# Patient Record
Sex: FEMALE | Race: BLACK OR AFRICAN AMERICAN | NOT HISPANIC OR LATINO | URBAN - METROPOLITAN AREA
[De-identification: names, ages, dates, MRNs, and addresses within clinical notes are randomized per-mention and may not be internally consistent; named-entity substitution may affect disease eponyms.]

---

## 2020-08-24 ENCOUNTER — OUT OF OFFICE VISIT (OUTPATIENT)
Dept: URBAN - METROPOLITAN AREA MEDICAL CENTER 12 | Facility: MEDICAL CENTER | Age: 25
End: 2020-08-24
Payer: COMMERCIAL

## 2020-08-24 DIAGNOSIS — R10.84 ABDOMINAL CRAMPING, GENERALIZED: ICD-10-CM

## 2020-08-24 DIAGNOSIS — K51.011 CHRONIC ULCERATIVE ENTEROCOLITIS WITH RECTAL BLEEDING: ICD-10-CM

## 2020-08-24 DIAGNOSIS — R11.0 CHRONIC NAUSEA: ICD-10-CM

## 2020-08-24 DIAGNOSIS — R19.7 ACUTE DIARRHEA: ICD-10-CM

## 2020-08-24 PROCEDURE — 99254 IP/OBS CNSLTJ NEW/EST MOD 60: CPT | Performed by: INTERNAL MEDICINE

## 2020-08-24 PROCEDURE — 99232 SBSQ HOSP IP/OBS MODERATE 35: CPT | Performed by: INTERNAL MEDICINE

## 2020-08-25 ENCOUNTER — OUT OF OFFICE VISIT (OUTPATIENT)
Dept: URBAN - METROPOLITAN AREA MEDICAL CENTER 12 | Facility: MEDICAL CENTER | Age: 25
End: 2020-08-25
Payer: COMMERCIAL

## 2020-08-25 DIAGNOSIS — R11.0 CHRONIC NAUSEA: ICD-10-CM

## 2020-08-25 DIAGNOSIS — K91.850 POUCHITIS: ICD-10-CM

## 2020-08-25 DIAGNOSIS — R19.7 ACUTE DIARRHEA: ICD-10-CM

## 2020-08-25 DIAGNOSIS — R10.13 ABDOMINAL DISCOMFORT, EPIGASTRIC: ICD-10-CM

## 2020-08-25 DIAGNOSIS — K62.5 ANAL BLEEDING: ICD-10-CM

## 2020-08-25 PROCEDURE — 43239 EGD BIOPSY SINGLE/MULTIPLE: CPT | Performed by: INTERNAL MEDICINE

## 2020-08-25 PROCEDURE — 44386 ENDOSCOPY BOWEL POUCH/BIOP: CPT | Performed by: INTERNAL MEDICINE

## 2020-09-18 ENCOUNTER — OFFICE VISIT (OUTPATIENT)
Dept: URBAN - METROPOLITAN AREA CLINIC 92 | Facility: CLINIC | Age: 25
End: 2020-09-18

## 2021-01-27 ENCOUNTER — TELEPHONE ENCOUNTER (OUTPATIENT)
Dept: URBAN - METROPOLITAN AREA CLINIC 92 | Facility: CLINIC | Age: 26
End: 2021-01-27

## 2021-01-27 ENCOUNTER — OFFICE VISIT (OUTPATIENT)
Dept: URBAN - METROPOLITAN AREA CLINIC 92 | Facility: CLINIC | Age: 26
End: 2021-01-27
Payer: COMMERCIAL

## 2021-01-27 ENCOUNTER — ERX REFILL RESPONSE (OUTPATIENT)
Dept: URBAN - METROPOLITAN AREA CLINIC 92 | Facility: CLINIC | Age: 26
End: 2021-01-27

## 2021-01-27 DIAGNOSIS — R19.7 DIARRHEA, UNSPECIFIED TYPE: ICD-10-CM

## 2021-01-27 DIAGNOSIS — R11.0 NAUSEA: ICD-10-CM

## 2021-01-27 DIAGNOSIS — R10.84 GENERALIZED ABDOMINAL PAIN: ICD-10-CM

## 2021-01-27 DIAGNOSIS — K51.00 ULCERATIVE PANCOLITIS WITHOUT COMPLICATION: ICD-10-CM

## 2021-01-27 PROCEDURE — 99214 OFFICE O/P EST MOD 30 MIN: CPT | Performed by: PHYSICIAN ASSISTANT

## 2021-01-27 PROCEDURE — G8427 DOCREV CUR MEDS BY ELIG CLIN: HCPCS | Performed by: PHYSICIAN ASSISTANT

## 2021-01-27 PROCEDURE — G8482 FLU IMMUNIZE ORDER/ADMIN: HCPCS | Performed by: PHYSICIAN ASSISTANT

## 2021-01-27 PROCEDURE — G8420 CALC BMI NORM PARAMETERS: HCPCS | Performed by: PHYSICIAN ASSISTANT

## 2021-01-27 PROCEDURE — G9903 PT SCRN TBCO ID AS NON USER: HCPCS | Performed by: PHYSICIAN ASSISTANT

## 2021-01-27 RX ORDER — LACTOBACIL 2/BIFIDO 1/S.THERMO 450B CELL
AS DIRECTED PACKET (EA) ORAL TWICE A DAY
Qty: 60 PACKET | Refills: 3 | OUTPATIENT
Start: 2021-01-27 | End: 2021-05-27

## 2021-01-27 RX ORDER — LACTOBACIL 2/BIFIDO 1/S.THERMO 900B CELL
AS DIRECTED PACKET (EA) ORAL
OUTPATIENT

## 2021-01-27 RX ORDER — LACTOBACIL 2/BIFIDO 1/S.THERMO 900B CELL
AS DIRECTED PACKET (EA) ORAL TWICE A DAY
Qty: 60 PACKET | Refills: 3 | OUTPATIENT
Start: 2021-01-27 | End: 2021-05-27

## 2021-01-27 RX ORDER — VEDOLIZUMAB 300 MG/5ML
INFUSE 300 MG OVER 30 MINUTE(S) BY INTRAVENOUS ROUTE EVERY 8 WEEKS INJECTION, POWDER, LYOPHILIZED, FOR SOLUTION INTRAVENOUS
Qty: 1 | Refills: 0 | Status: ON HOLD | COMMUNITY
Start: 1900-01-01

## 2021-01-27 RX ORDER — LACTOBACIL 2/BIFIDO 1/S.THERMO 900B CELL
AS DIRECTED PACKET (EA) ORAL TWICE A DAY
Qty: 60 | Refills: 3 | OUTPATIENT

## 2021-01-27 NOTE — PHYSICAL EXAM GASTROINTESTINAL
Abdomen , soft, nontender, nondistended , no guarding or rigidity , no masses palpable , hypoactive BS, well healed surgical scars with tenderness by ostomy site

## 2021-01-27 NOTE — HPI-OTHER HISTORIES
25yoF w/ hx of UC (pancolitis diagnosed 12/2018), failed to respond to MTX, 5ASA, Humira, Entyvio and  s/p total colectomy and ileoanal J pouch (12/2019) and ileostomy reversal (1/2020) with Dr. Payne who presented ED w increased abdominal pain and nausea x 1 week in Aug. C diff negative. LFTs WNL. Hg(H) 15.2  Hct (H) 46.4  MCV 92.1 Flex sig 8/2020: + pouch with congestion, edema, friability, edema + low rectal tone, neg TI; bx with chronic active inflammation of pouch and chronic active enteritis and ulceration of TI CT: Stable postsurgical appearance of total colectomy and ileoanal J-pouch anastomosis.  Multiple other ER visits and neg labs/imaging, received fluids and D.C home. Patient reports multiple courses of ABX with Dr. Payne with minimal short term improvement but still has chronic diarrhea, nausea and abd pain. Sx have improved over the last month but BM 6-15/day, + nocturnal stools. + pain at ostomy, tearing in nature, prior to BMs and cramps during BMs and during eating. Nausea is chronic but no vomiting and no GERD sx. No alleviating factors. No fevers or chills but has night sweats.   Prior colonoscopy in Sept 2020 revealed active enteritis on ileum biopsy EGD 8/25/2020 WNL  Labs with CPP this month-low glucose and low vit D

## 2021-01-28 LAB
C-REACTIVE PROTEIN, QUANT: 8
SEDIMENTATION RATE-WESTERGREN: 52

## 2021-01-29 ENCOUNTER — ERX REFILL RESPONSE (OUTPATIENT)
Dept: URBAN - METROPOLITAN AREA CLINIC 92 | Facility: CLINIC | Age: 26
End: 2021-01-29

## 2021-01-29 ENCOUNTER — TELEPHONE ENCOUNTER (OUTPATIENT)
Dept: URBAN - METROPOLITAN AREA CLINIC 23 | Facility: CLINIC | Age: 26
End: 2021-01-29

## 2021-01-29 RX ORDER — LACTOBACIL 2/BIFIDO 1/S.THERMO 900B CELL
AS DIRECTED PACKET (EA) ORAL TWICE A DAY
Qty: 60 | Refills: 3 | OUTPATIENT

## 2021-01-29 RX ORDER — LACTOBACIL 2/BIFIDO 1/S.THERMO 900B CELL
TWICE A DAY AS DIRECTED PACKET (EA) ORAL
Qty: 60 PACK | Refills: 3 | OUTPATIENT

## 2021-02-12 ENCOUNTER — TELEPHONE ENCOUNTER (OUTPATIENT)
Dept: URBAN - METROPOLITAN AREA CLINIC 92 | Facility: CLINIC | Age: 26
End: 2021-02-12

## 2021-02-17 ENCOUNTER — OFFICE VISIT (OUTPATIENT)
Dept: URBAN - METROPOLITAN AREA CLINIC 92 | Facility: CLINIC | Age: 26
End: 2021-02-17
Payer: COMMERCIAL

## 2021-02-17 VITALS
DIASTOLIC BLOOD PRESSURE: 78 MMHG | TEMPERATURE: 97.1 F | BODY MASS INDEX: 18.88 KG/M2 | HEIGHT: 61 IN | WEIGHT: 100 LBS | HEART RATE: 97 BPM | SYSTOLIC BLOOD PRESSURE: 110 MMHG

## 2021-02-17 DIAGNOSIS — R19.7 DIARRHEA, UNSPECIFIED TYPE: ICD-10-CM

## 2021-02-17 DIAGNOSIS — R11.0 NAUSEA: ICD-10-CM

## 2021-02-17 DIAGNOSIS — K51.00 ULCERATIVE PANCOLITIS WITHOUT COMPLICATION: ICD-10-CM

## 2021-02-17 DIAGNOSIS — R10.84 GENERALIZED ABDOMINAL PAIN: ICD-10-CM

## 2021-02-17 PROCEDURE — G9903 PT SCRN TBCO ID AS NON USER: HCPCS | Performed by: PHYSICIAN ASSISTANT

## 2021-02-17 PROCEDURE — G8420 CALC BMI NORM PARAMETERS: HCPCS | Performed by: PHYSICIAN ASSISTANT

## 2021-02-17 PROCEDURE — 99214 OFFICE O/P EST MOD 30 MIN: CPT | Performed by: PHYSICIAN ASSISTANT

## 2021-02-17 PROCEDURE — G8427 DOCREV CUR MEDS BY ELIG CLIN: HCPCS | Performed by: PHYSICIAN ASSISTANT

## 2021-02-17 PROCEDURE — G8482 FLU IMMUNIZE ORDER/ADMIN: HCPCS | Performed by: PHYSICIAN ASSISTANT

## 2021-02-17 RX ORDER — LACTOBACIL 2/BIFIDO 1/S.THERMO 900B CELL
AS DIRECTED PACKET (EA) ORAL
Status: ACTIVE | COMMUNITY

## 2021-02-17 RX ORDER — LACTOBACIL 2/BIFIDO 1/S.THERMO 900B CELL
AS DIRECTED PACKET (EA) ORAL TWICE A DAY
Qty: 60 PACKET | Refills: 3 | OUTPATIENT

## 2021-02-17 RX ORDER — LACTOBACIL 2/BIFIDO 1/S.THERMO 450B CELL
AS DIRECTED PACKET (EA) ORAL TWICE A DAY
Qty: 60 PACKET | Refills: 3 | Status: ACTIVE | COMMUNITY
Start: 2021-01-27 | End: 2021-05-27

## 2021-02-17 RX ORDER — VEDOLIZUMAB 300 MG/5ML
INFUSE 300 MG OVER 30 MINUTE(S) BY INTRAVENOUS ROUTE EVERY 8 WEEKS INJECTION, POWDER, LYOPHILIZED, FOR SOLUTION INTRAVENOUS
Qty: 1 | Refills: 0 | Status: ACTIVE | COMMUNITY
Start: 1900-01-01

## 2021-02-17 RX ORDER — LACTOBACIL 2/BIFIDO 1/S.THERMO 900B CELL
TWICE A DAY AS DIRECTED PACKET (EA) ORAL
Qty: 60 PACK | Refills: 3 | Status: ACTIVE | COMMUNITY

## 2021-02-17 NOTE — HPI-OTHER HISTORIES
25yoF w/ hx of UC (pancolitis diagnosed 12/2018), failed to respond to MTX, 5ASA, Humira, Entyvio and  s/p total colectomy and ileoanal J pouch (12/2019) and ileostomy reversal (1/2020) with Dr. Payne who presented ED w increased abdominal pain and nausea x 1 week in Aug.     C diff negative. LFTs WNL. Hg(H) 15.2  Hct (H) 46.4  MCV 92.1. Flex sig 8/2020: + pouch with congestion, edema, friability, edema + low rectal tone, neg TI; bx with chronic active inflammation of pouch and chronic active enteritis and ulceration of TI CT: Stable postsurgical appearance of total colectomy and ileoanal J-pouch anastomosis.  Multiple other ER visits and neg labs/imaging, received fluids and D.C home. Patient reports multiple courses of ABX with Dr. Payne (recalls wilmaro unsure of others, flagyl allergy) with minimal short term improvement but still has chronic diarrhea, nausea and abd pain. Pain improved some with morphine and helps bowels too but if not using this BM 6-15/day, + nocturnal stools. Generalized, constant abd pain + pain at ostomy, tearing in nature, worse prior to BMs and cramps during BMs and during eating. Nausea is chronic but no vomiting and no GERD sx. Small amounts of BRB intermittently.   Prior colonoscopy in Sept 2020 revealed active enteritis on ileum biopsy EGD 8/25/2020 WNL CTE 2/4/2021 s/p total colectomy with J pouch, normal small bowel--no hyperenhancement or thickening Did not start VSL yet

## 2021-02-22 ENCOUNTER — TELEPHONE ENCOUNTER (OUTPATIENT)
Dept: URBAN - METROPOLITAN AREA CLINIC 92 | Facility: CLINIC | Age: 26
End: 2021-02-22

## 2021-02-22 RX ORDER — MESALAMINE 1.2 G/1
4 TABLETS TABLET, DELAYED RELEASE ORAL ONCE A DAY
Qty: 120 TABLET | Refills: 1 | OUTPATIENT
Start: 2021-02-22 | End: 2021-04-23

## 2021-02-22 RX ORDER — MESALAMINE 4 G/60ML
AS DIRECTED SUSPENSION RECTAL NIGHTLY
Qty: 4 KITS | Refills: 1 | OUTPATIENT
Start: 2021-02-22 | End: 2021-04-19

## 2021-03-09 ENCOUNTER — TELEPHONE ENCOUNTER (OUTPATIENT)
Dept: URBAN - METROPOLITAN AREA CLINIC 92 | Facility: CLINIC | Age: 26
End: 2021-03-09

## 2021-03-12 ENCOUNTER — CLAIMS CREATED FROM THE CLAIM WINDOW (OUTPATIENT)
Dept: URBAN - METROPOLITAN AREA CLINIC 4 | Facility: CLINIC | Age: 26
End: 2021-03-12
Payer: COMMERCIAL

## 2021-03-12 ENCOUNTER — OFFICE VISIT (OUTPATIENT)
Dept: URBAN - METROPOLITAN AREA SURGERY CENTER 16 | Facility: SURGERY CENTER | Age: 26
End: 2021-03-12
Payer: COMMERCIAL

## 2021-03-12 ENCOUNTER — TELEPHONE ENCOUNTER (OUTPATIENT)
Dept: URBAN - METROPOLITAN AREA CLINIC 92 | Facility: CLINIC | Age: 26
End: 2021-03-12

## 2021-03-12 DIAGNOSIS — K50.00 CROHN'S DISEASE OF SMALL INTESTINE WITHOUT COMPLICATIONS: ICD-10-CM

## 2021-03-12 DIAGNOSIS — K91.850 POUCHITIS: ICD-10-CM

## 2021-03-12 DIAGNOSIS — K50.919 CROHN'S DISEASE, UNSPECIFIED, WITH UNSPECIFIED COMPLICATIONS: ICD-10-CM

## 2021-03-12 DIAGNOSIS — K50.80 CROHN'S COLITIS: ICD-10-CM

## 2021-03-12 DIAGNOSIS — K91.850 ILEAL POUCHITIS: ICD-10-CM

## 2021-03-12 PROCEDURE — 44386 ENDOSCOPY BOWEL POUCH/BIOP: CPT | Performed by: INTERNAL MEDICINE

## 2021-03-12 PROCEDURE — 88342 IMHCHEM/IMCYTCHM 1ST ANTB: CPT | Performed by: PATHOLOGY

## 2021-03-12 PROCEDURE — 88341 IMHCHEM/IMCYTCHM EA ADD ANTB: CPT | Performed by: PATHOLOGY

## 2021-03-12 PROCEDURE — 88305 TISSUE EXAM BY PATHOLOGIST: CPT | Performed by: PATHOLOGY

## 2021-03-12 PROCEDURE — G8907 PT DOC NO EVENTS ON DISCHARG: HCPCS | Performed by: INTERNAL MEDICINE

## 2021-03-12 RX ORDER — HYOSCYAMINE SULFATE 0.12 MG/1
1 TABLET UNDER THE TONGUE AND ALLOW TO DISSOLVE  AS NEEDED TABLET, ORALLY DISINTEGRATING ORAL THREE TIMES A DAY
Qty: 90 | Refills: 3 | OUTPATIENT
End: 2021-07-10

## 2021-03-12 RX ORDER — LACTOBACIL 2/BIFIDO 1/S.THERMO 900B CELL
AS DIRECTED PACKET (EA) ORAL TWICE A DAY
Qty: 60 PACKET | Refills: 3 | OUTPATIENT

## 2021-03-12 RX ORDER — CIPROFLOXACIN HYDROCHLORIDE 500 MG/1
1 TABLET TABLET, FILM COATED ORAL
Qty: 180 TABLET | Refills: 0 | OUTPATIENT
Start: 2021-03-12 | End: 2021-06-10

## 2021-03-12 NOTE — HPI-OTHER HISTORIES
26yoF w/ hx of UC (pancolitis diagnosed 12/2018), failed to respond to MTX, 5ASA, Humira, Entyvio and  s/p total colectomy and ileoanal J pouch (12/2019) and ileostomy reversal (1/2020) with Dr. Payne who presented ED w increased abdominal pain and nausea x 1 week in Aug.     C diff negative. LFTs WNL. Hg(H) 15.2  Hct (H) 46.4  MCV 92.1. Flex sig 8/2020: + pouch with congestion, edema, friability, edema + low rectal tone, neg TI; bx with chronic active inflammation of pouch and chronic active enteritis and ulceration of TI CT: Stable postsurgical appearance of total colectomy and ileoanal J-pouch anastomosis.  Multiple other ER visits and neg labs/imaging, received fluids and D.C home. Patient reports multiple courses of ABX with Dr. Payne (recalls cipro unsure of others, flagyl allergy) with minimal short term improvement but still has chronic diarrhea, nausea and abd pain. Pain improved some with morphine and helps bowels too but if not using this BM 6-15/day, + nocturnal stools. Generalized, constant abd pain + pain at ostomy, tearing in nature, worse prior to BMs and cramps during BMs and during eating. Nausea is chronic but no vomiting and no GERD sx. Small amounts of BRB intermittently.   Prior colonoscopy in Sept 2020 revealed active enteritis on ileum biopsy EGD 8/25/2020 WNL CTE 2/4/2021 s/p total colectomy with J pouch, normal small bowel--no hyperenhancement or thickening Colon 3/2021 moderate pouchitis, bx + chronic active ileitis with active ulcer c/w CD of IT and chronic active ileitis of pouch Given VSL, Cipro and topical/oral mesalamine but still with sx

## 2021-03-16 ENCOUNTER — ERX REFILL RESPONSE (OUTPATIENT)
Dept: URBAN - METROPOLITAN AREA CLINIC 92 | Facility: CLINIC | Age: 26
End: 2021-03-16

## 2021-03-16 RX ORDER — MESALAMINE 1.2 G/1
TAKE 4 TABLETS BY MOUTH EVERY DAY TABLET, DELAYED RELEASE ORAL
Qty: 120 TABLET | Refills: 1 | OUTPATIENT

## 2021-03-16 RX ORDER — MESALAMINE 1.2 G/1
4 TABLETS TABLET, DELAYED RELEASE ORAL ONCE A DAY
Qty: 120 | Refills: 1

## 2021-03-16 RX ORDER — MESALAMINE 1.2 G/1
4 TABLETS TABLET, DELAYED RELEASE ORAL ONCE A DAY
Qty: 120 | Refills: 1 | OUTPATIENT

## 2021-03-29 ENCOUNTER — ERX REFILL RESPONSE (OUTPATIENT)
Dept: URBAN - METROPOLITAN AREA CLINIC 92 | Facility: CLINIC | Age: 26
End: 2021-03-29

## 2021-03-29 RX ORDER — HYOSCYAMINE SULFATE 0.12 MG/1
1 TABLET UNDER THE TONGUE AND ALLOW TO DISSOLVE  AS NEEDED TABLET, ORALLY DISINTEGRATING ORAL THREE TIMES A DAY
Qty: 90 | Refills: 3 | OUTPATIENT

## 2021-03-29 RX ORDER — HYOSCYAMINE SULFATE 0.12 MG/1
1 TABLET UNDER THE TONGUE AND ALLOW TO DISSOLVE AS NEEDED THREE TIMES A DAY SUBLINGUAL 30 DAYS TABLET ORAL; SUBLINGUAL
Qty: 270 TABLET | Refills: 2 | OUTPATIENT

## 2021-04-02 ENCOUNTER — OFFICE VISIT (OUTPATIENT)
Dept: URBAN - METROPOLITAN AREA TELEHEALTH 2 | Facility: TELEHEALTH | Age: 26
End: 2021-04-02
Payer: COMMERCIAL

## 2021-04-02 VITALS — BODY MASS INDEX: 19.83 KG/M2 | WEIGHT: 105 LBS | HEIGHT: 61 IN

## 2021-04-02 DIAGNOSIS — R19.7 DIARRHEA, UNSPECIFIED TYPE: ICD-10-CM

## 2021-04-02 DIAGNOSIS — R79.89 ELEVATED LFTS: ICD-10-CM

## 2021-04-02 DIAGNOSIS — K51.00 ULCERATIVE PANCOLITIS WITHOUT COMPLICATION: ICD-10-CM

## 2021-04-02 DIAGNOSIS — K50.00 CROHN'S DISEASE OF SMALL INTESTINE WITHOUT COMPLICATION: ICD-10-CM

## 2021-04-02 PROBLEM — 56689002: Status: ACTIVE | Noted: 2021-04-02

## 2021-04-02 PROBLEM — 444548001: Status: ACTIVE | Noted: 2021-01-25

## 2021-04-02 PROCEDURE — 99215 OFFICE O/P EST HI 40 MIN: CPT | Performed by: INTERNAL MEDICINE

## 2021-04-02 RX ORDER — VEDOLIZUMAB 300 MG/5ML
INFUSE 300 MG OVER 30 MINUTE(S) BY INTRAVENOUS ROUTE EVERY 8 WEEKS INJECTION, POWDER, LYOPHILIZED, FOR SOLUTION INTRAVENOUS
Qty: 1 | Refills: 0 | Status: ACTIVE | COMMUNITY
Start: 1900-01-01

## 2021-04-02 RX ORDER — MESALAMINE 4 G/60ML
AS DIRECTED SUSPENSION RECTAL NIGHTLY
Qty: 4 KITS | Refills: 1 | Status: ACTIVE | COMMUNITY
Start: 2021-02-22 | End: 2021-04-19

## 2021-04-02 RX ORDER — CIPROFLOXACIN HYDROCHLORIDE 500 MG/1
1 TABLET TABLET, FILM COATED ORAL
Qty: 180 TABLET | Refills: 0 | Status: ACTIVE | COMMUNITY
Start: 2021-03-12 | End: 2021-06-10

## 2021-04-02 RX ORDER — LACTOBACIL 2/BIFIDO 1/S.THERMO 900B CELL
TWICE A DAY AS DIRECTED PACKET (EA) ORAL
Qty: 60 PACK | Refills: 3 | Status: ACTIVE | COMMUNITY

## 2021-04-02 RX ORDER — LACTOBACIL 2/BIFIDO 1/S.THERMO 900B CELL
AS DIRECTED PACKET (EA) ORAL
Status: ACTIVE | COMMUNITY

## 2021-04-02 RX ORDER — LACTOBACIL 2/BIFIDO 1/S.THERMO 900B CELL
AS DIRECTED PACKET (EA) ORAL TWICE A DAY
Qty: 60 PACKET | Refills: 3 | Status: ACTIVE | COMMUNITY

## 2021-04-02 RX ORDER — MESALAMINE 1.2 G/1
TAKE 4 TABLETS BY MOUTH EVERY DAY TABLET, DELAYED RELEASE ORAL
Qty: 120 TABLET | Refills: 1

## 2021-04-02 RX ORDER — LACTOBACIL 2/BIFIDO 1/S.THERMO 450B CELL
AS DIRECTED PACKET (EA) ORAL TWICE A DAY
Qty: 60 PACKET | Refills: 3 | Status: ACTIVE | COMMUNITY
Start: 2021-01-27 | End: 2021-05-27

## 2021-04-02 RX ORDER — HYOSCYAMINE SULFATE 0.12 MG/1
1 TABLET UNDER THE TONGUE AND ALLOW TO DISSOLVE AS NEEDED THREE TIMES A DAY SUBLINGUAL 30 DAYS TABLET ORAL; SUBLINGUAL
Qty: 270 TABLET | Refills: 2 | Status: ACTIVE | COMMUNITY

## 2021-04-02 RX ORDER — MESALAMINE 1.2 G/1
TAKE 4 TABLETS BY MOUTH EVERY DAY TABLET, DELAYED RELEASE ORAL
Qty: 120 TABLET | Refills: 1 | Status: ACTIVE | COMMUNITY

## 2021-04-02 RX ORDER — MESALAMINE 4 G/60ML
AS DIRECTED SUSPENSION RECTAL NIGHTLY
Qty: 4 KITS | Refills: 1
Start: 2021-02-22

## 2021-04-02 NOTE — HPI-OTHER HISTORIES
26yoF w/ hx of UC (pancolitis diagnosed 12/2018), failed to respond to MTX, 5ASA, Humira, Entyvio and  s/p total colectomy and ileoanal J pouch (12/2019) and ileostomy reversal (1/2020) with Dr. Payne who presented  ED w increased abdominal pain and nausea x 1 week in Aug.  C diff negative. Flex sig 8/2020: + pouch with  congestion, edema, friability, edema + low rectal tone, neg TI; bx with chronic active inflammation of pouch and chronic active enteritis and ulceration of TI CT: Stable postsurgical appearance of total colectomy and ileoanal J-pouch anastomosis.  Multiple other ER visits and neg labs/imaging, received fluids and D.C home. Patient reports multiple courses of ABX with Dr. Payne  with minimal short term improvement and then recurrent diarrhea, nausea and abd pain. Pain improved some with morphine and helps bowels too but if not using this BM 6-15/day, + nocturnal stools. Generalized, constant abd pain + pain at ostomy, tearing in nature, worse prior to BMs and cramps during BMs and during eating. Nausea is chronic but no vomiting and no GERD sx. Small amounts of BRB intermittently.   Prior colonoscopy in Sept 2020 revealed active enteritis on ileum biopsy EGD 8/25/2020 WNL CTE 2/4/2021 s/p total colectomy with J pouch, normal small bowel--no hyperenhancement or thickening Pouchoscopy 3/2021 moderate pouchitis and mild erythema of desirae-TI, bx of TI chronic active ileitis with apthous ulcer c/w crohns and pouch w chronic active ileitis She ic currently on cipro BID as well as mesalamine 4.8g/day and enema nightly and notes sign improvement in sx. Pain sign better and now mild. BM now 7/day, more formed and rare nocturnal stools. No further nausea or vomiting. Has gained 5#  LFTs normal 11/2020 and 2/2021:  AST 40 ALT 41 TB 0.7

## 2021-04-18 LAB
ALBUMIN: 4.6
ALKALINE PHOSPHATASE: 78
ALT (SGPT): 19
AST (SGOT): 22
BILIRUBIN, DIRECT: 0.07
BILIRUBIN, TOTAL: 0.2
HBSAG SCREEN: NEGATIVE
HEPATITIS B SURF AB QUANT: 6.1
PROTEIN, TOTAL: 8.2
QUANTIFERON CRITERIA: (no result)
QUANTIFERON INCUBATION: (no result)
QUANTIFERON MITOGEN VALUE: >10
QUANTIFERON NIL VALUE: 0
QUANTIFERON TB1 AG VALUE: 0
QUANTIFERON TB2 AG VALUE: 0
QUANTIFERON-TB GOLD PLUS: NEGATIVE
SEDIMENTATION RATE-WESTERGREN: 12
VITAMIN D, 25-HYDROXY: 16.8

## 2021-04-19 ENCOUNTER — TELEPHONE ENCOUNTER (OUTPATIENT)
Dept: URBAN - METROPOLITAN AREA CLINIC 92 | Facility: CLINIC | Age: 26
End: 2021-04-19

## 2021-04-19 RX ORDER — USTEKINUMAB 90 MG/ML
AS DIRECTED INJECTION, SOLUTION SUBCUTANEOUS
Qty: 1 PRE-FILLED PEN SYRINGE | Refills: 3 | OUTPATIENT
Start: 2021-04-19 | End: 2021-11-28

## 2021-04-19 RX ORDER — CHOLECALCIFEROL TAB 50 MCG (2000 UNIT) 50 MCG
ONCE PER WEEK TAB ORAL
Qty: 8 TABLETS | Refills: 0 | OUTPATIENT
Start: 2021-04-19 | End: 2021-06-14

## 2021-04-28 ENCOUNTER — OFFICE VISIT (OUTPATIENT)
Dept: URBAN - METROPOLITAN AREA CLINIC 91 | Facility: CLINIC | Age: 26
End: 2021-04-28
Payer: COMMERCIAL

## 2021-04-28 VITALS
BODY MASS INDEX: 20.32 KG/M2 | HEART RATE: 80 BPM | HEIGHT: 61 IN | RESPIRATION RATE: 16 BRPM | DIASTOLIC BLOOD PRESSURE: 76 MMHG | SYSTOLIC BLOOD PRESSURE: 104 MMHG | WEIGHT: 107.6 LBS | TEMPERATURE: 95 F

## 2021-04-28 DIAGNOSIS — K51.80 CHRONIC PANCOLONIC ULCERATIVE COLITIS: ICD-10-CM

## 2021-04-28 PROCEDURE — 96365 THER/PROPH/DIAG IV INF INIT: CPT | Performed by: INTERNAL MEDICINE

## 2021-04-28 RX ORDER — CHOLECALCIFEROL TAB 50 MCG (2000 UNIT) 50 MCG
ONCE PER WEEK TAB ORAL
Qty: 8 TABLETS | Refills: 0 | Status: ACTIVE | COMMUNITY
Start: 2021-04-19 | End: 2021-06-14

## 2021-04-28 RX ORDER — VEDOLIZUMAB 300 MG/5ML
INFUSE 300 MG OVER 30 MINUTE(S) BY INTRAVENOUS ROUTE EVERY 8 WEEKS INJECTION, POWDER, LYOPHILIZED, FOR SOLUTION INTRAVENOUS
Qty: 1 | Refills: 0 | Status: ACTIVE | COMMUNITY
Start: 1900-01-01

## 2021-04-28 RX ORDER — CIPROFLOXACIN HYDROCHLORIDE 500 MG/1
1 TABLET TABLET, FILM COATED ORAL
Qty: 180 TABLET | Refills: 0 | Status: ACTIVE | COMMUNITY
Start: 2021-03-12 | End: 2021-06-10

## 2021-04-28 RX ORDER — LACTOBACIL 2/BIFIDO 1/S.THERMO 900B CELL
TWICE A DAY AS DIRECTED PACKET (EA) ORAL
Qty: 60 PACK | Refills: 3 | Status: ACTIVE | COMMUNITY

## 2021-04-28 RX ORDER — LACTOBACIL 2/BIFIDO 1/S.THERMO 900B CELL
AS DIRECTED PACKET (EA) ORAL TWICE A DAY
Qty: 60 PACKET | Refills: 3 | Status: ACTIVE | COMMUNITY

## 2021-04-28 RX ORDER — MESALAMINE 4 G/60ML
AS DIRECTED SUSPENSION RECTAL NIGHTLY
Qty: 4 KITS | Refills: 1 | Status: ACTIVE | COMMUNITY
Start: 2021-02-22

## 2021-04-28 RX ORDER — HYOSCYAMINE SULFATE 0.12 MG/1
1 TABLET UNDER THE TONGUE AND ALLOW TO DISSOLVE AS NEEDED THREE TIMES A DAY SUBLINGUAL 30 DAYS TABLET ORAL; SUBLINGUAL
Qty: 270 TABLET | Refills: 2 | Status: ACTIVE | COMMUNITY

## 2021-04-28 RX ORDER — LACTOBACIL 2/BIFIDO 1/S.THERMO 900B CELL
AS DIRECTED PACKET (EA) ORAL
Status: ACTIVE | COMMUNITY

## 2021-04-28 RX ORDER — USTEKINUMAB 90 MG/ML
AS DIRECTED INJECTION, SOLUTION SUBCUTANEOUS
Qty: 1 PRE-FILLED PEN SYRINGE | Refills: 3 | Status: ACTIVE | COMMUNITY
Start: 2021-04-19 | End: 2021-11-28

## 2021-04-28 RX ORDER — LACTOBACIL 2/BIFIDO 1/S.THERMO 450B CELL
AS DIRECTED PACKET (EA) ORAL TWICE A DAY
Qty: 60 PACKET | Refills: 3 | Status: ACTIVE | COMMUNITY
Start: 2021-01-27 | End: 2021-05-27

## 2021-04-28 RX ORDER — MESALAMINE 1.2 G/1
TAKE 4 TABLETS BY MOUTH EVERY DAY TABLET, DELAYED RELEASE ORAL
Qty: 120 TABLET | Refills: 1 | Status: ACTIVE | COMMUNITY

## 2021-04-30 ENCOUNTER — TELEPHONE ENCOUNTER (OUTPATIENT)
Dept: URBAN - METROPOLITAN AREA CLINIC 92 | Facility: CLINIC | Age: 26
End: 2021-04-30

## 2021-04-30 RX ORDER — USTEKINUMAB 90 MG/ML
AS DIRECTED INJECTION, SOLUTION SUBCUTANEOUS
Qty: 1 PRE-FILLED PEN SYRINGE | Refills: 3
Start: 2021-04-19

## 2021-05-07 ENCOUNTER — TELEPHONE ENCOUNTER (OUTPATIENT)
Dept: URBAN - METROPOLITAN AREA CLINIC 92 | Facility: CLINIC | Age: 26
End: 2021-05-07

## 2021-05-21 ENCOUNTER — OFFICE VISIT (OUTPATIENT)
Dept: URBAN - METROPOLITAN AREA TELEHEALTH 2 | Facility: TELEHEALTH | Age: 26
End: 2021-05-21
Payer: COMMERCIAL

## 2021-05-21 DIAGNOSIS — K50.80 CROHN'S DISEASE OF BOTH SMALL AND LARGE INTESTINE WITHOUT COMPLICATION: ICD-10-CM

## 2021-05-21 PROCEDURE — 99214 OFFICE O/P EST MOD 30 MIN: CPT | Performed by: INTERNAL MEDICINE

## 2021-05-21 RX ORDER — LACTOBACIL 2/BIFIDO 1/S.THERMO 900B CELL
AS DIRECTED PACKET (EA) ORAL TWICE A DAY
Qty: 60 PACKET | Refills: 3 | Status: ACTIVE | COMMUNITY

## 2021-05-21 RX ORDER — VEDOLIZUMAB 300 MG/5ML
INFUSE 300 MG OVER 30 MINUTE(S) BY INTRAVENOUS ROUTE EVERY 8 WEEKS INJECTION, POWDER, LYOPHILIZED, FOR SOLUTION INTRAVENOUS
Qty: 1 | Refills: 0 | Status: ACTIVE | COMMUNITY
Start: 1900-01-01

## 2021-05-21 RX ORDER — LACTOBACIL 2/BIFIDO 1/S.THERMO 900B CELL
TWICE A DAY AS DIRECTED PACKET (EA) ORAL
Qty: 60 PACK | Refills: 3 | Status: ACTIVE | COMMUNITY

## 2021-05-21 RX ORDER — USTEKINUMAB 90 MG/ML
AS DIRECTED INJECTION, SOLUTION SUBCUTANEOUS
Qty: 1 PRE-FILLED PEN SYRINGE | Refills: 3 | Status: ACTIVE | COMMUNITY
Start: 2021-04-19

## 2021-05-21 RX ORDER — LACTOBACIL 2/BIFIDO 1/S.THERMO 900B CELL
AS DIRECTED PACKET (EA) ORAL
Status: ACTIVE | COMMUNITY

## 2021-05-21 RX ORDER — CIPROFLOXACIN HYDROCHLORIDE 500 MG/1
1 TABLET TABLET, FILM COATED ORAL
Qty: 180 TABLET | Refills: 0 | Status: ACTIVE | COMMUNITY
Start: 2021-03-12 | End: 2021-06-10

## 2021-05-21 RX ORDER — MESALAMINE 1.2 G/1
TAKE 4 TABLETS BY MOUTH EVERY DAY TABLET, DELAYED RELEASE ORAL
Qty: 120 TABLET | Refills: 1 | Status: ACTIVE | COMMUNITY

## 2021-05-21 RX ORDER — HYOSCYAMINE SULFATE 0.12 MG/1
1 TABLET UNDER THE TONGUE AND ALLOW TO DISSOLVE AS NEEDED THREE TIMES A DAY SUBLINGUAL 30 DAYS TABLET ORAL; SUBLINGUAL
Qty: 270 TABLET | Refills: 2 | Status: ACTIVE | COMMUNITY

## 2021-05-21 RX ORDER — LACTOBACIL 2/BIFIDO 1/S.THERMO 450B CELL
AS DIRECTED PACKET (EA) ORAL TWICE A DAY
Qty: 60 PACKET | Refills: 3 | Status: ACTIVE | COMMUNITY
Start: 2021-01-27 | End: 2021-05-27

## 2021-05-21 RX ORDER — CHOLECALCIFEROL TAB 50 MCG (2000 UNIT) 50 MCG
ONCE PER WEEK TAB ORAL
Qty: 8 TABLETS | Refills: 0 | Status: ACTIVE | COMMUNITY
Start: 2021-04-19 | End: 2021-06-14

## 2021-05-21 RX ORDER — MESALAMINE 4 G/60ML
AS DIRECTED SUSPENSION RECTAL NIGHTLY
Qty: 4 KITS | Refills: 1 | Status: ACTIVE | COMMUNITY
Start: 2021-02-22

## 2021-05-21 NOTE — HPI-OTHER HISTORIES
26yoF w/ hx of UC (pancolitis diagnosed 12/2018), failed to respond to MTX, 5ASA, Humira, Entyvio and  s/p  total colectomy and ileoanal J pouch (12/2019) and ileostomy reversal (1/2020) with Dr. Payne who presented  ED w increased abdominal pain and nausea x 1 week in Aug.  C diff negative. Flex sig 8/2020: + pouch with        congestion, edema, friability, edema + low rectal tone, neg TI; bx with chronic active inflammation of pouch and chronic active enteritis and ulceration of TI CT: Stable postsurgical appearance of total colectomy and ileoanal J-pouch anastomosis.  Multiple other ER visits and neg labs/imaging, received fluids and D.C home. Patient reports multiple courses of ABX with Dr. Payne  with minimal short term improvement and then recurrent diarrhea, nausea and abd pain. Pain improved some with morphine and helps bowels too but if not using this BM 6-15/day, + nocturnal stools. Generalized, constant abd pain + pain at ostomy, tearing in nature, worse prior to BMs and cramps during BMs and during eating. Nausea is chronic but no vomiting and no GERD sx. Small amounts of BRB intermittently.   Prior colonoscopy in Sept 2020 revealed active enteritis on ileum biopsy EGD 8/25/2020 WNL CTE 2/4/2021 s/p total colectomy with J pouch, normal small bowel--no hyperenhancement or thickening Pouchoscopy 3/2021 moderate pouchitis and mild erythema of desirae-TI, bx of TI chronic active ileitis with apthous ulcer c/w crohns and pouch w chronic active ileitis  She was on cipro BID as well as mesalamine 4.8g/day and rowasa and noted sign improvement in sx w/ BM 7/day, more formed and rare nocturnal stools. No further nausea or vomiting. Had gained 5#.  Stelara infusion 4/28/2021 and on her own stopped all meds 2 weeks ago and now with increased sx with BMs 12/day including nocturnal stools and tenesmus as well as abd pain. No fevers or chills.  On weekly Vit D  SQ Stelara denied, waiting appeal

## 2021-09-13 ENCOUNTER — OFFICE VISIT (OUTPATIENT)
Dept: URBAN - METROPOLITAN AREA CLINIC 92 | Facility: CLINIC | Age: 26
End: 2021-09-13
Payer: COMMERCIAL

## 2021-09-13 ENCOUNTER — TELEPHONE ENCOUNTER (OUTPATIENT)
Dept: URBAN - METROPOLITAN AREA CLINIC 92 | Facility: CLINIC | Age: 26
End: 2021-09-13

## 2021-09-13 DIAGNOSIS — R10.84 GENERALIZED ABDOMINAL PAIN: ICD-10-CM

## 2021-09-13 DIAGNOSIS — K50.80 CROHN'S DISEASE OF BOTH SMALL AND LARGE INTESTINE WITHOUT COMPLICATION: ICD-10-CM

## 2021-09-13 PROCEDURE — 99214 OFFICE O/P EST MOD 30 MIN: CPT | Performed by: INTERNAL MEDICINE

## 2021-09-13 RX ORDER — HYOSCYAMINE SULFATE 0.12 MG/1
1 TABLET UNDER THE TONGUE AND ALLOW TO DISSOLVE AS NEEDED THREE TIMES A DAY SUBLINGUAL 30 DAYS TABLET ORAL; SUBLINGUAL
Qty: 270 TABLET | Refills: 2 | Status: DISCONTINUED | COMMUNITY

## 2021-09-13 RX ORDER — LACTOBACIL 2/BIFIDO 1/S.THERMO 900B CELL
AS DIRECTED PACKET (EA) ORAL
Status: ON HOLD | COMMUNITY

## 2021-09-13 RX ORDER — USTEKINUMAB 90 MG/ML
AS DIRECTED INJECTION, SOLUTION SUBCUTANEOUS
Qty: 1 SYRINGE | Refills: 3 | OUTPATIENT
Start: 2021-09-13 | End: 2022-04-25

## 2021-09-13 RX ORDER — LACTOBACIL 2/BIFIDO 1/S.THERMO 900B CELL
TWICE A DAY AS DIRECTED PACKET (EA) ORAL
Qty: 60 PACK | Refills: 3 | Status: ON HOLD | COMMUNITY

## 2021-09-13 RX ORDER — USTEKINUMAB 90 MG/ML
AS DIRECTED INJECTION, SOLUTION SUBCUTANEOUS
Qty: 1 PRE-FILLED PEN SYRINGE | Refills: 3 | Status: ON HOLD | COMMUNITY
Start: 2021-04-19

## 2021-09-13 RX ORDER — VEDOLIZUMAB 300 MG/5ML
INFUSE 300 MG OVER 30 MINUTE(S) BY INTRAVENOUS ROUTE EVERY 8 WEEKS INJECTION, POWDER, LYOPHILIZED, FOR SOLUTION INTRAVENOUS
Qty: 1 | Refills: 0 | Status: DISCONTINUED | COMMUNITY
Start: 1900-01-01

## 2021-09-13 RX ORDER — MESALAMINE 4 G/60ML
AS DIRECTED SUSPENSION RECTAL NIGHTLY
Qty: 4 KITS | Refills: 1 | Status: ON HOLD | COMMUNITY
Start: 2021-02-22

## 2021-09-13 RX ORDER — MESALAMINE 1.2 G/1
TAKE 4 TABLETS BY MOUTH EVERY DAY TABLET, DELAYED RELEASE ORAL
Qty: 120 TABLET | Refills: 1 | Status: ON HOLD | COMMUNITY

## 2021-09-13 NOTE — PHYSICAL EXAM CONSTITUTIONAL:
pleasant, well nourished, well developed, in no acute distress but wrapped in blanket and hunched over , normal communication ability

## 2021-09-13 NOTE — HPI-OTHER HISTORIES
26yoF w/ hx of UC (pancolitis diagnosed 12/2018), failed to respond to MTX, 5ASA, Humira, Entyvio and  s/p   total colectomy and ileoanal J pouch (12/2019) and ileostomy reversal (1/2020) who now presents for eval of  abd pain. The pain is mostly by her old ileo-stomy and increases with BMs. Was having BM 10/day but now only with small volume stools X 2 days. +Intermittent hematochezia. She also notes cold/cough sx as well as night sweats. Had covid vaccines but has not been tested.   Went to ER 9/9 at [facility] and CT 9/9: Expected postsurgical changes related to colectomy and ileoanal anastomosis with perianastomotic dilation of the bowel. There is no upstream dilation of the proximal bowel to suggest obstruction. Findings are grossly stable. Wall thickening of the distal small bowel is unchanged, and likely represents chronic inflammation/postsurgical changes. However, no evidence of new fat stranding to suggest an acute process.  Reactive mesenteric lymph nodes, unchanged. Stable hepatomegaly.  Flex sig 8/2020: + pouch with congestion, edema, friability, edema + low rectal tone, neg TI; bx with chronic active inflammation of pouch and chronic active enteritis and ulceration of TI   She has had chronic pain for years and multiple ER visits and neg labs/imaging over the last year. She was on multiple courses of ABX with Dr. Payne with minimal short term improvement and then recurrent diarrhea, nausea and abd pain. Pain improved some with morphine.   Pouchoscopy 3/2021 moderate pouchitis and mild erythema of desirae-TI, bx of TI chronic active ileitis with apthous ulcer c/w crohns and pouch w chronic active ileitis   She was started on stelara injection in April but subQ denied and lost to f/u until now. She was also on mesalamine po and topical and stopped on her own  Prior colonoscopy in Sept 2020 revealed active enteritis on ileum biopsy EGD 8/25/2020 WNL CTE 2/4/2021 s/p total colectomy with J pouch, normal small bowel--no hyperenhancement or thickening Labs reviewed from [place]: WBC is at 8.3, hemoglobin is at 15.6, hematocrit is at 47.9, platelets at 528.  Sodium is at 136, potassium at 4.2, BUN is 9, creatinine is 0.89, glucose at 91.  LFTs within normal limits.  Lipase elevated at 108.  Sed rate is at 83.  CRP is at 11.7.

## 2021-09-14 ENCOUNTER — TELEPHONE ENCOUNTER (OUTPATIENT)
Dept: URBAN - METROPOLITAN AREA CLINIC 92 | Facility: CLINIC | Age: 26
End: 2021-09-14

## 2021-09-15 ENCOUNTER — OUT OF OFFICE VISIT (OUTPATIENT)
Dept: URBAN - METROPOLITAN AREA MEDICAL CENTER 39 | Facility: MEDICAL CENTER | Age: 26
End: 2021-09-15
Payer: COMMERCIAL

## 2021-09-15 DIAGNOSIS — E87.1 ACUTE HYPONATREMIA: ICD-10-CM

## 2021-09-15 DIAGNOSIS — K50.818 CROHN'S DISEASE OF BOTH SMALL AND LARGE INTESTINE WITH OTHER COMPLICATION: ICD-10-CM

## 2021-09-15 DIAGNOSIS — K50.812 CROHN'S DISEASE OF BOTH SMALL AND LARGE INTESTINE WITH INTESTINAL OBSTRUCTION: ICD-10-CM

## 2021-09-15 DIAGNOSIS — R19.7 ACUTE DIARRHEA: ICD-10-CM

## 2021-09-15 PROCEDURE — 99254 IP/OBS CNSLTJ NEW/EST MOD 60: CPT | Performed by: INTERNAL MEDICINE

## 2021-09-15 PROCEDURE — 99232 SBSQ HOSP IP/OBS MODERATE 35: CPT | Performed by: INTERNAL MEDICINE

## 2021-09-18 ENCOUNTER — OUT OF OFFICE VISIT (OUTPATIENT)
Dept: URBAN - METROPOLITAN AREA MEDICAL CENTER 39 | Facility: MEDICAL CENTER | Age: 26
End: 2021-09-18
Payer: COMMERCIAL

## 2021-09-18 DIAGNOSIS — K92.1 ACUTE MELENA: ICD-10-CM

## 2021-09-18 DIAGNOSIS — K50.80 CROHN'S COLITIS: ICD-10-CM

## 2021-09-18 PROCEDURE — 99233 SBSQ HOSP IP/OBS HIGH 50: CPT | Performed by: INTERNAL MEDICINE

## 2021-10-04 ENCOUNTER — OFFICE VISIT (OUTPATIENT)
Dept: URBAN - METROPOLITAN AREA CLINIC 124 | Facility: CLINIC | Age: 26
End: 2021-10-04
Payer: COMMERCIAL

## 2021-10-04 VITALS
HEIGHT: 61 IN | DIASTOLIC BLOOD PRESSURE: 70 MMHG | HEART RATE: 101 BPM | WEIGHT: 101.2 LBS | BODY MASS INDEX: 19.11 KG/M2 | TEMPERATURE: 96.2 F | SYSTOLIC BLOOD PRESSURE: 99 MMHG

## 2021-10-04 DIAGNOSIS — R10.84 GENERALIZED ABDOMINAL PAIN: ICD-10-CM

## 2021-10-04 DIAGNOSIS — K50.80 CROHN'S DISEASE OF BOTH SMALL AND LARGE INTESTINE WITHOUT COMPLICATION: ICD-10-CM

## 2021-10-04 PROCEDURE — 99214 OFFICE O/P EST MOD 30 MIN: CPT | Performed by: PHYSICIAN ASSISTANT

## 2021-10-04 RX ORDER — USTEKINUMAB 90 MG/ML
AS DIRECTED INJECTION, SOLUTION SUBCUTANEOUS
Qty: 1 SYRINGE | Refills: 3 | Status: ACTIVE | COMMUNITY
Start: 2021-09-13 | End: 2022-04-25

## 2021-10-04 RX ORDER — MESALAMINE 1.2 G/1
TAKE 4 TABLETS BY MOUTH EVERY DAY TABLET, DELAYED RELEASE ORAL
Qty: 120 TABLET | Refills: 1 | Status: ON HOLD | COMMUNITY

## 2021-10-04 RX ORDER — LACTOBACIL 2/BIFIDO 1/S.THERMO 900B CELL
TWICE A DAY AS DIRECTED PACKET (EA) ORAL
Qty: 60 PACK | Refills: 3 | Status: ON HOLD | COMMUNITY

## 2021-10-04 RX ORDER — MESALAMINE 4 G/60ML
AS DIRECTED SUSPENSION RECTAL NIGHTLY
Qty: 4 KITS | Refills: 1 | Status: ON HOLD | COMMUNITY
Start: 2021-02-22

## 2021-10-04 RX ORDER — LACTOBACIL 2/BIFIDO 1/S.THERMO 900B CELL
AS DIRECTED PACKET (EA) ORAL
Status: ON HOLD | COMMUNITY

## 2021-10-04 RX ORDER — USTEKINUMAB 90 MG/ML
AS DIRECTED INJECTION, SOLUTION SUBCUTANEOUS
Qty: 1 PRE-FILLED PEN SYRINGE | Refills: 3 | Status: ON HOLD | COMMUNITY
Start: 2021-04-19

## 2021-10-04 RX ORDER — USTEKINUMAB 90 MG/ML
AS DIRECTED INJECTION, SOLUTION SUBCUTANEOUS
Qty: 1 SYRINGE | Refills: 3 | OUTPATIENT

## 2021-10-04 NOTE — HPI-OTHER HISTORIES
26yoF w/ hx of UC (pancolitis diagnosed 12/2018), failed to respond to MTX, 5ASA, Humira, Entyvio and  s/p    total colectomy and ileoanal J pouch (12/2019) and ileostomy reversal (1/2020) who now presents for hospital f/u. Admitted to [facility] 9/14-9/20/202.   CT showed expected postsurgical changes related to colectomy and ileoanal anastomosis with demetrius-anastomotic dilation of the bowel. Findings are grossly stable, wall thickening of the distal small bowel is changed, and likely related to chronic inflammation/postsurgical changes. However, no evidence of new fat stranding to suggest an acute process, reactive mesenteric lymph nodes, unchanged. Stable hepatomegaly SED 83 CRP 21.7  LFTs WNL Hg (L) 10.7  Hct (L) 31.4  MCV 93.5  C-diff negative, WBC +, and stool culture w/ no shigella, Campylobacter, or e. coli Given 40mg of prednisone initially and now on 30mg.  Currently abd pain improved and is "bearable" but still has pain around her old ostomy site but also generalized at times. BM 7-8/day, + nocturnal stools. Minimal hematochezia.   Flex sig 8/2020: + pouch with congestion, edema, friability, edema + low rectal tone, neg TI; bx with chronic active inflammation of pouch and chronic active enteritis and ulceration of TI   Pouchoscopy 3/2021 moderate pouchitis and mild erythema of desirae-TI, bx of TI chronic active ileitis with apthous ulcer c/w crohns and pouch w chronic active ileitis   She has had chronic pain for years and multiple ER visits and neg labs/imaging over the last year. She was on multiple courses of ABX with Dr. Payne with minimal short term improvement and then recurrent diarrhea, nausea and abd pain. Pain improved some with morphine.   Colonoscopy in Sept 2020 revealed active enteritis on ileum biopsy EGD 8/25/2020 WNL CTE 2/4/2021 s/p total colectomy with J pouch, normal small bowel--no hyperenhancement or thickening  Got Hep B booster at work since below labs

## 2021-10-04 NOTE — PHYSICAL EXAM GASTROINTESTINAL
Abdomen , soft, generalized tenderness, nondistended , no guarding or rigidity , no masses palpable , normal bowel sounds , no hepatomegaly present  none

## 2021-10-05 ENCOUNTER — OFFICE VISIT (OUTPATIENT)
Dept: URBAN - METROPOLITAN AREA CLINIC 91 | Facility: CLINIC | Age: 26
End: 2021-10-05
Payer: COMMERCIAL

## 2021-10-05 VITALS
RESPIRATION RATE: 16 BRPM | SYSTOLIC BLOOD PRESSURE: 98 MMHG | WEIGHT: 100 LBS | DIASTOLIC BLOOD PRESSURE: 81 MMHG | TEMPERATURE: 98.6 F | HEIGHT: 61 IN | BODY MASS INDEX: 18.88 KG/M2 | HEART RATE: 87 BPM

## 2021-10-05 DIAGNOSIS — K51.80 CHRONIC PANCOLONIC ULCERATIVE COLITIS: ICD-10-CM

## 2021-10-05 PROCEDURE — 96413 CHEMO IV INFUSION 1 HR: CPT | Performed by: PHYSICIAN ASSISTANT

## 2021-10-05 RX ORDER — LACTOBACIL 2/BIFIDO 1/S.THERMO 900B CELL
TWICE A DAY AS DIRECTED PACKET (EA) ORAL
Qty: 60 PACK | Refills: 3 | Status: ON HOLD | COMMUNITY

## 2021-10-05 RX ORDER — USTEKINUMAB 90 MG/ML
AS DIRECTED INJECTION, SOLUTION SUBCUTANEOUS
Qty: 1 SYRINGE | Refills: 3 | Status: ACTIVE | COMMUNITY

## 2021-10-05 RX ORDER — MESALAMINE 1.2 G/1
TAKE 4 TABLETS BY MOUTH EVERY DAY TABLET, DELAYED RELEASE ORAL
Qty: 120 TABLET | Refills: 1 | Status: ON HOLD | COMMUNITY

## 2021-10-05 RX ORDER — LACTOBACIL 2/BIFIDO 1/S.THERMO 900B CELL
AS DIRECTED PACKET (EA) ORAL
Status: ON HOLD | COMMUNITY

## 2021-10-05 RX ORDER — MESALAMINE 4 G/60ML
AS DIRECTED SUSPENSION RECTAL NIGHTLY
Qty: 4 KITS | Refills: 1 | Status: ON HOLD | COMMUNITY
Start: 2021-02-22

## 2021-10-05 RX ORDER — USTEKINUMAB 90 MG/ML
AS DIRECTED INJECTION, SOLUTION SUBCUTANEOUS
Qty: 1 PRE-FILLED PEN SYRINGE | Refills: 3 | Status: ON HOLD | COMMUNITY
Start: 2021-04-19

## 2021-10-13 ENCOUNTER — OUT OF OFFICE VISIT (OUTPATIENT)
Dept: URBAN - METROPOLITAN AREA MEDICAL CENTER 39 | Facility: MEDICAL CENTER | Age: 26
End: 2021-10-13
Payer: COMMERCIAL

## 2021-10-13 DIAGNOSIS — R10.31 ABDOMINAL CRAMPING IN RIGHT LOWER QUADRANT: ICD-10-CM

## 2021-10-13 DIAGNOSIS — R19.7 ACUTE DIARRHEA: ICD-10-CM

## 2021-10-13 DIAGNOSIS — K62.5 ANAL BLEEDING: ICD-10-CM

## 2021-10-13 PROCEDURE — 99222 1ST HOSP IP/OBS MODERATE 55: CPT | Performed by: INTERNAL MEDICINE

## 2021-10-13 PROCEDURE — G8427 DOCREV CUR MEDS BY ELIG CLIN: HCPCS | Performed by: INTERNAL MEDICINE

## 2021-10-13 PROCEDURE — 99232 SBSQ HOSP IP/OBS MODERATE 35: CPT | Performed by: INTERNAL MEDICINE

## 2021-11-11 ENCOUNTER — OUT OF OFFICE VISIT (OUTPATIENT)
Dept: URBAN - METROPOLITAN AREA MEDICAL CENTER 39 | Facility: MEDICAL CENTER | Age: 26
End: 2021-11-11
Payer: COMMERCIAL

## 2021-11-11 DIAGNOSIS — D72.829 ELEVATED WBC COUNT: ICD-10-CM

## 2021-11-11 DIAGNOSIS — K50.811 CROHN'S DISEASE OF BOTH SMALL AND LARGE INTESTINE WITH RECTAL BLEEDING: ICD-10-CM

## 2021-11-11 PROCEDURE — 99233 SBSQ HOSP IP/OBS HIGH 50: CPT | Performed by: INTERNAL MEDICINE

## 2021-11-11 PROCEDURE — 99223 1ST HOSP IP/OBS HIGH 75: CPT | Performed by: INTERNAL MEDICINE

## 2021-11-11 PROCEDURE — G8427 DOCREV CUR MEDS BY ELIG CLIN: HCPCS | Performed by: INTERNAL MEDICINE

## 2021-11-12 ENCOUNTER — TELEPHONE ENCOUNTER (OUTPATIENT)
Dept: URBAN - METROPOLITAN AREA CLINIC 92 | Facility: CLINIC | Age: 26
End: 2021-11-12

## 2021-11-18 ENCOUNTER — OFFICE VISIT (OUTPATIENT)
Dept: URBAN - METROPOLITAN AREA CLINIC 92 | Facility: CLINIC | Age: 26
End: 2021-11-18
Payer: COMMERCIAL

## 2021-11-18 DIAGNOSIS — K50.80 CROHN'S DISEASE OF BOTH SMALL AND LARGE INTESTINE WITHOUT COMPLICATION: ICD-10-CM

## 2021-11-18 DIAGNOSIS — E55.9 VITAMIN D DEFICIENCY: ICD-10-CM

## 2021-11-18 DIAGNOSIS — R10.84 GENERALIZED ABDOMINAL PAIN: ICD-10-CM

## 2021-11-18 PROCEDURE — 99214 OFFICE O/P EST MOD 30 MIN: CPT | Performed by: PHYSICIAN ASSISTANT

## 2021-11-18 RX ORDER — LACTOBACIL 2/BIFIDO 1/S.THERMO 900B CELL
AS DIRECTED PACKET (EA) ORAL
Status: ON HOLD | COMMUNITY

## 2021-11-18 RX ORDER — USTEKINUMAB 90 MG/ML
AS DIRECTED INJECTION, SOLUTION SUBCUTANEOUS
Qty: 1 SYRINGE | Refills: 3 | Status: ACTIVE | COMMUNITY

## 2021-11-18 RX ORDER — MESALAMINE 4 G/60ML
AS DIRECTED SUSPENSION RECTAL NIGHTLY
Qty: 4 KITS | Refills: 1 | Status: ON HOLD | COMMUNITY
Start: 2021-02-22

## 2021-11-18 RX ORDER — USTEKINUMAB 90 MG/ML
AS DIRECTED INJECTION, SOLUTION SUBCUTANEOUS
Qty: 1 SYRINGE | Refills: 3 | OUTPATIENT

## 2021-11-18 RX ORDER — LACTOBACIL 2/BIFIDO 1/S.THERMO 900B CELL
TWICE A DAY AS DIRECTED PACKET (EA) ORAL
Qty: 60 PACK | Refills: 3 | Status: ON HOLD | COMMUNITY

## 2021-11-18 RX ORDER — MESALAMINE 1.2 G/1
TAKE 4 TABLETS BY MOUTH EVERY DAY TABLET, DELAYED RELEASE ORAL
Qty: 120 TABLET | Refills: 1 | Status: ON HOLD | COMMUNITY

## 2021-11-18 RX ORDER — USTEKINUMAB 90 MG/ML
AS DIRECTED INJECTION, SOLUTION SUBCUTANEOUS
Qty: 1 PRE-FILLED PEN SYRINGE | Refills: 3 | Status: ON HOLD | COMMUNITY
Start: 2021-04-19

## 2021-11-18 NOTE — HPI-OTHER HISTORIES
26yoF w/ hx of UC (pancolitis diagnosed 12/2018), failed to respond to MTX, 5ASA, Humira, Entyvio and  s/p    total colectomy and ileoanal J pouch (12/2019) and ileostomy reversal (1/2020) who now presents for hospital f/u. Admitted to [facility] 11/11-11/13/2021 and prior to that 10/13-10/16.   She was started on stelara 9/5/2021 and one injection 11/5 but despite this noted diarrhea and abd pain prior to both admissions. She has had chronic pain for years and multiple ER visits and neg labs/imaging over the last year. She was on multiple courses of ABX with Dr. Payne with minimal short term improvement and then recurrent diarrhea, nausea and abd pain. Pain improved some with morphine.   CT 11/10 with this admission:  no new acute findings in the abdomen or pelvis. Remonstrated postsurgical changes of colectomy and complex ileoanal anastomosis, with perianastomotic fluid and debris-filled dilation and smooth wall thickening of loops of small bowel loops in the pelvis appearing similar to prior study. No bowel obstruction or new areas of bowel wall thickening.    C diff neg, CRP 4 SED 34 (was 98 in october) Hg (L) 11.3  Hct 33.3  MCV 93.0 LFTs WNL On prednisone 40mg and diarrhea has slowed from 15/day to 6/day. No hematochezia. Pain is still severe--abdominal and just generalized "body pain" and some days cant even get out of bed. No N/V. Abd pain is worse at night.   Flex sig 8/2020: + pouch with congestion, edema, friability, edema + low rectal tone, neg TI; bx with chronic active inflammation of pouch and chronic active enteritis and ulceration of TI   Pouchoscopy 3/2021 moderate pouchitis and mild erythema of desirae-TI, bx of TI chronic active ileitis with apthous ulcer c/w crohns and pouch w chronic active ileitis   Colonoscopy in Sept 2020 revealed active enteritis on ileum biopsy EGD 8/25/2020 WNL  CTE 2/4/2021 s/p total colectomy with J pouch, normal small bowel--no hyperenhancement or thickening  Got Hep B booster at work since below labs

## 2021-11-18 NOTE — PHYSICAL EXAM GASTROINTESTINAL
Abdomen , soft, generalized tenderness, nondistended , no guarding or rigidity , no masses palpable , normal bowel sounds , no hepatomegaly present, + well healed surgical scars

## 2021-12-15 ENCOUNTER — TELEPHONE ENCOUNTER (OUTPATIENT)
Dept: URBAN - METROPOLITAN AREA CLINIC 35 | Facility: CLINIC | Age: 26
End: 2021-12-15

## 2022-01-03 ENCOUNTER — OFFICE VISIT (OUTPATIENT)
Dept: URBAN - METROPOLITAN AREA CLINIC 124 | Facility: CLINIC | Age: 27
End: 2022-01-03

## 2022-01-10 ENCOUNTER — OUT OF OFFICE VISIT (OUTPATIENT)
Dept: URBAN - METROPOLITAN AREA MEDICAL CENTER 18 | Facility: MEDICAL CENTER | Age: 27
End: 2022-01-10
Payer: COMMERCIAL

## 2022-01-10 DIAGNOSIS — K50.80 CROHN'S COLITIS: ICD-10-CM

## 2022-01-10 DIAGNOSIS — R10.84 ABDOMINAL CRAMPING, GENERALIZED: ICD-10-CM

## 2022-01-10 PROCEDURE — 99232 SBSQ HOSP IP/OBS MODERATE 35: CPT | Performed by: INTERNAL MEDICINE

## 2022-01-18 ENCOUNTER — OFFICE VISIT (OUTPATIENT)
Dept: URBAN - METROPOLITAN AREA TELEHEALTH 2 | Facility: TELEHEALTH | Age: 27
End: 2022-01-18
Payer: COMMERCIAL

## 2022-01-18 VITALS — WEIGHT: 111 LBS | HEIGHT: 61 IN | BODY MASS INDEX: 20.96 KG/M2

## 2022-01-18 DIAGNOSIS — R10.84 GENERALIZED ABDOMINAL PAIN: ICD-10-CM

## 2022-01-18 DIAGNOSIS — E55.9 VITAMIN D DEFICIENCY: ICD-10-CM

## 2022-01-18 DIAGNOSIS — K50.80 CROHN'S DISEASE OF BOTH SMALL AND LARGE INTESTINE WITHOUT COMPLICATION: ICD-10-CM

## 2022-01-18 PROCEDURE — 99214 OFFICE O/P EST MOD 30 MIN: CPT | Performed by: INTERNAL MEDICINE

## 2022-01-18 RX ORDER — MESALAMINE 4 G/60ML
AS DIRECTED SUSPENSION RECTAL NIGHTLY
Qty: 4 KITS | Refills: 1 | Status: ON HOLD | COMMUNITY
Start: 2021-02-22

## 2022-01-18 RX ORDER — USTEKINUMAB 90 MG/ML
AS DIRECTED INJECTION, SOLUTION SUBCUTANEOUS
Qty: 1 SYRINGE | Refills: 3 | Status: ACTIVE | COMMUNITY

## 2022-01-18 RX ORDER — LACTOBACIL 2/BIFIDO 1/S.THERMO 900B CELL
TWICE A DAY AS DIRECTED PACKET (EA) ORAL
Qty: 60 PACK | Refills: 3 | Status: ON HOLD | COMMUNITY

## 2022-01-18 RX ORDER — USTEKINUMAB 90 MG/ML
AS DIRECTED INJECTION, SOLUTION SUBCUTANEOUS
Qty: 1 SYRINGE | Refills: 3 | OUTPATIENT

## 2022-01-18 RX ORDER — USTEKINUMAB 90 MG/ML
AS DIRECTED INJECTION, SOLUTION SUBCUTANEOUS
Qty: 1 PRE-FILLED PEN SYRINGE | Refills: 3 | Status: ON HOLD | COMMUNITY
Start: 2021-04-19

## 2022-01-18 RX ORDER — MESALAMINE 1.2 G/1
TAKE 4 TABLETS BY MOUTH EVERY DAY TABLET, DELAYED RELEASE ORAL
Qty: 120 TABLET | Refills: 1 | Status: ON HOLD | COMMUNITY

## 2022-01-18 RX ORDER — LACTOBACIL 2/BIFIDO 1/S.THERMO 900B CELL
AS DIRECTED PACKET (EA) ORAL
Status: ON HOLD | COMMUNITY

## 2022-01-18 NOTE — HPI-OTHER HISTORIES
26yoF w/ hx of UC (pancolitis diagnosed 12/2018), failed to respond to MTX, 5ASA, Humira, Entyvio and  s/p    total colectomy and ileoanal J pouch (12/2019) and ileostomy reversal (1/2020) who now presents for hospital f/u. She has had multiple admissions to [facility] in Oct and Nov of last year with stable findings on CT and more recently admitted to [facility] 2 weeks ago for abd pain, N/V, headaches and myalgia. Per her report CT scan and biomarkers did not show active dz and d/c on prednisone 40.   She was started on stelara 9/5/2021 and last injection was in November, overdue now but per pt needs new Rx.  CT 11/10/21 no new acute findings in the abdomen or pelvis. Remonstrated postsurgical changes of colectomy and complex ileoanal anastomosis, with perianastomotic fluid and debris-filled dilation and smooth wall thickening of loops of small bowel loops in the pelvis appearing similar to prior study. No bowel obstruction or new areas of bowel wall thickening.  11/2021: C diff neg, CRP 4 SED 34 (was 98 in october) Hg (L) 11.3  Hct 33.3  MCV 93.0 LFTs WNL  On prednisone 40mg and diarrhea has slowed from 15/day to 6/day. Occasional BRBPR. Pain is sign better. No further N/V.  Colonoscopy in Sept 2020 revealed active enteritis on ileum biopsy Flex sig 8/2020: + pouch with congestion, edema, friability, edema + low rectal tone, neg TI; bx with chronic active inflammation of pouch and chronic active enteritis and ulceration of TI  Pouchoscopy 3/2021 moderate pouchitis and mild erythema of desirae-TI, bx of TI chronic active ileitis with apthous ulcer c/w crohns and pouch w chronic active ileitis  She was on multiple courses of ABX without sign improvement in sx before starting stelara.  EGD 8/25/2020 WNL  Got Hep B booster at work since below labs

## 2022-02-17 ENCOUNTER — OFFICE VISIT (OUTPATIENT)
Dept: URBAN - METROPOLITAN AREA CLINIC 92 | Facility: CLINIC | Age: 27
End: 2022-02-17

## 2022-03-11 ENCOUNTER — CLAIMS CREATED FROM THE CLAIM WINDOW (OUTPATIENT)
Dept: URBAN - METROPOLITAN AREA CLINIC 4 | Facility: CLINIC | Age: 27
End: 2022-03-11
Payer: COMMERCIAL

## 2022-03-11 ENCOUNTER — OFFICE VISIT (OUTPATIENT)
Dept: URBAN - METROPOLITAN AREA SURGERY CENTER 16 | Facility: SURGERY CENTER | Age: 27
End: 2022-03-11
Payer: COMMERCIAL

## 2022-03-11 DIAGNOSIS — K91.850 POUCHITIS: ICD-10-CM

## 2022-03-11 PROCEDURE — G8907 PT DOC NO EVENTS ON DISCHARG: HCPCS | Performed by: INTERNAL MEDICINE

## 2022-03-11 PROCEDURE — 88305 TISSUE EXAM BY PATHOLOGIST: CPT | Performed by: PATHOLOGY

## 2022-03-11 PROCEDURE — 88342 IMHCHEM/IMCYTCHM 1ST ANTB: CPT | Performed by: PATHOLOGY

## 2022-03-11 PROCEDURE — 44386 ENDOSCOPY BOWEL POUCH/BIOP: CPT | Performed by: INTERNAL MEDICINE

## 2022-03-11 PROCEDURE — 88341 IMHCHEM/IMCYTCHM EA ADD ANTB: CPT | Performed by: PATHOLOGY

## 2022-03-11 RX ORDER — LACTOBACIL 2/BIFIDO 1/S.THERMO 900B CELL
TWICE A DAY AS DIRECTED PACKET (EA) ORAL
Qty: 60 PACK | Refills: 3 | Status: ON HOLD | COMMUNITY

## 2022-03-11 RX ORDER — MESALAMINE 4 G/60ML
AS DIRECTED SUSPENSION RECTAL NIGHTLY
Qty: 4 KITS | Refills: 1 | Status: ON HOLD | COMMUNITY
Start: 2021-02-22

## 2022-03-11 RX ORDER — LACTOBACIL 2/BIFIDO 1/S.THERMO 900B CELL
AS DIRECTED PACKET (EA) ORAL
Status: ON HOLD | COMMUNITY

## 2022-03-11 RX ORDER — MESALAMINE 1.2 G/1
TAKE 4 TABLETS BY MOUTH EVERY DAY TABLET, DELAYED RELEASE ORAL
Qty: 120 TABLET | Refills: 1 | Status: ON HOLD | COMMUNITY

## 2022-03-11 RX ORDER — USTEKINUMAB 90 MG/ML
AS DIRECTED INJECTION, SOLUTION SUBCUTANEOUS
Qty: 1 SYRINGE | Refills: 3 | Status: ACTIVE | COMMUNITY

## 2022-03-11 RX ORDER — USTEKINUMAB 90 MG/ML
AS DIRECTED INJECTION, SOLUTION SUBCUTANEOUS
Qty: 1 PRE-FILLED PEN SYRINGE | Refills: 3 | Status: ON HOLD | COMMUNITY
Start: 2021-04-19

## 2022-03-22 ENCOUNTER — OFFICE VISIT (OUTPATIENT)
Dept: URBAN - METROPOLITAN AREA TELEHEALTH 2 | Facility: TELEHEALTH | Age: 27
End: 2022-03-22
Payer: COMMERCIAL

## 2022-03-22 VITALS — BODY MASS INDEX: 19.83 KG/M2 | HEIGHT: 61 IN | WEIGHT: 105 LBS

## 2022-03-22 DIAGNOSIS — R10.84 GENERALIZED ABDOMINAL PAIN: ICD-10-CM

## 2022-03-22 DIAGNOSIS — K50.80 CROHN'S DISEASE OF BOTH SMALL AND LARGE INTESTINE WITHOUT COMPLICATION: ICD-10-CM

## 2022-03-22 DIAGNOSIS — E55.9 VITAMIN D DEFICIENCY: ICD-10-CM

## 2022-03-22 PROCEDURE — 99214 OFFICE O/P EST MOD 30 MIN: CPT | Performed by: INTERNAL MEDICINE

## 2022-03-22 RX ORDER — USTEKINUMAB 90 MG/ML
AS DIRECTED INJECTION, SOLUTION SUBCUTANEOUS
Qty: 1 SYRINGE | Refills: 3 | Status: ACTIVE | COMMUNITY

## 2022-03-22 RX ORDER — LACTOBACIL 2/BIFIDO 1/S.THERMO 900B CELL
TWICE A DAY AS DIRECTED PACKET (EA) ORAL
Qty: 60 PACK | Refills: 3 | COMMUNITY

## 2022-03-22 RX ORDER — MESALAMINE 4 G/60ML
AS DIRECTED SUSPENSION RECTAL NIGHTLY
Qty: 4 KITS | Refills: 1 | COMMUNITY
Start: 2021-02-22

## 2022-03-22 RX ORDER — MESALAMINE 1.2 G/1
TAKE 4 TABLETS BY MOUTH EVERY DAY TABLET, DELAYED RELEASE ORAL
Qty: 120 TABLET | Refills: 1 | COMMUNITY

## 2022-03-22 RX ORDER — USTEKINUMAB 90 MG/ML
AS DIRECTED INJECTION, SOLUTION SUBCUTANEOUS
Qty: 1 SYRINGE | Refills: 3 | OUTPATIENT

## 2022-03-22 RX ORDER — LACTOBACIL 2/BIFIDO 1/S.THERMO 900B CELL
AS DIRECTED PACKET (EA) ORAL
Status: ON HOLD | COMMUNITY

## 2022-03-22 RX ORDER — USTEKINUMAB 90 MG/ML
AS DIRECTED INJECTION, SOLUTION SUBCUTANEOUS
Qty: 1 PRE-FILLED PEN SYRINGE | Refills: 3 | COMMUNITY
Start: 2021-04-19

## 2022-03-22 NOTE — HPI-OTHER HISTORIES
27yoF w/ hx of UC (pancolitis diagnosed 12/2018), failed to respond to MTX, 5ASA, Humira, Entyvio and  s/p total colectomy and ileoanal J pouch (12/2019) and ileostomy reversal (1/2020)  now presents for f/u.    She has had multiple admissions for IBD flares, most recently in Jan at [facility] and CT and biomarkers did not show active dz and d/c on prednisone 40. She was on stelara Q8W from 9/5/2021 until early November (insurance issues and has not had since 11/2021) after failing multiple courses of ABX for chronic pouchitis. Notes BMs 5-10/day, loose but rare hematochezia. Still with chronic abd pain. No N/V or fevers/chills. Seeing rheum for joint pains (back and hips and general body) and dx with fibromyalgia after neg Xrays. Rx'd Duloxetine but not taking this. Pending f/u with rheum.   11/2021: CRP 4 SED 34 (was 98 in october)    EGD 8/25/2020 WNL  Colonoscopy in Sept 2020 revealed active enteritis on ileum biopsy Flex sig 8/2020: + pouch with congestion, edema, friability, edema + low rectal tone, neg TI; bx with chronic active inflammation of pouch and chronic active enteritis and ulceration of TI  Pouchoscopy 3/2021 moderate pouchitis and mild erythema of desirae-TI, bx of TI chronic active ileitis with apthous ulcer c/w crohns and pouch w chronic active ileitis  Pouchoscopy 3/11/2022 mild inflammation of pouch with normal desirae-TI, bx  pending

## 2022-03-29 ENCOUNTER — OUT OF OFFICE VISIT (OUTPATIENT)
Dept: URBAN - METROPOLITAN AREA MEDICAL CENTER 39 | Facility: MEDICAL CENTER | Age: 27
End: 2022-03-29
Payer: COMMERCIAL

## 2022-03-29 DIAGNOSIS — A04.72 C. DIFFICILE: ICD-10-CM

## 2022-03-29 DIAGNOSIS — R19.7 ACUTE DIARRHEA: ICD-10-CM

## 2022-03-29 DIAGNOSIS — K50.80 CROHN'S COLITIS: ICD-10-CM

## 2022-03-29 DIAGNOSIS — Z90.49 H/O COLECTOMY: ICD-10-CM

## 2022-03-29 PROCEDURE — 99222 1ST HOSP IP/OBS MODERATE 55: CPT | Performed by: INTERNAL MEDICINE

## 2022-03-29 PROCEDURE — 99233 SBSQ HOSP IP/OBS HIGH 50: CPT | Performed by: INTERNAL MEDICINE

## 2022-03-29 PROCEDURE — G8427 DOCREV CUR MEDS BY ELIG CLIN: HCPCS | Performed by: INTERNAL MEDICINE

## 2022-05-04 ENCOUNTER — OUT OF OFFICE VISIT (OUTPATIENT)
Dept: URBAN - METROPOLITAN AREA MEDICAL CENTER 39 | Facility: MEDICAL CENTER | Age: 27
End: 2022-05-04
Payer: COMMERCIAL

## 2022-05-04 DIAGNOSIS — R19.7 ACUTE DIARRHEA: ICD-10-CM

## 2022-05-04 DIAGNOSIS — R10.84 ABDOMINAL CRAMPING, GENERALIZED: ICD-10-CM

## 2022-05-04 DIAGNOSIS — K50.018 CROHN'S DISEASE OF DUODENUM WITH OTHER COMPLICATION: ICD-10-CM

## 2022-05-04 PROCEDURE — G8427 DOCREV CUR MEDS BY ELIG CLIN: HCPCS | Performed by: INTERNAL MEDICINE

## 2022-05-04 PROCEDURE — 99222 1ST HOSP IP/OBS MODERATE 55: CPT | Performed by: INTERNAL MEDICINE

## 2022-05-05 ENCOUNTER — OUT OF OFFICE VISIT (OUTPATIENT)
Dept: URBAN - METROPOLITAN AREA MEDICAL CENTER 39 | Facility: MEDICAL CENTER | Age: 27
End: 2022-05-05

## 2022-05-05 ENCOUNTER — CLAIMS CREATED FROM THE CLAIM WINDOW (OUTPATIENT)
Dept: URBAN - METROPOLITAN AREA MEDICAL CENTER 39 | Facility: MEDICAL CENTER | Age: 27
End: 2022-05-05
Payer: COMMERCIAL

## 2022-05-05 DIAGNOSIS — K50.80 CROHN'S COLITIS: ICD-10-CM

## 2022-05-05 DIAGNOSIS — K92.1 ACUTE MELENA: ICD-10-CM

## 2022-05-05 PROCEDURE — 99232 SBSQ HOSP IP/OBS MODERATE 35: CPT | Performed by: INTERNAL MEDICINE

## 2022-06-23 ENCOUNTER — OUT OF OFFICE VISIT (OUTPATIENT)
Dept: URBAN - METROPOLITAN AREA MEDICAL CENTER 39 | Facility: MEDICAL CENTER | Age: 27
End: 2022-06-23
Payer: COMMERCIAL

## 2022-06-23 DIAGNOSIS — R19.7 ACUTE DIARRHEA: ICD-10-CM

## 2022-06-23 DIAGNOSIS — A04.72 C. DIFFICILE: ICD-10-CM

## 2022-06-23 DIAGNOSIS — R93.3 ABN FINDINGS-GI TRACT: ICD-10-CM

## 2022-06-23 PROCEDURE — 99214 OFFICE O/P EST MOD 30 MIN: CPT | Performed by: INTERNAL MEDICINE

## 2022-06-23 PROCEDURE — 99226 SUBSEQUENT OBSERVATION CARE, PER DAY, FOR THE EVALUATION AND MANAGEMENT OF A PATIENT, WHICH REQUIRES AT LEAST 2 OF THESE 3 KEY COMPONENTS: A DETAILE: CPT | Performed by: INTERNAL MEDICINE

## 2022-06-24 PROBLEM — 442461003 HISTORY OF EXCISION OF INTESTINAL STRUCTURE: Status: ACTIVE | Noted: 2022-06-24

## 2022-06-26 ENCOUNTER — OUT OF OFFICE VISIT (OUTPATIENT)
Dept: URBAN - METROPOLITAN AREA MEDICAL CENTER 39 | Facility: MEDICAL CENTER | Age: 27
End: 2022-06-26
Payer: COMMERCIAL

## 2022-06-26 DIAGNOSIS — R93.3 ABN FINDINGS-GI TRACT: ICD-10-CM

## 2022-06-26 DIAGNOSIS — K50.00 CICATRIZING ENTEROCOLITIS: ICD-10-CM

## 2022-06-26 DIAGNOSIS — A04.72 C. DIFFICILE: ICD-10-CM

## 2022-06-26 PROCEDURE — G8427 DOCREV CUR MEDS BY ELIG CLIN: HCPCS | Performed by: INTERNAL MEDICINE

## 2022-06-26 PROCEDURE — 99222 1ST HOSP IP/OBS MODERATE 55: CPT | Performed by: INTERNAL MEDICINE

## 2022-06-26 PROCEDURE — 99233 SBSQ HOSP IP/OBS HIGH 50: CPT | Performed by: INTERNAL MEDICINE

## 2022-06-28 ENCOUNTER — OUT OF OFFICE VISIT (OUTPATIENT)
Dept: URBAN - METROPOLITAN AREA MEDICAL CENTER 39 | Facility: MEDICAL CENTER | Age: 27
End: 2022-06-28
Payer: COMMERCIAL

## 2022-06-28 DIAGNOSIS — R93.3 ABN FINDINGS-GI TRACT: ICD-10-CM

## 2022-06-28 DIAGNOSIS — K50.00 CICATRIZING ENTEROCOLITIS: ICD-10-CM

## 2022-06-28 DIAGNOSIS — A04.72 C. DIFFICILE: ICD-10-CM

## 2022-06-28 DIAGNOSIS — K92.1 ACUTE MELENA: ICD-10-CM

## 2022-06-28 PROCEDURE — 99233 SBSQ HOSP IP/OBS HIGH 50: CPT | Performed by: INTERNAL MEDICINE

## 2022-07-21 ENCOUNTER — OFFICE VISIT (OUTPATIENT)
Dept: URBAN - METROPOLITAN AREA CLINIC 92 | Facility: CLINIC | Age: 27
End: 2022-07-21

## 2022-07-21 RX ORDER — USTEKINUMAB 90 MG/ML
AS DIRECTED INJECTION, SOLUTION SUBCUTANEOUS
Qty: 1 SYRINGE | Refills: 3 | COMMUNITY

## 2022-07-21 RX ORDER — LACTOBACIL 2/BIFIDO 1/S.THERMO 900B CELL
TWICE A DAY AS DIRECTED PACKET (EA) ORAL
Qty: 60 PACK | Refills: 3 | COMMUNITY

## 2022-07-21 RX ORDER — LACTOBACIL 2/BIFIDO 1/S.THERMO 900B CELL
AS DIRECTED PACKET (EA) ORAL
COMMUNITY

## 2022-07-21 RX ORDER — USTEKINUMAB 90 MG/ML
AS DIRECTED INJECTION, SOLUTION SUBCUTANEOUS
Qty: 1 PRE-FILLED PEN SYRINGE | Refills: 3 | COMMUNITY
Start: 2021-04-19

## 2022-07-21 RX ORDER — USTEKINUMAB 90 MG/ML
AS DIRECTED INJECTION, SOLUTION SUBCUTANEOUS
Qty: 1 SYRINGE | Refills: 3 | OUTPATIENT

## 2022-07-21 RX ORDER — MESALAMINE 1.2 G/1
TAKE 4 TABLETS BY MOUTH EVERY DAY TABLET, DELAYED RELEASE ORAL
Qty: 120 TABLET | Refills: 1 | COMMUNITY

## 2022-07-21 RX ORDER — MESALAMINE 4 G/60ML
AS DIRECTED SUSPENSION RECTAL NIGHTLY
Qty: 4 KITS | Refills: 1 | COMMUNITY
Start: 2021-02-22

## 2022-07-21 NOTE — HPI-OTHER HISTORIES
27yoF w/ hx of UC (pancolitis diagnosed 12/2018), failed to respond to MTX, 5ASA, Humira, Entyvio and  s/p total colectomy and ileoanal J pouch (12/2019) and ileostomy reversal (1/2020)  now presents for f/u of pouchitis, refractory to multiple courses of ABX for chronic pouchitis.  She has had multiple admissions for IBD flares, most recently in Jan at [facility] and CT and biomarkers did not show active dz and d/c on prednisone 40. She was on stelara Q8W from 9/5/2021 until early November and then had a gap 2' insurance issues from 11/2021-3/2021 when she restarted stelara??  Seeing rheum for joint pains (back and hips and general body) and dx with fibromyalgia after neg Xrays ?rheum f/u  11/2021: CRP 4 SED 34 (was 98 in october)    EGD 8/25/2020 WNL  Colonoscopy in Sept 2020 revealed active enteritis on ileum biopsy Flex sig 8/2020: + pouch with congestion, edema, friability, edema + low rectal tone, neg TI; bx with chronic active inflammation of pouch and chronic active enteritis and ulceration of TI  Pouchoscopy 3/2021 moderate pouchitis and mild erythema of desirae-TI, bx of TI chronic active ileitis with apthous ulcer c/w crohns and pouch w chronic active ileitis  Pouchoscopy 3/11/2022 mild inflammation of pouch with normal desirae-TI, bx  w/ chronic active pouchitis

## 2022-07-22 ENCOUNTER — OFFICE VISIT (OUTPATIENT)
Dept: URBAN - METROPOLITAN AREA TELEHEALTH 2 | Facility: TELEHEALTH | Age: 27
End: 2022-07-22
Payer: COMMERCIAL

## 2022-07-22 ENCOUNTER — TELEPHONE ENCOUNTER (OUTPATIENT)
Dept: URBAN - METROPOLITAN AREA CLINIC 92 | Facility: CLINIC | Age: 27
End: 2022-07-22

## 2022-07-22 DIAGNOSIS — K50.80 CROHN'S DISEASE OF BOTH SMALL AND LARGE INTESTINE WITHOUT COMPLICATION: ICD-10-CM

## 2022-07-22 DIAGNOSIS — E55.9 VITAMIN D DEFICIENCY: ICD-10-CM

## 2022-07-22 DIAGNOSIS — R10.84 GENERALIZED ABDOMINAL PAIN: ICD-10-CM

## 2022-07-22 PROCEDURE — 99214 OFFICE O/P EST MOD 30 MIN: CPT | Performed by: INTERNAL MEDICINE

## 2022-07-22 RX ORDER — MESALAMINE 1.2 G/1
TAKE 4 TABLETS BY MOUTH EVERY DAY TABLET, DELAYED RELEASE ORAL
Qty: 120 TABLET | Refills: 1 | COMMUNITY

## 2022-07-22 RX ORDER — USTEKINUMAB 90 MG/ML
AS DIRECTED INJECTION, SOLUTION SUBCUTANEOUS
Qty: 1 SYRINGE | Refills: 3 | Status: ACTIVE | COMMUNITY

## 2022-07-22 RX ORDER — MESALAMINE 4 G/60ML
AS DIRECTED SUSPENSION RECTAL NIGHTLY
Qty: 4 KITS | Refills: 1 | COMMUNITY
Start: 2021-02-22

## 2022-07-22 RX ORDER — LACTOBACIL 2/BIFIDO 1/S.THERMO 900B CELL
AS DIRECTED PACKET (EA) ORAL
COMMUNITY

## 2022-07-22 RX ORDER — RISANKIZUMAB-RZAA 60 MG/ML
AS DIRECTED INJECTION INTRAVENOUS
Qty: 1 VIAL | Refills: 0 | OUTPATIENT
Start: 2022-07-22 | End: 2022-09-16

## 2022-07-22 RX ORDER — USTEKINUMAB 90 MG/ML
AS DIRECTED INJECTION, SOLUTION SUBCUTANEOUS
Qty: 1 PRE-FILLED PEN SYRINGE | Refills: 3 | COMMUNITY
Start: 2021-04-19

## 2022-07-22 RX ORDER — LACTOBACIL 2/BIFIDO 1/S.THERMO 900B CELL
TWICE A DAY AS DIRECTED PACKET (EA) ORAL
Qty: 60 PACK | Refills: 3 | COMMUNITY

## 2022-07-22 NOTE — HPI-OTHER HISTORIES
27yoF w/ hx of UC (pancolitis diagnosed 12/2018), failed to respond to MTX, 5ASA, Humira, Entyvio and  s/p total colectomy and ileoanal J pouch (12/2019) and ileostomy reversal (1/2020)  now presents for f/u of pouchitis, refractory to multiple courses of ABX for chronic pouchitis.  She has had multiple admissions for IBD flares, most recently in Jan at [facility] and CT and biomarkers did not show active dz and d/c on prednisone 40. She was on stelara Q8W from 9/5/2021 until early November and then had a gap 2' insurance issues from 11/2021-3/2021 when she restarted stelara Q8W. Despite being back on therapy she has had 3 ER visits to [facility]--in May X 1 and June X 2. In may she had pain, N/V and diarrhea. CT with No acute findings within the abdomen and pelvis. Stable postsurgical changes, status post colectomy with ileoanal anastomosis. Unchanged patulous bowel segment at the anastomosis. No bowel obstruction. Given 1m steroid taper. Re-admitted 6/24 and CT showed postsurgical change of colectomy with distal bowel dilation at the anastomoses. There is mild circumferential distal bowel wall thickening and air-fluid levels without significant surrounding inflammation. Findings suggestive of enteritis, possibly relating to patient's known Crohn's disease. Repeat CT 6/26: Unchanged exam with postsurgical changes of colectomy with bowel dilation and bowel wall thickening adjacent to the anastomotic sutures with air-fluid levels, findings may reflect enteritis related to known Crohn's disease.  C diff EIA toxin neg. She was d/c on steroids and notes on 40mg feels better but as you start to taper abd pain, nausea and diarrhea returns. Currently on pred 10mg/day and having BMs 8/day, some hematochezia + urgency. No N/V. SED 28 CRP 3.8  Slight migration of the 3 mm right renal calcification within the renal pelvis. No obstructive uropathy.  Seeing rheum for joint pains (back and hips and general body) and dx with fibromyalgia after neg Xrays ?rheum f/u  11/2021: CRP 4 SED 34 (was 98 in october)    EGD 8/25/2020 WNL  Colonoscopy in Sept 2020 revealed active enteritis on ileum biopsy Flex sig 8/2020: + pouch with congestion, edema, friability, edema + low rectal tone, neg TI; bx with chronic active inflammation of pouch and chronic active enteritis and ulceration of TI  Pouchoscopy 3/2021 moderate pouchitis and mild erythema of desirae-TI, bx of TI chronic active ileitis with apthous ulcer c/w crohns and pouch w chronic active ileitis Pouchoscopy 3/11/2022 mild inflammation of pouch with normal desirae-TI, bx  w/ chronic active pouchitis

## 2022-08-02 LAB
MITOGEN-NIL: >10
NIL: 0.04
QUANTIFERON(R)-TB GOLD: NEGATIVE
TB1-NIL: 0
TB2-NIL: 0

## 2022-10-07 ENCOUNTER — TELEPHONE ENCOUNTER (OUTPATIENT)
Dept: URBAN - METROPOLITAN AREA CLINIC 92 | Facility: CLINIC | Age: 27
End: 2022-10-07

## 2022-10-12 ENCOUNTER — WEB ENCOUNTER (OUTPATIENT)
Dept: URBAN - METROPOLITAN AREA TELEHEALTH 2 | Facility: TELEHEALTH | Age: 27
End: 2022-10-12

## 2022-10-14 ENCOUNTER — OFFICE VISIT (OUTPATIENT)
Dept: URBAN - METROPOLITAN AREA TELEHEALTH 2 | Facility: TELEHEALTH | Age: 27
End: 2022-10-14
Payer: COMMERCIAL

## 2022-10-14 VITALS — WEIGHT: 105 LBS | BODY MASS INDEX: 19.83 KG/M2 | HEIGHT: 61 IN

## 2022-10-14 DIAGNOSIS — E55.9 VITAMIN D DEFICIENCY: ICD-10-CM

## 2022-10-14 DIAGNOSIS — M19.90 ARTHRITIS: ICD-10-CM

## 2022-10-14 DIAGNOSIS — R10.84 GENERALIZED ABDOMINAL PAIN: ICD-10-CM

## 2022-10-14 DIAGNOSIS — K50.80 CROHN'S DISEASE OF BOTH SMALL AND LARGE INTESTINE WITHOUT COMPLICATION: ICD-10-CM

## 2022-10-14 PROCEDURE — 99214 OFFICE O/P EST MOD 30 MIN: CPT | Performed by: INTERNAL MEDICINE

## 2022-10-14 NOTE — HPI-OTHER HISTORIES
27yoF w/ hx of UC (pancolitis diagnosed 12/2018), failed to respond to MTX, 5ASA, Humira, Entyvio and  s/p total colectomy and ileoanal J pouch (12/2019) and ileostomy reversal (1/2020)  now presents for f/u of pouchitis, refractory to multiple courses of ABX. .  She has had multiple admissions for IBD flares over the last 2 years despite stelara. Most recent in June at [facility] and CT showed postsurgical change of colectomy with distal bowel dilation at the anastomoses. Mild circumferential distal bowel wall thickening and air-fluid levels without significant surrounding inflammation. Findings suggestive of enteritis, possibly relating to patient's known Crohn's disease.  C diff EIA toxin neg. She was d/c on steroids--SED 28 (was 98 in october)   EGD 8/25/2020 WNL  Colonoscopy in Sept 2020 revealed active enteritis on ileum biopsy Flex sig 8/2020: + pouch with congestion, edema, friability, edema + low rectal tone, neg TI; bx with chronic active inflammation of pouch and chronic active enteritis and ulceration of TI  Pouchoscopy 3/2021 moderate pouchitis and mild erythema of desirae-TI, bx of TI chronic active ileitis with apthous ulcer c/w crohns and pouch w chronic active ileitis Pouchoscopy 3/11/2022 mild inflammation of pouch with normal desirae-TI, bx  w/ chronic active pouchitis  Transitioned to Skyrizi sp infusion #1, due for #2 next week and no recent pain and bowel frequency down from 10+ to 5-8. No hematochezia or melena. Mild arthritis of hips and backs, improved. Saw rheum and rec PT. Denies N/V, fevers, chills Reports receiving hep b vaccines

## 2022-12-19 ENCOUNTER — OFFICE VISIT (OUTPATIENT)
Dept: URBAN - METROPOLITAN AREA TELEHEALTH 2 | Facility: TELEHEALTH | Age: 27
End: 2022-12-19

## 2022-12-19 NOTE — HPI-OTHER HISTORIES
27yoF w/ hx of UC (pancolitis diagnosed 12/2018), failed to respond to MTX, 5ASA, Humira, Entyvio and  s/p total colectomy and ileoanal J pouch (12/2019) and ileostomy reversal (1/2020)  now presents for f/u of pouchitis, refractory to multiple courses of ABX. .  She has had multiple admissions for IBD flares over the last 2 years despite stelara. Most recent in June at ST and CT showed postsurgical change of colectomy with distal bowel dilation at the anastomoses. Mild circumferential distal bowel wall thickening and air-fluid levels without significant surrounding inflammation. Findings suggestive of enteritis, possibly relating to patient's known Crohn's disease.  C diff EIA toxin neg. She was d/c on steroids--SED 28 (was 98 in october)   EGD 8/25/2020 WNL  Colonoscopy in Sept 2020 revealed active enteritis on ileum biopsy Flex sig 8/2020: + pouch with congestion, edema, friability, edema + low rectal tone, neg TI; bx with chronic active inflammation of pouch and chronic active enteritis and ulceration of TI  Pouchoscopy 3/2021 moderate pouchitis and mild erythema of desirae-TI, bx of TI chronic active ileitis with apthous ulcer c/w crohns and pouch w chronic active ileitis Pouchoscopy 3/11/2022 mild inflammation of pouch with normal desirae-TI, bx  w/ chronic active pouchitis  Transitioned to Skyrizi sp infusion #3 now and ?recent pain and bowel frequency down from 10+ to 5-8. No hematochezia or melena. Mild arthritis of hips and backs, improved. Saw rheum and rec PT. Denies N/V, fevers, chills Reports receiving hep b vaccines

## 2022-12-20 ENCOUNTER — TELEPHONE ENCOUNTER (OUTPATIENT)
Dept: URBAN - METROPOLITAN AREA CLINIC 92 | Facility: CLINIC | Age: 27
End: 2022-12-20

## 2023-01-12 ENCOUNTER — TELEPHONE ENCOUNTER (OUTPATIENT)
Dept: URBAN - METROPOLITAN AREA CLINIC 5 | Facility: CLINIC | Age: 28
End: 2023-01-12

## 2023-01-12 ENCOUNTER — OFFICE VISIT (OUTPATIENT)
Dept: URBAN - METROPOLITAN AREA TELEHEALTH 2 | Facility: TELEHEALTH | Age: 28
End: 2023-01-12
Payer: COMMERCIAL

## 2023-01-12 VITALS — BODY MASS INDEX: 19.83 KG/M2 | WEIGHT: 105 LBS | HEIGHT: 61 IN

## 2023-01-12 DIAGNOSIS — M19.90 ARTHRITIS: ICD-10-CM

## 2023-01-12 DIAGNOSIS — K50.80 CROHN'S DISEASE OF BOTH SMALL AND LARGE INTESTINE WITHOUT COMPLICATION: ICD-10-CM

## 2023-01-12 DIAGNOSIS — E55.9 VITAMIN D DEFICIENCY: ICD-10-CM

## 2023-01-12 DIAGNOSIS — R10.84 GENERALIZED ABDOMINAL PAIN: ICD-10-CM

## 2023-01-12 PROBLEM — 3723001: Status: ACTIVE | Noted: 2022-10-14

## 2023-01-12 PROBLEM — 34713006: Status: ACTIVE | Noted: 2021-11-18

## 2023-01-12 PROCEDURE — 99214 OFFICE O/P EST MOD 30 MIN: CPT | Performed by: INTERNAL MEDICINE

## 2023-01-12 NOTE — HPI-OTHER HISTORIES
27yoF w/ hx of UC (pancolitis diagnosed 12/2018), failed to respond to MTX, 5ASA, Humira, Entyvio and  s/p total colectomy and ileoanal J pouch (12/2019) and ileostomy reversal (1/2020)  now presents for f/u of pouchitis, refractory to multiple courses of ABX.  She has had multiple admissions for IBD flares over the last 2 years despite stelara. Most recent in June at [facility] and CT showed postsurgical change of colectomy with distal bowel dilation at the anastomoses. Mild circumferential distal bowel wall thickening and air-fluid levels without significant surrounding inflammation. Findings suggestive of enteritis, possibly relating to patient's known Crohn's disease.  C diff EIA toxin neg. She was d/c on steroids--SED 28 (was 98 in october)   EGD 8/25/2020 WNL  Colonoscopy in Sept 2020 revealed active enteritis on ileum biopsy Flex sig 8/2020: + pouch with congestion, edema, friability, edema + low rectal tone, neg TI; bx with chronic active inflammation of pouch and chronic active enteritis and ulceration of TI  Pouchoscopy 3/2021 moderate pouchitis and mild erythema of desirae-TI, bx of TI chronic active ileitis with apthous ulcer c/w crohns and pouch w chronic active ileitis Pouchoscopy 3/11/2022 mild inflammation of pouch with normal desirae-TI, bx  w/ chronic active pouchitis  Transitioned to Skyrizi, completed her 3rd infusion in Nov, pending maintenance (currently 1m late) and notes over the last month intermittent abd pain and then during the last 2 weeks had increased abd pain, diarrhea, N/V. She was admitted yesterday to [facility] and dx with a UTI/?pyelo, on ABX with improvement in sx. Per patient C diff was negative. CT scan without any active crohns.   Reports receiving hep b vaccine Pt had lab/stool order to do but has not done it yet

## 2023-03-07 PROBLEM — 71833008: Status: ACTIVE | Noted: 2021-04-19

## 2023-03-15 ENCOUNTER — TELEPHONE ENCOUNTER (OUTPATIENT)
Dept: URBAN - METROPOLITAN AREA CLINIC 35 | Facility: CLINIC | Age: 28
End: 2023-03-15

## 2023-03-15 ENCOUNTER — WEB ENCOUNTER (OUTPATIENT)
Dept: URBAN - METROPOLITAN AREA CLINIC 92 | Facility: CLINIC | Age: 28
End: 2023-03-15

## 2023-03-15 LAB
ALBUMIN/GLOBULIN RATIO: 1.1
ALBUMIN: 4.6
ALKALINE PHOSPHATASE: 66
ALT (SGPT): 17
AST (SGOT): 23
BILIRUBIN, DIRECT: 0.1
BILIRUBIN, INDIRECT: 0.3
BILIRUBIN, TOTAL: 0.4
C-REACTIVE PROTEIN, QUANT: 3.1
GLOBULIN: 4.2
HEPATITIS B SURFACE AB IMMUNITY, QN: 114
PROTEIN, TOTAL: 8.8
SED RATE BY MODIFIED: 97
VITAMIN D,25-OH,TOTAL,IA: 18

## 2023-03-15 RX ORDER — CHOLECALCIFEROL TAB 50 MCG (2000 UNIT) 50 MCG
ONCE PER WEEK TAB ORAL
Qty: 4 | Refills: 5
Start: 2021-04-19 | End: 2023-08-30

## 2023-03-17 ENCOUNTER — OFFICE VISIT (OUTPATIENT)
Dept: URBAN - METROPOLITAN AREA TELEHEALTH 2 | Facility: TELEHEALTH | Age: 28
End: 2023-03-17

## 2023-05-12 ENCOUNTER — OFFICE VISIT (OUTPATIENT)
Dept: URBAN - METROPOLITAN AREA SURGERY CENTER 16 | Facility: SURGERY CENTER | Age: 28
End: 2023-05-12
Payer: COMMERCIAL

## 2023-05-12 DIAGNOSIS — K91.850 CHRONIC ANTIBIOTIC-DEPENDENT ILEAL POUCHITIS: ICD-10-CM

## 2023-05-12 DIAGNOSIS — Z90.49 ABSENCE OF GALLBLADDER: ICD-10-CM

## 2023-05-12 PROCEDURE — G8907 PT DOC NO EVENTS ON DISCHARG: HCPCS | Performed by: INTERNAL MEDICINE

## 2023-05-12 PROCEDURE — 44386 ENDOSCOPY BOWEL POUCH/BIOP: CPT | Performed by: INTERNAL MEDICINE

## 2023-05-12 RX ORDER — CHOLECALCIFEROL TAB 50 MCG (2000 UNIT) 50 MCG
ONCE PER WEEK TAB ORAL
Qty: 4 | Refills: 5 | Status: ACTIVE | COMMUNITY
Start: 2021-04-19 | End: 2023-08-30

## 2023-06-30 ENCOUNTER — OFFICE VISIT (OUTPATIENT)
Dept: URBAN - METROPOLITAN AREA TELEHEALTH 2 | Facility: TELEHEALTH | Age: 28
End: 2023-06-30

## 2023-06-30 VITALS — HEIGHT: 61 IN

## 2023-06-30 RX ORDER — CHOLECALCIFEROL TAB 50 MCG (2000 UNIT) 50 MCG
ONCE PER WEEK TAB ORAL
Qty: 4 | Refills: 5 | Status: ACTIVE | COMMUNITY
Start: 2021-04-19 | End: 2023-08-30

## 2023-06-30 NOTE — HPI-OTHER HISTORIES
28yoF w/ hx of UC (pancolitis diagnosed 12/2018), failed to respond to MTX, 5ASA, Humira, Entyvio and  s/p total colectomy and ileoanal J pouch (12/2019) and ileostomy reversal (1/2020)  now presents for f/u of pouchitis, refractory to multiple courses of ABX.   She has had multiple admissions for IBD flares over the last 2 years despite stelara. Most recent in June at [facility] and CT showed postsurgical change of colectomy with distal bowel dilation at the anastomoses. Mild circumferential distal bowel wall thickening and air-fluid levels without significant surrounding inflammation. Findings suggestive of enteritis, possibly relating to patient's known Crohn's disease.  C diff EIA toxin neg. She was d/c on steroids--SED 28 (was 98 in october)   EGD 8/25/2020 WNL  Colonoscopy in Sept 2020 revealed active enteritis on ileum biopsy Flex sig 8/2020: + pouch with congestion, edema, friability, edema + low rectal tone, neg TI; bx with chronic active inflammation of pouch and chronic active enteritis and ulceration of TI  Pouchoscopy 3/2021 moderate pouchitis and mild erythema of desirae-TI, bx of TI chronic active ileitis with apthous ulcer c/w crohns and pouch w chronic active ileitis Pouchoscopy 3/11/2022 mild inflammation of pouch with normal desirae-TI, bx  w/ chronic active pouchitis  Transitioned to Skyrizi, completed her 3rd infusion in Nov was late for maintenance dose and during that interval admitted to [facility]. UTI/?pyelo--CT scan per patient without any active crohns. She received maintenance?  Reports receiving hep b vaccine Pt had lab/stool order to do but has not done it yet

## 2023-08-11 ENCOUNTER — TELEPHONE ENCOUNTER (OUTPATIENT)
Dept: URBAN - METROPOLITAN AREA CLINIC 92 | Facility: CLINIC | Age: 28
End: 2023-08-11

## 2023-08-11 ENCOUNTER — OFFICE VISIT (OUTPATIENT)
Dept: URBAN - METROPOLITAN AREA TELEHEALTH 2 | Facility: TELEHEALTH | Age: 28
End: 2023-08-11
Payer: COMMERCIAL

## 2023-08-11 VITALS — HEIGHT: 61 IN | BODY MASS INDEX: 19.83 KG/M2 | WEIGHT: 105 LBS

## 2023-08-11 DIAGNOSIS — R10.84 GENERALIZED ABDOMINAL PAIN: ICD-10-CM

## 2023-08-11 DIAGNOSIS — E55.9 VITAMIN D DEFICIENCY: ICD-10-CM

## 2023-08-11 DIAGNOSIS — K50.80 CROHN'S DISEASE OF BOTH SMALL AND LARGE INTESTINE WITHOUT COMPLICATION: ICD-10-CM

## 2023-08-11 DIAGNOSIS — M19.90 ARTHRITIS: ICD-10-CM

## 2023-08-11 PROCEDURE — 99215 OFFICE O/P EST HI 40 MIN: CPT | Performed by: INTERNAL MEDICINE

## 2023-08-11 RX ORDER — CHOLECALCIFEROL TAB 50 MCG (2000 UNIT) 50 MCG
ONCE PER WEEK TAB ORAL
Qty: 4 | Refills: 5 | Status: ON HOLD | COMMUNITY
Start: 2021-04-19 | End: 2023-08-30

## 2023-08-11 RX ORDER — UPADACITINIB 45 MG/1
AS DIRECTED TABLET, EXTENDED RELEASE ORAL ONCE A DAY
Qty: 84 | Refills: 0 | OUTPATIENT
Start: 2023-08-11 | End: 2023-11-03

## 2023-08-11 RX ORDER — ZOSTER VACCINE RECOMBINANT, ADJUVANTED 50 MCG/0.5
AS DIRECTED KIT INTRAMUSCULAR
Qty: 0.5 ML | Refills: 1 | OUTPATIENT
Start: 2023-08-11

## 2023-08-11 NOTE — HPI-OTHER HISTORIES
28yoF w/ hx of UC (pancolitis diagnosed 12/2018), failed to respond to MTX, 5ASA, Humira, Entyvio and  s/p total colectomy and ileoanal J pouch (12/2019) and ileostomy reversal (1/2020)  now presents for f/u of pouchitis, refractory to multiple courses of ABX as well as advanced therapy.   She has had multiple admissions for IBD flares over the years despite stelara and skyrizi which she has been on since Sept 2022. Still with clinical sx intermittent abd pain, nausea, anorexia and frequent BMs. Reports C diff months ago sp ABX. Labs with PCP showed elevated SED.   EGD 8/25/2020 WNL  Colonoscopy in Sept 2020 revealed active enteritis on ileum biopsy Flex sig 8/2020: + pouch with congestion, edema, friability, edema + low rectal tone, neg TI; bx with chronic active inflammation of pouch and chronic active enteritis and ulceration of TI  Pouchoscopy 3/2021 moderate pouchitis and mild erythema of desirae-TI, bx of TI chronic active ileitis with apthous ulcer c/w crohns and pouch w chronic active ileitis Pouchoscopy 3/11/2022 mild inflammation of pouch with normal desirae-TI, bx  w/ chronic active pouchitis Pouchoscopy 5/12/2023 showed moderate pouchitis but normal desirae-TI, bx + active inflammation/ulceration  She has not had the shingles vax.

## 2023-08-17 ENCOUNTER — TELEPHONE ENCOUNTER (OUTPATIENT)
Dept: URBAN - METROPOLITAN AREA CLINIC 92 | Facility: CLINIC | Age: 28
End: 2023-08-17

## 2023-08-17 PROBLEM — 234349007: Status: ACTIVE | Noted: 2023-08-17

## 2023-08-17 LAB
A/G RATIO: 1
ABSOLUTE BASOPHILS: 59
ABSOLUTE EOSINOPHILS: 533
ABSOLUTE LYMPHOCYTES: 2035
ABSOLUTE MONOCYTES: 407
ABSOLUTE NEUTROPHILS: 4366
ALBUMIN: 4.1
ALKALINE PHOSPHATASE: 58
ALT (SGPT): 10
AST (SGOT): 18
BASOPHILS: 0.8
BILIRUBIN, TOTAL: 0.4
BUN/CREATININE RATIO: (no result)
BUN: 11
C-REACTIVE PROTEIN, QUANT: 6.4
CALCIUM: 9.5
CARBON DIOXIDE, TOTAL: 20
CHLORIDE: 102
CREATININE: 0.94
EGFR: 85
EOSINOPHILS: 7.2
GLOBULIN, TOTAL: 4.1
GLUCOSE: 101
HEMATOCRIT: 31.4
HEMOGLOBIN: 8.7
LYMPHOCYTES: 27.5
MCH: 20.5
MCHC: 27.7
MCV: 74.1
MITOGEN-NIL: 4.43
MONOCYTES: 5.5
MPV: 10.6
NEUTROPHILS: 59
PLATELET COUNT: 675
POTASSIUM: 4.6
PROTEIN, TOTAL: 8.2
QUANTIFERON NIL VALUE: 0.07
QUANTIFERON TB1 AG VALUE: 0
QUANTIFERON TB2 AG VALUE: 0
QUANTIFERON-TB GOLD PLUS: NEGATIVE
RDW: 15.4
RED BLOOD CELL COUNT: 4.24
SED RATE BY MODIFIED: 106
SODIUM: 136
VITAMIN D,25-OH,TOTAL,IA: 13
WHITE BLOOD CELL COUNT: 7.4

## 2023-08-17 RX ORDER — CHOLECALCIFEROL TAB 50 MCG (2000 UNIT) 50 MCG
ONCE PER WEEK TAB ORAL
Qty: 4 | Refills: 5
Start: 2021-04-19 | End: 2024-02-01

## 2023-08-19 ENCOUNTER — TELEPHONE ENCOUNTER (OUTPATIENT)
Dept: URBAN - METROPOLITAN AREA CLINIC 92 | Facility: CLINIC | Age: 28
End: 2023-08-19

## 2023-08-19 PROBLEM — 724556004: Status: ACTIVE | Noted: 2023-08-19

## 2023-08-19 LAB
FERRITIN, SERUM: 4
IRON BIND.CAP.(TIBC): 536
IRON SATURATION: 4
IRON: 21

## 2023-08-19 RX ORDER — FERRIC CARBOXYMALTOSE INJECTION 50 MG/ML
AS DIRECTED INJECTION, SOLUTION INTRAVENOUS
Qty: 2 | Refills: 0 | OUTPATIENT
Start: 2023-08-19 | End: 2023-09-02

## 2023-08-21 ENCOUNTER — WEB ENCOUNTER (OUTPATIENT)
Dept: URBAN - METROPOLITAN AREA CLINIC 92 | Facility: CLINIC | Age: 28
End: 2023-08-21

## 2023-08-21 RX ORDER — CHOLECALCIFEROL TAB 50 MCG (2000 UNIT) 50 MCG
ONCE PER WEEK TAB ORAL
Qty: 4 | Refills: 5
Start: 2021-04-19 | End: 2024-02-05

## 2023-08-24 ENCOUNTER — TELEPHONE ENCOUNTER (OUTPATIENT)
Dept: URBAN - METROPOLITAN AREA CLINIC 92 | Facility: CLINIC | Age: 28
End: 2023-08-24

## 2023-08-28 LAB — CALPROTECTIN, FECAL: 65

## 2023-08-30 ENCOUNTER — TELEPHONE ENCOUNTER (OUTPATIENT)
Dept: URBAN - METROPOLITAN AREA CLINIC 91 | Facility: CLINIC | Age: 28
End: 2023-08-30

## 2023-08-30 RX ORDER — IRON SUCROSE 20 MG/ML
AS DIRECTED INJECTION, SOLUTION INTRAVENOUS
Qty: 5 | Refills: 0 | OUTPATIENT
Start: 2023-08-30 | End: 2023-09-13

## 2023-09-03 ENCOUNTER — ERX REFILL RESPONSE (OUTPATIENT)
Dept: URBAN - METROPOLITAN AREA CLINIC 92 | Facility: CLINIC | Age: 28
End: 2023-09-03

## 2023-09-03 RX ORDER — ERGOCALCIFEROL 1.25 MG/1
TAKE 1 CAPSULE BY MOUTH ONCE WEEKLY CAPSULE, LIQUID FILLED ORAL
Qty: 12 CAPSULE | Refills: 2 | OUTPATIENT

## 2023-10-16 ENCOUNTER — OFFICE VISIT (OUTPATIENT)
Dept: URBAN - METROPOLITAN AREA TELEHEALTH 2 | Facility: TELEHEALTH | Age: 28
End: 2023-10-16

## 2023-10-16 VITALS — BODY MASS INDEX: 19.83 KG/M2 | WEIGHT: 105 LBS | HEIGHT: 61 IN

## 2023-10-16 RX ORDER — UPADACITINIB 45 MG/1
AS DIRECTED TABLET, EXTENDED RELEASE ORAL ONCE A DAY
Qty: 84 | Refills: 0 | OUTPATIENT

## 2023-10-16 RX ORDER — UPADACITINIB 45 MG/1
AS DIRECTED TABLET, EXTENDED RELEASE ORAL ONCE A DAY
Qty: 84 | Refills: 0 | Status: ACTIVE | COMMUNITY
Start: 2023-08-11 | End: 2023-11-03

## 2023-10-16 RX ORDER — ERGOCALCIFEROL 1.25 MG/1
TAKE 1 CAPSULE BY MOUTH ONCE WEEKLY CAPSULE, LIQUID FILLED ORAL
Qty: 12 CAPSULE | Refills: 2 | Status: ACTIVE | COMMUNITY

## 2023-10-16 RX ORDER — CHOLECALCIFEROL TAB 50 MCG (2000 UNIT) 50 MCG
ONCE PER WEEK TAB ORAL
Qty: 4 | Refills: 5 | Status: ACTIVE | COMMUNITY
Start: 2021-04-19 | End: 2024-02-05

## 2023-10-16 RX ORDER — ZOSTER VACCINE RECOMBINANT, ADJUVANTED 50 MCG/0.5
AS DIRECTED KIT INTRAMUSCULAR
Qty: 0.5 ML | Refills: 1 | Status: ACTIVE | COMMUNITY
Start: 2023-08-11

## 2023-10-16 RX ORDER — ZOSTER VACCINE RECOMBINANT, ADJUVANTED 50 MCG/0.5
AS DIRECTED KIT INTRAMUSCULAR
Qty: 0.5 ML | Refills: 1 | OUTPATIENT

## 2023-10-16 NOTE — HPI-OTHER HISTORIES
28yoF w/ hx of UC (pancolitis diagnosed 12/2018), failed to respond to MTX, 5ASA, Humira, Entyvio and  s/p total colectomy and ileoanal J pouch (12/2019) and ileostomy reversal (1/2020)  now presents for f/u of pouchitis, refractory to multiple courses of ABX as well as advanced therapy.   She has had multiple admissions for IBD flares over the years despite stelara and skyrizi. Persistent clinical sx intermittent abd pain, nausea, anorexia and frequent BMs.   EGD 8/25/2020 WNL  Colonoscopy in Sept 2020 revealed active enteritis on ileum biopsy Flex sig 8/2020: + pouch with congestion, edema, friability, edema + low rectal tone, neg TI; bx with chronic active inflammation of pouch and chronic active enteritis and ulceration of TI  Pouchoscopy 3/2021 moderate pouchitis and mild erythema of desirae-TI, bx of TI chronic active ileitis with apthous ulcer c/w crohns and pouch w chronic active ileitis Pouchoscopy 3/11/2022 mild inflammation of pouch with normal desirae-TI, bx  w/ chronic active pouchitis Pouchoscopy 5/12/2023 showed moderate pouchitis but normal desirae-TI, bx + active inflammation/ulceration  She was initiated on Rinvoq 45 at [facility] in Aug and s/p Venofer  and advised shingrex and notes

## 2023-10-19 ENCOUNTER — TELEPHONE ENCOUNTER (OUTPATIENT)
Dept: URBAN - METROPOLITAN AREA CLINIC 92 | Facility: CLINIC | Age: 28
End: 2023-10-19

## 2023-11-20 ENCOUNTER — TELEPHONE ENCOUNTER (OUTPATIENT)
Dept: URBAN - METROPOLITAN AREA CLINIC 98 | Facility: CLINIC | Age: 28
End: 2023-11-20

## 2023-11-21 ENCOUNTER — OFFICE VISIT (OUTPATIENT)
Dept: URBAN - METROPOLITAN AREA CLINIC 92 | Facility: CLINIC | Age: 28
End: 2023-11-21
Payer: COMMERCIAL

## 2023-11-21 VITALS
WEIGHT: 101.8 LBS | TEMPERATURE: 96.1 F | HEIGHT: 61 IN | SYSTOLIC BLOOD PRESSURE: 100 MMHG | HEART RATE: 100 BPM | DIASTOLIC BLOOD PRESSURE: 67 MMHG | BODY MASS INDEX: 19.22 KG/M2

## 2023-11-21 DIAGNOSIS — K50.80 CROHN'S DISEASE OF BOTH SMALL AND LARGE INTESTINE WITHOUT COMPLICATION: ICD-10-CM

## 2023-11-21 DIAGNOSIS — R10.84 GENERALIZED ABDOMINAL PAIN: ICD-10-CM

## 2023-11-21 DIAGNOSIS — E55.9 VITAMIN D DEFICIENCY: ICD-10-CM

## 2023-11-21 DIAGNOSIS — R11.2 NAUSEA AND VOMITING IN ADULT: ICD-10-CM

## 2023-11-21 DIAGNOSIS — R19.7 DIARRHEA, UNSPECIFIED TYPE: ICD-10-CM

## 2023-11-21 DIAGNOSIS — M19.90 ARTHRITIS: ICD-10-CM

## 2023-11-21 PROCEDURE — 99213 OFFICE O/P EST LOW 20 MIN: CPT

## 2023-11-21 RX ORDER — UPADACITINIB 45 MG/1
AS DIRECTED TABLET, EXTENDED RELEASE ORAL ONCE A DAY
Qty: 84 | Refills: 0 | Status: ACTIVE | COMMUNITY

## 2023-11-21 RX ORDER — CHOLECALCIFEROL TAB 50 MCG (2000 UNIT) 50 MCG
ONCE PER WEEK TAB ORAL
Qty: 4 | Refills: 5 | Status: ACTIVE | COMMUNITY
Start: 2021-04-19 | End: 2024-02-05

## 2023-11-21 RX ORDER — ERGOCALCIFEROL 1.25 MG/1
TAKE 1 CAPSULE BY MOUTH ONCE WEEKLY CAPSULE, LIQUID FILLED ORAL
Qty: 12 CAPSULE | Refills: 2 | Status: ACTIVE | COMMUNITY

## 2023-11-21 RX ORDER — ZOSTER VACCINE RECOMBINANT, ADJUVANTED 50 MCG/0.5
AS DIRECTED KIT INTRAMUSCULAR
Qty: 0.5 ML | Refills: 1 | Status: ACTIVE | COMMUNITY

## 2023-11-21 NOTE — HPI-OTHER HISTORIES
28yoF w/ hx of UC (pancolitis diagnosed 12/2018), failed to respond to MTX, 5ASA, Humira, Entyvio and  s/p total colectomy and ileoanal J pouch (12/2019) and ileostomy reversal (1/2020)  now presents for f/u of pouchitis, refractory to multiple courses of ABX as well as advanced therapy.   She has had multiple admissions for IBD flares over the years despite stelara and skyrizi. Persistent clinical sx intermittent abd pain, nausea, anorexia and frequent BMs.   EGD 8/25/2020 WNL  Colonoscopy in Sept 2020 revealed active enteritis on ileum biopsy Flex sig 8/2020: + pouch with congestion, edema, friability, edema + low rectal tone, neg TI; bx with chronic active inflammation of pouch and chronic active enteritis and ulceration of TI  Pouchoscopy 3/2021 moderate pouchitis and mild erythema of desirae-TI, bx of TI chronic active ileitis with apthous ulcer c/w crohns and pouch w chronic active ileitis Pouchoscopy 3/11/2022 mild inflammation of pouch with normal desirae-TI, bx  w/ chronic active pouchitis Pouchoscopy 5/12/2023 showed moderate pouchitis but normal desirae-TI, bx + active inflammation/ulceration  She was initiated on Rinvoq 45 at [facility] Aug [facility] advised she receive Shingrex. Patient reports pharmacies declined her getting the vaccines.   Patient presented to the ED on 11/15/23 for abdominal pain, nausea, and diarrhea for 2-4 days. Worsened with eating or drinking water. Did have some blood on the toilet tissue. Was eating less to avoid pain. Testing did not reveal complications of Crohn's disease.  CT scan revealed distended residual rectum, no evidence of obstruction. Prior CT scan on 10/10/23 revealed colectomy with J pouch, distal small bowel with mild wall thickening to the level of anastomosis suggesting mild uncomplicated enteritis   11/21/23 OV: Patient reports she has been off of Rinvoq for the past three weeks due to insurance issues. She did not notice a significant difference while on Rinvoq, took for two months. She typically has 1-2 good weeks then 3 weeks of symptoms, this is her usual pattern even while on other Crohn's medications. Recently though, she has had increased diarrhea and vomiting hence her visit to the ED. Admits to occasional episodes of fecal incontinence as well. Drinking Liquid IV and water to maintain hydration and eating a bland diet. She feels lightheaded, some fatigue, and has a decreased appetite.

## 2023-11-22 ENCOUNTER — LAB OUTSIDE AN ENCOUNTER (OUTPATIENT)
Dept: URBAN - METROPOLITAN AREA CLINIC 92 | Facility: CLINIC | Age: 28
End: 2023-11-22

## 2023-12-01 ENCOUNTER — TELEPHONE ENCOUNTER (OUTPATIENT)
Dept: URBAN - METROPOLITAN AREA CLINIC 118 | Facility: CLINIC | Age: 28
End: 2023-12-01

## 2023-12-01 ENCOUNTER — TELEPHONE ENCOUNTER (OUTPATIENT)
Dept: URBAN - METROPOLITAN AREA CLINIC 92 | Facility: CLINIC | Age: 28
End: 2023-12-01

## 2023-12-01 RX ORDER — FIDAXOMICIN 200 MG/1
1 TABLET TABLET, FILM COATED ORAL TWICE A DAY
Qty: 20 | OUTPATIENT
Start: 2023-12-01 | End: 2023-12-11

## 2023-12-03 LAB
ADENOVIRUS F 40/41: NOT DETECTED
C. DIFFICILE CHEK (GDH), STOOL - QDX: POSITIVE
C. DIFFICILE TOXIN A/B, STOOL - QDX: NEGATIVE
CAMPYLOBACTER: NOT DETECTED
CLOSTRIDIUM DIFFICILE: DETECTED
ENTAMOEBA HISTOLYTICA: NOT DETECTED
ENTEROAGGREGATIVE E.COLI: NOT DETECTED
ENTEROTOXIGENIC E.COLI: NOT DETECTED
ESCHERICHIA COLI O157: NOT DETECTED
GIARDIA LAMBLIA: NOT DETECTED
NOROVIRUS GI/GII: NOT DETECTED
ROTAVIRUS A: NOT DETECTED
SALMONELLA SPP.: NOT DETECTED
SHIGA-LIKE TOXIN PRODUCING E.COLI: NOT DETECTED
SHIGELLA SPP. / ENTEROINVASIVE E.COLI: NOT DETECTED
VIBRIO PARAHAEMOLYTICUS: NOT DETECTED
VIBRIO SPP.: NOT DETECTED
YERSINIA ENTEROCOLITICA: NOT DETECTED

## 2023-12-04 ENCOUNTER — OFFICE VISIT (OUTPATIENT)
Dept: URBAN - METROPOLITAN AREA CLINIC 124 | Facility: CLINIC | Age: 28
End: 2023-12-04

## 2023-12-04 VITALS — HEIGHT: 61 IN

## 2023-12-04 RX ORDER — UPADACITINIB 45 MG/1
AS DIRECTED TABLET, EXTENDED RELEASE ORAL ONCE A DAY
Qty: 84 | Refills: 0 | Status: ACTIVE | COMMUNITY

## 2023-12-04 RX ORDER — ZOSTER VACCINE RECOMBINANT, ADJUVANTED 50 MCG/0.5
AS DIRECTED KIT INTRAMUSCULAR
Qty: 0.5 ML | Refills: 1 | Status: ACTIVE | COMMUNITY

## 2023-12-04 RX ORDER — ERGOCALCIFEROL 1.25 MG/1
TAKE 1 CAPSULE BY MOUTH ONCE WEEKLY CAPSULE, LIQUID FILLED ORAL
Qty: 12 CAPSULE | Refills: 2 | Status: ACTIVE | COMMUNITY

## 2023-12-04 RX ORDER — CHOLECALCIFEROL TAB 50 MCG (2000 UNIT) 50 MCG
ONCE PER WEEK TAB ORAL
Qty: 4 | Refills: 5 | Status: ACTIVE | COMMUNITY
Start: 2021-04-19 | End: 2024-02-05

## 2023-12-04 NOTE — HPI-OTHER HISTORIES
28yoF w/ hx of UC (pancolitis diagnosed 12/2018), failed to respond to MTX, 5ASA, Humira, Entyvio and  s/p total colectomy and ileoanal J pouch (12/2019) and ileostomy reversal (1/2020)  now presents for f/u of pouchitis/Crohn's, refractory to multiple courses of ABX as well as advanced therapy.  She has had multiple admissions for IBD flares over the years despite stelara and skyrizi. Persistent clinical sx intermittent abd pain, nausea, anorexia and frequent BMs.   EGD 8/25/2020 WNL  Colonoscopy in Sept 2020 revealed active enteritis on ileum biopsy Flex sig 8/2020: + pouch with congestion, edema, friability, edema + low rectal tone, neg TI; bx with chronic active inflammation of pouch and chronic active enteritis and ulceration of TI  Pouchoscopy 3/2021 moderate pouchitis and mild erythema of desirae-TI, bx of TI chronic active ileitis with apthous ulcer c/w crohns and pouch w chronic active ileitis Pouchoscopy 3/11/2022 mild inflammation of pouch with normal desirae-TI, bx  w/ chronic active pouchitis Pouchoscopy 5/12/2023 showed moderate pouchitis but normal desirae-TI, bx + active inflammation/ulceration  She was initiated on Rinvoq 45 at [facility] in Aug and s/p Venofer and advised she receive Shingrex. Patient reports pharmacies declined her getting the vaccines.   Patient presented to the ED on 11/15/23 for abdominal pain, nausea, and diarrhea for 2-4 days. Worsened with eating or drinking water. Did have some blood on the toilet tissue. Was eating less to avoid pain. Testing did not reveal complications of Crohn's disease.  CT scan revealed distended residual rectum, no evidence of obstruction. Prior CT scan on 10/10/23 revealed colectomy with J pouch, distal small bowel with mild wall thickening to the level of anastomosis suggesting mild uncomplicated enteritis   When seen 11/21 was off Rinvoq for 3-4 weeks 2' insurance issues.

## 2023-12-22 ENCOUNTER — TELEPHONE ENCOUNTER (OUTPATIENT)
Dept: URBAN - METROPOLITAN AREA CLINIC 92 | Facility: CLINIC | Age: 28
End: 2023-12-22

## 2023-12-22 ENCOUNTER — CLAIMS CREATED FROM THE CLAIM WINDOW (OUTPATIENT)
Dept: URBAN - METROPOLITAN AREA TELEHEALTH 2 | Facility: TELEHEALTH | Age: 28
End: 2023-12-22
Payer: COMMERCIAL

## 2023-12-22 VITALS — BODY MASS INDEX: 19.83 KG/M2 | WEIGHT: 105 LBS | HEIGHT: 61 IN

## 2023-12-22 DIAGNOSIS — A04.72 CLOSTRIDIUM DIFFICILE COLITIS: ICD-10-CM

## 2023-12-22 DIAGNOSIS — E55.9 VITAMIN D DEFICIENCY: ICD-10-CM

## 2023-12-22 DIAGNOSIS — R10.84 GENERALIZED ABDOMINAL PAIN: ICD-10-CM

## 2023-12-22 DIAGNOSIS — M19.90 ARTHRITIS: ICD-10-CM

## 2023-12-22 DIAGNOSIS — D50.0 IRON DEFICIENCY ANEMIA DUE TO CHRONIC BLOOD LOSS: ICD-10-CM

## 2023-12-22 DIAGNOSIS — K50.80 CROHN'S DISEASE OF BOTH SMALL AND LARGE INTESTINE WITHOUT COMPLICATION: ICD-10-CM

## 2023-12-22 DIAGNOSIS — Z71.85 VACCINE COUNSELING: ICD-10-CM

## 2023-12-22 PROCEDURE — 99215 OFFICE O/P EST HI 40 MIN: CPT | Performed by: PHYSICIAN ASSISTANT

## 2023-12-22 RX ORDER — LEVONORGESTREL/ETHINYL ESTRADIOL AND ETHINYL ESTRADIOL 100-20(84)
KIT ORAL
Qty: 91 TABLET | Status: ACTIVE | COMMUNITY

## 2023-12-22 RX ORDER — OXYCODONE HYDROCHLORIDE AND ACETAMINOPHEN 5; 325 MG/1; MG/1
TAKE 1 TABLET BY MOUTH EVERY 6 HOURS AS NEEDED FOR PAIN TABLET ORAL
Qty: 12 EACH | Refills: 0 | Status: ON HOLD | COMMUNITY

## 2023-12-22 RX ORDER — ZOSTER VACCINE RECOMBINANT, ADJUVANTED 50 MCG/0.5
AS DIRECTED KIT INTRAMUSCULAR
Qty: 0.5 ML | Refills: 1 | Status: DISCONTINUED | COMMUNITY

## 2023-12-22 RX ORDER — ERGOCALCIFEROL 1.25 MG/1
TAKE 1 CAPSULE BY MOUTH ONCE WEEKLY CAPSULE, LIQUID FILLED ORAL
Qty: 12 CAPSULE | Refills: 2 | Status: DISCONTINUED | COMMUNITY

## 2023-12-22 RX ORDER — PREDNISONE 10 MG/1
TAKE 4 TABLETS (40MG TOTAL) BY MOUTH IN THE MORNING FOR 5 DAYS TABLET ORAL
Qty: 20 EACH | Refills: 0 | Status: ON HOLD | COMMUNITY

## 2023-12-22 RX ORDER — CHOLECALCIFEROL TAB 50 MCG (2000 UNIT) 50 MCG
ONCE PER WEEK TAB ORAL
Qty: 4 | Refills: 5 | Status: DISCONTINUED | COMMUNITY
Start: 2021-04-19 | End: 2024-02-05

## 2023-12-22 RX ORDER — ZOSTER VACCINE RECOMBINANT, ADJUVANTED 50 MCG/0.5
AS DIRECTED KIT INTRAMUSCULAR
OUTPATIENT
Start: 2023-12-22

## 2023-12-22 RX ORDER — ZOSTER VACCINE RECOMBINANT, ADJUVANTED 50 MCG/0.5
AS DIRECTED KIT INTRAMUSCULAR
Qty: 0.5 ML | Refills: 1 | OUTPATIENT
Start: 2023-12-22 | End: 2024-02-18

## 2023-12-22 RX ORDER — UPADACITINIB 45 MG/1
AS DIRECTED TABLET, EXTENDED RELEASE ORAL ONCE A DAY
Qty: 84 | Refills: 0 | Status: ACTIVE | COMMUNITY

## 2023-12-22 NOTE — HPI-OTHER HISTORIES
28yoF w/ hx of UC (pancolitis diagnosed 12/2018), failed to respond to MTX, 5ASA, Humira, Entyvio and  s/p total colectomy and ileoanal J pouch (12/2019) and ileostomy reversal (1/2020)  now presents for f/u of pouchitis/Crohn's, refractory to multiple courses of ABX as well as advanced therapy. She has had multiple admissions for IBD flares over the years despite stelara and skyrizi.  EGD 8/25/2020 WNL  Colonoscopy in Sept 2020 revealed active enteritis on ileum biopsy Flex sig 8/2020: + pouch with congestion, edema, friability, edema + low rectal tone, neg TI; bx with chronic active inflammation of pouch and chronic active enteritis and ulceration of TI  Pouchoscopy 3/2021 moderate pouchitis and mild erythema of desirae-TI, bx of TI chronic active ileitis with apthous ulcer c/w crohns and pouch w chronic active ileitis Pouchoscopy 3/11/2022 mild inflammation of pouch with normal desirae-TI, bx  w/ chronic active pouchitis Pouchoscopy 5/12/2023 showed moderate pouchitis but normal desirae-TI, bx + active inflammation/ulceration  She was initiated on Rinvoq 45 at [facility] in Aug and s/p Venofer and advised to receive Shingrex (not done as pharmacy wouldnt give 2' age). She had a lapse in Rinvoq for 1 month from mid Oct-mid Nov 2' insurance and she presented to the ED on 11/15/23 for abdominal pain, nausea, and diarrhea for 2-4 days.  CT scan revealed distended residual rectum, no evidence of obstruction. Prior CT scan on 10/10/23 revealed colectomy with J pouch, distal small bowel with mild wall thickening to the level of anastomosis suggesting mild uncomplicated enteritis. She was given rinvoq samples 11/21 and C diff resulted + s/p dificid and notes no further diarrhea, pain, N/V. Feels good since being on Rinvoq.

## 2024-01-04 ENCOUNTER — TELEPHONE ENCOUNTER (OUTPATIENT)
Dept: URBAN - METROPOLITAN AREA CLINIC 92 | Facility: CLINIC | Age: 29
End: 2024-01-04

## 2024-01-04 LAB
A/G RATIO: 1
ABSOLUTE BASOPHILS: 41
ABSOLUTE EOSINOPHILS: 221
ABSOLUTE LYMPHOCYTES: 2739
ABSOLUTE MONOCYTES: 442
ABSOLUTE NEUTROPHILS: 3457
ALBUMIN: 4.3
ALKALINE PHOSPHATASE: 64
ALT (SGPT): 30
AST (SGOT): 26
BASOPHILS: 0.6
BILIRUBIN, TOTAL: 0.4
BUN/CREATININE RATIO: (no result)
BUN: 11
C-REACTIVE PROTEIN, QUANT: 3.7
CALCIUM: 9.3
CARBON DIOXIDE, TOTAL: 25
CHLORIDE: 104
CHOL/HDLC RATIO: 2.5
CHOLESTEROL, TOTAL: 168
CREATININE: 0.69
EGFR: 122
EOSINOPHILS: 3.2
FERRITIN, SERUM: 1
GLOBULIN, TOTAL: 4.2
GLUCOSE: 112
HDL CHOLESTEROL: 68
HEMATOCRIT: 25.8
HEMOGLOBIN: 7.2
IRON BIND.CAP.(TIBC): 568
IRON SATURATION: 3
IRON: 18
LDL CHOLESTEROL CALC: 83
LYMPHOCYTES: 39.7
MCH: 20.5
MCHC: 27.9
MCV: 73.5
MONOCYTES: 6.4
MPV: 10.5
NEUTROPHILS: 50.1
NON HDL CHOLESTEROL: 100
PLATELET COUNT: 625
POTASSIUM: 3.9
PROTEIN, TOTAL: 8.5
RDW: 17.8
RED BLOOD CELL COUNT: 3.51
SED RATE BY MODIFIED: 84
SODIUM: 138
TRIGLYCERIDES: 84
VITAMIN D,25-OH,TOTAL,IA: 20
WHITE BLOOD CELL COUNT: 6.9

## 2024-01-04 RX ORDER — FERRIC CARBOXYMALTOSE INJECTION 50 MG/ML
AS DIRECTED INJECTION, SOLUTION INTRAVENOUS
OUTPATIENT
Start: 2024-01-04 | End: 2024-01-18

## 2024-01-11 ENCOUNTER — WEB ENCOUNTER (OUTPATIENT)
Dept: URBAN - METROPOLITAN AREA CLINIC 92 | Facility: CLINIC | Age: 29
End: 2024-01-11

## 2024-01-11 LAB — CALPROTECTIN, FECAL: 497

## 2024-03-21 ENCOUNTER — TELEP (OUTPATIENT)
Dept: URBAN - METROPOLITAN AREA TELEHEALTH 2 | Facility: TELEHEALTH | Age: 29
End: 2024-03-21
Payer: COMMERCIAL

## 2024-03-21 ENCOUNTER — LAB (OUTPATIENT)
Dept: URBAN - METROPOLITAN AREA MEDICAL CENTER 12 | Facility: MEDICAL CENTER | Age: 29
End: 2024-03-21

## 2024-03-21 ENCOUNTER — LAB (OUTPATIENT)
Dept: URBAN - METROPOLITAN AREA TELEHEALTH 2 | Facility: TELEHEALTH | Age: 29
End: 2024-03-21

## 2024-03-21 VITALS — HEIGHT: 61 IN | BODY MASS INDEX: 19.83 KG/M2 | WEIGHT: 105 LBS

## 2024-03-21 DIAGNOSIS — R10.84 GENERALIZED ABDOMINAL PAIN: ICD-10-CM

## 2024-03-21 DIAGNOSIS — Z71.85 VACCINE COUNSELING: ICD-10-CM

## 2024-03-21 DIAGNOSIS — R11.2 NAUSEA AND VOMITING, UNSPECIFIED VOMITING TYPE: ICD-10-CM

## 2024-03-21 DIAGNOSIS — M19.90 ARTHRITIS: ICD-10-CM

## 2024-03-21 DIAGNOSIS — K50.80 CROHN'S DISEASE OF BOTH SMALL AND LARGE INTESTINE WITHOUT COMPLICATION: ICD-10-CM

## 2024-03-21 DIAGNOSIS — R12 HEARTBURN: ICD-10-CM

## 2024-03-21 DIAGNOSIS — D50.0 IRON DEFICIENCY ANEMIA DUE TO CHRONIC BLOOD LOSS: ICD-10-CM

## 2024-03-21 DIAGNOSIS — A04.72 CLOSTRIDIUM DIFFICILE COLITIS: ICD-10-CM

## 2024-03-21 DIAGNOSIS — E55.9 VITAMIN D DEFICIENCY: ICD-10-CM

## 2024-03-21 PROCEDURE — 99215 OFFICE O/P EST HI 40 MIN: CPT | Performed by: PHYSICIAN ASSISTANT

## 2024-03-21 PROCEDURE — 99254 IP/OBS CNSLTJ NEW/EST MOD 60: CPT | Performed by: INTERNAL MEDICINE

## 2024-03-21 RX ORDER — FERRIC CARBOXYMALTOSE INJECTION 50 MG/ML
AS DIRECTED INJECTION, SOLUTION INTRAVENOUS
OUTPATIENT
Start: 2024-03-21 | End: 2024-04-04

## 2024-03-21 RX ORDER — PREDNISONE 10 MG/1
TAKE 4 TABLETS (40MG TOTAL) BY MOUTH IN THE MORNING FOR 5 DAYS TABLET ORAL
Qty: 20 EACH | Refills: 0 | Status: ON HOLD | COMMUNITY

## 2024-03-21 RX ORDER — ZOSTER VACCINE RECOMBINANT, ADJUVANTED 50 MCG/0.5
AS DIRECTED KIT INTRAMUSCULAR
Qty: 0.5 ML | Refills: 1 | OUTPATIENT

## 2024-03-21 RX ORDER — UPADACITINIB 45 MG/1
AS DIRECTED TABLET, EXTENDED RELEASE ORAL ONCE A DAY
Qty: 84 | Refills: 0 | Status: ACTIVE | COMMUNITY

## 2024-03-21 RX ORDER — ZOSTER VACCINE RECOMBINANT, ADJUVANTED 50 MCG/0.5
AS DIRECTED KIT INTRAMUSCULAR
Status: ACTIVE | COMMUNITY
Start: 2023-12-22

## 2024-03-21 RX ORDER — OXYCODONE HYDROCHLORIDE AND ACETAMINOPHEN 5; 325 MG/1; MG/1
TAKE 1 TABLET BY MOUTH EVERY 6 HOURS AS NEEDED FOR PAIN TABLET ORAL
Qty: 12 EACH | Refills: 0 | Status: ON HOLD | COMMUNITY

## 2024-03-21 RX ORDER — LEVONORGESTREL/ETHINYL ESTRADIOL AND ETHINYL ESTRADIOL 100-20(84)
KIT ORAL
Qty: 91 TABLET | Status: ACTIVE | COMMUNITY

## 2024-03-21 RX ORDER — OMEPRAZOLE 40 MG/1
1 CAPSULE 30 MINUTES BEFORE MORNING MEAL CAPSULE, DELAYED RELEASE ORAL ONCE A DAY
Qty: 90 | Refills: 3 | OUTPATIENT
Start: 2024-03-21

## 2024-03-21 NOTE — PHYSICAL EXAM EYES:
"Subjective:       Patient ID: Nasima Pacheco is a 75 y.o. female.    Chief Complaint: Medicare AWV (Swelling ankles evening)    HPI    Nasima Pacheco is a 75 y.o. female , Faroese speaking, with a history of:  HTN  HLD  BPPV  OA  Back pain    [Local Patient]  Originally from [place]  Lives in [facility]    Patient comes to the clinic for a depression, BP and prediabetes follow up.    She estates she has been doing well, she is taking her medications as prescribed.  BP at home 130/70 on average.    No CP, SOB, palpitations.    She has felt some "abdominal cramps??", no relation with the food, no pain, no constipation or diarrhea, no changes in her weight.  Patient states she is having BLLE swelling at the end of the day, edema around the ankles.    Patient wants to schedule her yearly F/U appointment with cardiology.      Since last seen by me:    03/13/23    Changes in health or medications: No    Specialists visits and recommendations:    8/22/23 - IM - eye pain      H/o ER visits:   NO    H/o Hospitalizations:  NO    H/o falls: None    Life events / lifestyle:   Recent travel to [place]      Most recent laboratories reviewed:    Recent Labs   Lab 09/29/21  1017 03/29/22  0957 03/13/23  1013   WBC 5.36 4.75 4.85   Hemoglobin 14.0 14.3 13.3   Hematocrit 40.6 42.4 40.7   MCV 89 92 92   Platelets 238 220 208       Recent Labs   Lab 08/06/21  1647 09/29/21  1017 03/29/22  0957 09/13/22  1426 03/13/23  1013   Glucose 105 101 92 107 105   Sodium 142 142 143 137 143   Potassium 3.9 3.8 4.1 4.0 4.1   BUN 12 13 14 15 11   Creatinine 0.6 0.6 0.7 0.7 0.6   eGFR if non African American >60.0 >60.0 >60.0  --   --    Total Bilirubin 1.2 H 1.0 1.1 H  --  0.7   AST 15 18 17  --  15   ALT 12 15 12  --  15       Recent Labs   Lab 03/29/22  0957 09/13/22  1426 03/13/23  1013   Hemoglobin A1C 5.7 H 5.7 H 5.9 H       Recent Labs   Lab 09/29/21  1017 03/29/22  0957 03/13/23  1013   Cholesterol 172 225 H 171 " Conjuntivae and eyelids appear normal , Sclerae : White without injection   Triglycerides 75 121 64   HDL 56 57 52   LDL Cholesterol 101.0 143.8 106.2   Non-HDL Cholesterol 116 168 119       Recent Labs   Lab 03/26/21  1007 09/29/21  1017 03/13/23  1013   TSH 2.648 2.283 2.131             Healthcare Maintenance:  Colonoscopy: FOBT   Vaccinations: Pneumonia 2016, Zoster (no), Tetanus 2023  COVID vaccination: completed x 2  Depression screening: PHQ2 score = 0     Annual Wellness visit approx. Month: March ROS  11-point review of systems done. Negative except for detailed in the HPI.        Objective:      BP (!) 158/83   Pulse 80   Ht 5' (1.524 m)   Wt 62 kg (136 lb 11 oz)   BMI 26.69 kg/m²     Physical Exam     Vitals and nursing note reviewed.   Constitutional:       Appearance: Normal appearance.   HENT:      Head: Normocephalic and atraumatic.      Right Ear: Tympanic membrane normal.      Left Ear: Tympanic membrane normal.      Nose: Nose normal.      Mouth/Throat:      Mouth: Mucous membranes are moist.      Pharynx: Oropharynx is clear.   Eyes:      Extraocular Movements: Extraocular movements intact.      Conjunctiva/sclera: Conjunctivae normal.      Pupils: Pupils are equal, round, and reactive to light.   Cardiovascular:      Rate and Rhythm: Normal rate and regular rhythm.      Pulses: Normal pulses.      Heart sounds: Normal heart sounds.   Pulmonary:      Effort: Pulmonary effort is normal.      Breath sounds: Normal breath sounds.   Abdominal:      General: Bowel sounds are normal. There is no distension.      Palpations: Abdomen is soft.      Tenderness: There is no abdominal tenderness.   Musculoskeletal:         General: Normal range of motion.      Cervical back: Normal range of motion and neck supple.   BLL edema, more prominent around the ankles  Skin:     General: Skin is warm. Mobile mass 2 cm x 2 cm found at the anterior part / medial of the left elbow.  Neurological:      General: No focal deficit present.      Mental Status: She is alert and oriented to  person, place, and time. Mental status is at baseline.   Psychiatric:         Mood and Affect: Mood normal.           Assessment:       1. Primary hypertension  Assessment & Plan:  Uncontrolled.  Elevated SBP  Patient is having side effects due to amlodipine (ankle and feet edema)  Will DC amlodipine-benazepril  DC HCTZ    Will start patient on telmisartan 40 + HCTZ 12.5    Continue DASH diet    Orders:  -     Discontinue: telmisartan-hydrochlorothiazide (MICARDIS HCT) 80-25 mg per tablet; Take 1 tablet by mouth once daily.  Dispense: 90 tablet; Refill: 3  -     telmisartan-hydrochlorothiazide (MICARDIS HCT) 40-12.5 mg per tablet; Take 1 tablet by mouth once daily.  Dispense: 90 tablet; Refill: 3    2. Mild major depression, single episode  Assessment & Plan:  Stable  Recommended to take medications as prescribed      3. Atherosclerosis of aorta  Overview:  Ct abd 2022    Assessment & Plan:  On statin, will continue      4. Asymptomatic menopausal state  -     DXA Bone Density Axial Skeleton 1 or more sites; Future; Expected date: 09/05/2023    5. Inconclusive mammogram  -     Mammo Digital Diagnostic Bilat; Future; Expected date: 09/05/2023    6. Lipoma of skin  Assessment & Plan:  2cm x 2 cm found left upper extremity, anterior part of the elbow.      Other orders  -     Discontinue: telmisartan-hydrochlorothiazide (MICARDIS HCT) 40-12.5 mg per tablet; Take 1 tablet by mouth once daily.  Dispense: 90 tablet; Refill: 3       Plan:       DC amlodipine + benazepril  DC HCTZ  Started patient on telmisartan 40 + HCTZ 12.5  Resume sertraline, take as prescribed.    Education provided  Lifestyle recommendations given  AVS printed, explained, and given to the patient.  RTC in : 2 weeks for BP f/u            OLGA PHILLIPS MD, MPH  Internal Medicine  [facility]  [facility]

## 2024-03-21 NOTE — HPI-OTHER HISTORIES
29yoF w/ hx of UC (pancolitis diagnosed 12/2018), failed to respond to MTX, 5ASA, Humira, Entyvio and  s/p total colectomy and ileoanal J pouch (12/2019) and ileostomy reversal (1/2020)  now presents for f/u of pouchitis/Crohn's, refractory to multiple courses of ABX as well as advanced therapy. She has had multiple admissions for IBD flares over the years despite stelara and skyrizi.  EGD 8/25/2020 WNL  Colonoscopy in Sept 2020 revealed active enteritis on ileum biopsy Flex sig 8/2020: + pouch with congestion, edema, friability, edema + low rectal tone, neg TI; bx with chronic active inflammation of pouch and chronic active enteritis and ulceration of TI  Pouchoscopy 3/2021 moderate pouchitis and mild erythema of desirae-TI, bx of TI chronic active ileitis with apthous ulcer c/w crohns and pouch w chronic active ileitis Pouchoscopy 3/11/2022 mild inflammation of pouch with normal desirae-TI, bx  w/ chronic active pouchitis Pouchoscopy 5/12/2023 showed moderate pouchitis but normal desirae-TI, bx + active inflammation/ulceration  She was initiated on Rinvoq 45 at [facility] in Aug and advised to receive Shingrex (not done as pharmacy wouldnt give 2' age). She had a lapse in Rinvoq for 1 month from mid Oct-mid Nov 2' insurance and she presented to the ED on 11/15/23 for abdominal pain, nausea, and diarrhea.  CT scan revealed distended residual rectum, no evidence of obstruction. Prior CT scan on 10/10/23 revealed colectomy with J pouch, distal small bowel with mild wall thickening to the level of anastomosis suggesting mild uncomplicated enteritis.  C diff resulted + s/p dificid with improvement in sx and restarted rinvoq 45 in November, now on 30mg. She was doing very well but now notes she went back to ER in Jan for sx and had neg CT and again last week and told had UTI and possible inflammation. Given steroids and ABX without improvement in sx.   Notes dizziness, N/V fatigue and abd pain. Some increase in heartburn, new for her and early satiety. FC now c/w remission on rinvoq. Couldnt afford venofer, injectafer denied  Notes painful skin changes on skins--?EN. has not seen derm

## 2024-03-22 ENCOUNTER — LAB (OUTPATIENT)
Dept: URBAN - METROPOLITAN AREA MEDICAL CENTER 12 | Facility: MEDICAL CENTER | Age: 29
End: 2024-03-22

## 2024-03-22 PROCEDURE — 43235 EGD DIAGNOSTIC BRUSH WASH: CPT | Performed by: INTERNAL MEDICINE

## 2024-03-27 ENCOUNTER — EGD (OUTPATIENT)
Dept: URBAN - METROPOLITAN AREA SURGERY CENTER 16 | Facility: SURGERY CENTER | Age: 29
End: 2024-03-27

## 2024-03-29 ENCOUNTER — TELEP (OUTPATIENT)
Dept: URBAN - METROPOLITAN AREA TELEHEALTH 2 | Facility: TELEHEALTH | Age: 29
End: 2024-03-29

## 2024-04-22 ENCOUNTER — TELEP (OUTPATIENT)
Dept: URBAN - METROPOLITAN AREA TELEHEALTH 2 | Facility: TELEHEALTH | Age: 29
End: 2024-04-22
Payer: COMMERCIAL

## 2024-04-22 VITALS — WEIGHT: 108 LBS | HEIGHT: 62 IN | BODY MASS INDEX: 19.88 KG/M2

## 2024-04-22 DIAGNOSIS — R12 HEARTBURN: ICD-10-CM

## 2024-04-22 DIAGNOSIS — R11.2 NAUSEA AND VOMITING, UNSPECIFIED VOMITING TYPE: ICD-10-CM

## 2024-04-22 DIAGNOSIS — A04.72 CLOSTRIDIUM DIFFICILE COLITIS: ICD-10-CM

## 2024-04-22 DIAGNOSIS — R10.84 GENERALIZED ABDOMINAL PAIN: ICD-10-CM

## 2024-04-22 DIAGNOSIS — D50.0 IRON DEFICIENCY ANEMIA DUE TO CHRONIC BLOOD LOSS: ICD-10-CM

## 2024-04-22 DIAGNOSIS — Z71.85 VACCINE COUNSELING: ICD-10-CM

## 2024-04-22 DIAGNOSIS — M19.90 ARTHRITIS: ICD-10-CM

## 2024-04-22 DIAGNOSIS — E55.9 VITAMIN D DEFICIENCY: ICD-10-CM

## 2024-04-22 DIAGNOSIS — K50.80 CROHN'S DISEASE OF BOTH SMALL AND LARGE INTESTINE WITHOUT COMPLICATION: ICD-10-CM

## 2024-04-22 PROCEDURE — 99215 OFFICE O/P EST HI 40 MIN: CPT | Performed by: PHYSICIAN ASSISTANT

## 2024-04-22 RX ORDER — UPADACITINIB 45 MG/1
AS DIRECTED TABLET, EXTENDED RELEASE ORAL ONCE A DAY
Qty: 84 | Refills: 0 | Status: ACTIVE | COMMUNITY

## 2024-04-22 RX ORDER — LEVONORGESTREL/ETHINYL ESTRADIOL AND ETHINYL ESTRADIOL 100-20(84)
KIT ORAL
Qty: 91 TABLET | Status: ACTIVE | COMMUNITY

## 2024-04-22 RX ORDER — OXYCODONE HYDROCHLORIDE AND ACETAMINOPHEN 5; 325 MG/1; MG/1
TAKE 1 TABLET BY MOUTH EVERY 6 HOURS AS NEEDED FOR PAIN TABLET ORAL
Qty: 12 EACH | Refills: 0 | Status: ON HOLD | COMMUNITY

## 2024-04-22 RX ORDER — ZOSTER VACCINE RECOMBINANT, ADJUVANTED 50 MCG/0.5
AS DIRECTED KIT INTRAMUSCULAR
Qty: 0.5 ML | Refills: 1 | Status: ON HOLD | COMMUNITY

## 2024-04-22 RX ORDER — ZOSTER VACCINE RECOMBINANT, ADJUVANTED 50 MCG/0.5
AS DIRECTED KIT INTRAMUSCULAR
Qty: 0.5 ML | Refills: 1 | OUTPATIENT

## 2024-04-22 RX ORDER — OMEPRAZOLE 40 MG/1
1 CAPSULE 30 MINUTES BEFORE MORNING MEAL CAPSULE, DELAYED RELEASE ORAL ONCE A DAY
Qty: 90 | Refills: 3 | Status: ACTIVE | COMMUNITY
Start: 2024-03-21

## 2024-04-22 RX ORDER — OMEPRAZOLE 40 MG/1
1 CAPSULE 30 MINUTES BEFORE MORNING MEAL CAPSULE, DELAYED RELEASE ORAL ONCE A DAY
Qty: 90 | Refills: 3 | OUTPATIENT

## 2024-04-22 RX ORDER — PREDNISONE 10 MG/1
TAKE 4 TABLETS (40MG TOTAL) BY MOUTH IN THE MORNING FOR 5 DAYS TABLET ORAL
Qty: 20 EACH | Refills: 0 | Status: ON HOLD | COMMUNITY

## 2024-04-22 RX ORDER — FERRIC CARBOXYMALTOSE INJECTION 50 MG/ML
AS DIRECTED INJECTION, SOLUTION INTRAVENOUS
OUTPATIENT
Start: 2024-04-22 | End: 2024-05-06

## 2024-04-22 NOTE — HPI-OTHER HISTORIES
29yoF w/ hx of UC (pancolitis diagnosed 12/2018), failed to respond to MTX, 5ASA, Humira, Entyvio and  s/p total colectomy and ileoanal J pouch (12/2019) and ileostomy reversal (1/2020)  now presents for f/u of pouchitis/Crohn's, refractory to multiple courses of ABX as well as advanced therapy. She has had multiple admissions for IBD flares over the years despite stelara and skyrizi.  EGD 8/25/2020 WNL  Colonoscopy in Sept 2020 revealed active enteritis on ileum biopsy Flex sig 8/2020: + pouch with congestion, edema, friability, edema + low rectal tone, neg TI; bx with chronic active inflammation of pouch and chronic active enteritis and ulceration of TI  Pouchoscopy 3/2021 moderate pouchitis and mild erythema of desirae-TI, bx of TI chronic active ileitis with apthous ulcer c/w crohns and pouch w chronic active ileitis Pouchoscopy 3/11/2022 mild inflammation of pouch with normal desirae-TI, bx  w/ chronic active pouchitis Pouchoscopy 5/12/2023 showed moderate pouchitis but normal desirae-TI, bx + active inflammation/ulceration  She was initiated on Rinvoq 45 at OV in Aug and advised to receive Shingrex (not done as pharmacy wouldnt give 2' age). She had a lapse in Rinvoq for 1 month from mid Oct-mid Nov 2' insurance and she presented to the ED on 11/15/23 for abdominal pain, nausea, and diarrhea.  CT scan revealed distended residual rectum, no evidence of obstruction. Prior CT scan on 10/10/23 revealed colectomy with J pouch, distal small bowel with mild wall thickening to the level of anastomosis suggesting mild uncomplicated enteritis.  C diff resulted + s/p dificid with improvement in sx and restarted rinvoq 45 in November, doing very well but then in March had abd pain, N/V. ER at [facility]-CT:  No acute abdominopelvic abnormality. Specifically, no evidence of bowel obstruction. Unchanged postoperative changes of colectomy with ileoanal  anastomosis and patulous segment of bowel at the anastomosis. EGD was normal, bx neg. C Diff +, tx with dificid and feeling sign better-BMs more formed, still 6-7/day, non-bloody. No pain, N/V. No hematochezia.   LFTs WNL. Hg 7.6 (L) Hct 26.7 MCV 71 CRP 8.9 esr 130  FC now c/w remission on rinvoq. Couldnt afford venofer, injectafer denied.  Had some skin changes at last OV--?EN but now resolved.

## 2024-04-30 ENCOUNTER — APPOINTMENT (OUTPATIENT)
Dept: URBAN - METROPOLITAN AREA CLINIC 207 | Age: 29
Setting detail: DERMATOLOGY
End: 2024-05-01

## 2024-04-30 DIAGNOSIS — L57.8 OTHER SKIN CHANGES DUE TO CHRONIC EXPOSURE TO NONIONIZING RADIATION: ICD-10-CM

## 2024-04-30 DIAGNOSIS — Z71.89 OTHER SPECIFIED COUNSELING: ICD-10-CM

## 2024-04-30 DIAGNOSIS — L30.9 DERMATITIS, UNSPECIFIED: ICD-10-CM

## 2024-04-30 DIAGNOSIS — D22 MELANOCYTIC NEVI: ICD-10-CM

## 2024-04-30 DIAGNOSIS — L21.8 OTHER SEBORRHEIC DERMATITIS: ICD-10-CM

## 2024-04-30 PROBLEM — D22.5 MELANOCYTIC NEVI OF TRUNK: Status: ACTIVE | Noted: 2024-04-30

## 2024-04-30 PROCEDURE — OTHER MEDICATION COUNSELING: OTHER

## 2024-04-30 PROCEDURE — OTHER SUNSCREEN RECOMMENDATIONS: OTHER

## 2024-04-30 PROCEDURE — OTHER PRESCRIPTION: OTHER

## 2024-04-30 PROCEDURE — 99204 OFFICE O/P NEW MOD 45 MIN: CPT

## 2024-04-30 PROCEDURE — OTHER MIPS QUALITY: OTHER

## 2024-04-30 PROCEDURE — OTHER ADDITIONAL NOTES: OTHER

## 2024-04-30 PROCEDURE — OTHER COUNSELING: OTHER

## 2024-04-30 RX ORDER — KETOCONAZOLE 20 MG/ML
SHAMPOO, SUSPENSION TOPICAL
Qty: 120 | Refills: 3 | Status: ERX | COMMUNITY
Start: 2024-04-30

## 2024-04-30 ASSESSMENT — LOCATION ZONE DERM
LOCATION ZONE: TRUNK
LOCATION ZONE: SCALP

## 2024-04-30 ASSESSMENT — LOCATION SIMPLE DESCRIPTION DERM
LOCATION SIMPLE: LEFT UPPER BACK
LOCATION SIMPLE: ANTERIOR SCALP
LOCATION SIMPLE: LEFT BREAST

## 2024-04-30 ASSESSMENT — LOCATION DETAILED DESCRIPTION DERM
LOCATION DETAILED: LEFT INFRAMAMMARY CREASE (INNER QUADRANT)
LOCATION DETAILED: MID-FRONTAL SCALP
LOCATION DETAILED: LEFT MEDIAL UPPER BACK

## 2024-04-30 NOTE — PROCEDURE: ADDITIONAL NOTES
Detail Level: Simple
Additional Notes: Sticky note added to chart that pt can call if rash reappears and be scheduled for punch biopsy same or next day
Render Risk Assessment In Note?: no

## 2024-04-30 NOTE — HPI: SKIN CANCER EXAM
What Is The Reason For Today's Visit?: Skin Cancer Screening Exam
What Is The Reason For Today's Visit? (Being Monitored For X): concerning skin lesions on an annual basis
How Severe Are Your Spot(S)?: moderate
Additional History: Pt has Crohn’s disease and previously had rash on both shins GI doctor recommended skin check, rash is not present at today’s visit.

## 2024-04-30 NOTE — PROCEDURE: MEDICATION COUNSELING
Olanzapine Counseling- I discussed with the patient the common side effects of olanzapine including but are not limited to: lack of energy, dry mouth, increased appetite, sleepiness, tremor, constipation, dizziness, changes in behavior, or restlessness.  Explained that teenagers are more likely to experience headaches, abdominal pain, pain in the arms or legs, tiredness, and sleepiness.  Serious side effects include but are not limited: increased risk of death in elderly patients who are confused, have memory loss, or dementia-related psychosis; hyperglycemia; increased cholesterol and triglycerides; and weight gain.
Ilumya Counseling: I discussed with the patient the risks of tildrakizumab including but not limited to immunosuppression, malignancy, posterior leukoencephalopathy syndrome, and serious infections.  The patient understands that monitoring is required including a PPD at baseline and must alert us or the primary physician if symptoms of infection or other concerning signs are noted.
Cellcept Counseling:  I discussed with the patient the risks of mycophenolate mofetil including but not limited to infection/immunosuppression, GI upset, hypokalemia, hypercholesterolemia, bone marrow suppression, lymphoproliferative disorders, malignancy, GI ulceration/bleed/perforation, colitis, interstitial lung disease, kidney failure, progressive multifocal leukoencephalopathy, and birth defects.  The patient understands that monitoring is required including a baseline creatinine and regular CBC testing. In addition, patient must alert us immediately if symptoms of infection or other concerning signs are noted.
Azithromycin Counseling:  I discussed with the patient the risks of azithromycin including but not limited to GI upset, allergic reaction, drug rash, diarrhea, and yeast infections.
Drysol Counseling:  I discussed with the patient the risks of drysol/aluminum chloride including but not limited to skin rash, itching, irritation, burning.
Clindamycin Pregnancy And Lactation Text: This medication can be used in pregnancy if certain situations. Clindamycin is also present in breast milk.
Isotretinoin Pregnancy And Lactation Text: This medication is Pregnancy Category X and is considered extremely dangerous during pregnancy. It is unknown if it is excreted in breast milk.
Picato Pregnancy And Lactation Text: This medication is Pregnancy Category C. It is unknown if this medication is excreted in breast milk.
Winlevi Pregnancy And Lactation Text: This medication is considered safe during pregnancy and breastfeeding.
Azelaic Acid Pregnancy And Lactation Text: This medication is considered safe during pregnancy and breast feeding.
Stelara Counseling:  I discussed with the patient the risks of ustekinumab including but not limited to immunosuppression, malignancy, posterior leukoencephalopathy syndrome, and serious infections.  The patient understands that monitoring is required including a PPD at baseline and must alert us or the primary physician if symptoms of infection or other concerning signs are noted.
Ivermectin Counseling:  Patient instructed to take medication on an empty stomach with a full glass of water.  Patient informed of potential adverse effects including but not limited to nausea, diarrhea, dizziness, itching, and swelling of the extremities or lymph nodes.  The patient verbalized understanding of the proper use and possible adverse effects of ivermectin.  All of the patient's questions and concerns were addressed.
Soolantra Counseling: I discussed with the patients the risks of topial Soolantra. This is a medicine which decreases the number of mites and inflammation in the skin. You experience burning, stinging, eye irritation or allergic reactions.  Please call our office if you develop any problems from using this medication.
SSKI Counseling:  I discussed with the patient the risks of SSKI including but not limited to thyroid abnormalities, metallic taste, GI upset, fever, headache, acne, arthralgias, paraesthesias, lymphadenopathy, easy bleeding, arrhythmias, and allergic reaction.
Erivedge Pregnancy And Lactation Text: This medication is Pregnancy Category X and is absolutely contraindicated during pregnancy. It is unknown if it is excreted in breast milk.
Quinolones Pregnancy And Lactation Text: This medication is Pregnancy Category C and it isn't know if it is safe during pregnancy. It is also excreted in breast milk.
Cosentyx Pregnancy And Lactation Text: This medication is Pregnancy Category B and is considered safe during pregnancy. It is unknown if this medication is excreted in breast milk.
Glycopyrrolate Pregnancy And Lactation Text: This medication is Pregnancy Category B and is considered safe during pregnancy. It is unknown if it is excreted breast milk.
Doxycycline Counseling:  Patient counseled regarding possible photosensitivity and increased risk for sunburn.  Patient instructed to avoid sunlight, if possible.  When exposed to sunlight, patients should wear protective clothing, sunglasses, and sunscreen.  The patient was instructed to call the office immediately if the following severe adverse effects occur:  hearing changes, easy bruising/bleeding, severe headache, or vision changes.  The patient verbalized understanding of the proper use and possible adverse effects of doxycycline.  All of the patient's questions and concerns were addressed.
Ilumya Pregnancy And Lactation Text: The risk during pregnancy and breastfeeding is uncertain with this medication.
Sotyktu Pregnancy And Lactation Text: There is insufficient data to evaluate whether or not Sotyktu is safe to use during pregnancy.? ?It is not known if Sotyktu passes into breast milk and whether or not it is safe to use when breastfeeding.??
Griseofulvin Counseling:  I discussed with the patient the risks of griseofulvin including but not limited to photosensitivity, cytopenia, liver damage, nausea/vomiting and severe allergy.  The patient understands that this medication is best absorbed when taken with a fatty meal (e.g., ice cream or french fries).
Klisyri Counseling:  I discussed with the patient the risks of Klisyri including but not limited to erythema, scaling, itching, weeping, crusting, and pain.
Finasteride Female Counseling: Finasteride Counseling:  I discussed with the patient the risks of use of finasteride including but not limited to decreased libido and sexual dysfunction. Explained the teratogenic nature of the medication and stressed the importance of not getting pregnant during treatment. All of the patient's questions and concerns were addressed.
Azithromycin Pregnancy And Lactation Text: This medication is considered safe during pregnancy and is also secreted in breast milk.
Topical Metronidazole Counseling: Metronidazole is a topical antibiotic medication. You may experience burning, stinging, redness, or allergic reactions.  Please call our office if you develop any problems from using this medication.
Clofazimine Counseling:  I discussed with the patient the risks of clofazimine including but not limited to skin and eye pigmentation, liver damage, nausea/vomiting, gastrointestinal bleeding and allergy.
Cibinqo Counseling: I discussed with the patient the risks of Cibinqo therapy including but not limited to common cold, nausea, headache, cold sores, increased blood CPK levels, dizziness, UTIs, fatigue, acne, and vomitting. Live vaccines should be avoided.  This medication has been linked to serious infections; higher rate of mortality; malignancy and lymphoproliferative disorders; major adverse cardiovascular events; thrombosis; thrombocytopenia and lymphopenia; lipid elevations; and retinal detachment.
Zoryve Counseling:  I discussed with the patient that Zoryve is not for use in the eyes, mouth or vagina. The most commonly reported side effects include diarrhea, headache, insomnia, application site pain, upper respiratory tract infections, and urinary tract infections.  All of the patient's questions and concerns were addressed.
High Dose Vitamin A Counseling: Side effects reviewed, pt to contact office should one occur.
Ivermectin Pregnancy And Lactation Text: This medication is Pregnancy Category C and it isn't known if it is safe during pregnancy. It is also excreted in breast milk.
Cimetidine Counseling:  I discussed with the patient the risks of Cimetidine including but not limited to gynecomastia, headache, diarrhea, nausea, drowsiness, arrhythmias, pancreatitis, skin rashes, psychosis, bone marrow suppression and kidney toxicity.
Soolantra Pregnancy And Lactation Text: This medication is Pregnancy Category C. This medication is considered safe during breast feeding.
Olanzapine Pregnancy And Lactation Text: This medication is pregnancy category C.   There are no adequate and well controlled trials with olanzapine in pregnant females.  Olanzapine should be used during pregnancy only if the potential benefit justifies the potential risk to the fetus.   In a study in lactating healthy women, olanzapine was excreted in breast milk.  It is recommended that women taking olanzapine should not breast feed.
Cellcept Pregnancy And Lactation Text: This medication is Pregnancy Category D and isn't considered safe during pregnancy. It is unknown if this medication is excreted in breast milk.
Hydroxychloroquine Counseling:  I discussed with the patient that a baseline ophthalmologic exam is needed at the start of therapy and every year thereafter while on therapy. A CBC may also be warranted for monitoring.  The side effects of this medication were discussed with the patient, including but not limited to agranulocytosis, aplastic anemia, seizures, rashes, retinopathy, and liver toxicity. Patient instructed to call the office should any adverse effect occur.  The patient verbalized understanding of the proper use and possible adverse effects of Plaquenil.  All the patient's questions and concerns were addressed.
VTAMA Counseling: I discussed with the patient that VTAMA is not for use in the eyes, mouth or mouth. They should call the office if they develop any signs of allergic reactions to VTAMA. The patient verbalized understanding of the proper use and possible adverse effects of VTAMA.  All of the patient's questions and concerns were addressed.
Benzoyl Peroxide Counseling: Patient counseled that medicine may cause skin irritation and bleach clothing.  In the event of skin irritation, the patient was advised to reduce the amount of the drug applied or use it less frequently.   The patient verbalized understanding of the proper use and possible adverse effects of benzoyl peroxide.  All of the patient's questions and concerns were addressed.
Dupixent Counseling: I discussed with the patient the risks of dupilumab including but not limited to eye infection and irritation, cold sores, injection site reactions, worsening of asthma, allergic reactions and increased risk of parasitic infection.  Live vaccines should be avoided while taking dupilumab. Dupilumab will also interact with certain medications such as warfarin and cyclosporine. The patient understands that monitoring is required and they must alert us or the primary physician if symptoms of infection or other concerning signs are noted.
Protopic Counseling: Patient may experience a mild burning sensation during topical application. Protopic is not approved in children less than 2 years of age. There have been case reports of hematologic and skin malignancies in patients using topical calcineurin inhibitors although causality is questionable.
Libtayo Counseling- I discussed with the patient the risks of Libtayo including but not limited to nausea, vomiting, diarrhea, and bone or muscle pain.  The patient verbalized understanding of the proper use and possible adverse effects of Libtayo.  All of the patient's questions and concerns were addressed.
Griseofulvin Pregnancy And Lactation Text: This medication is Pregnancy Category X and is known to cause serious birth defects. It is unknown if this medication is excreted in breast milk but breast feeding should be avoided.
Doxycycline Pregnancy And Lactation Text: This medication is Pregnancy Category D and not consider safe during pregnancy. It is also excreted in breast milk but is considered safe for shorter treatment courses.
Sski Pregnancy And Lactation Text: This medication is Pregnancy Category D and isn't considered safe during pregnancy. It is excreted in breast milk.
Topical Metronidazole Pregnancy And Lactation Text: This medication is Pregnancy Category B and considered safe during pregnancy.  It is also considered safe to use while breastfeeding.
Klisyri Pregnancy And Lactation Text: It is unknown if this medication can harm a developing fetus or if it is excreted in breast milk.
Rifampin Counseling: I discussed with the patient the risks of rifampin including but not limited to liver damage, kidney damage, red-orange body fluids, nausea/vomiting and severe allergy.
Oral Minoxidil Counseling- I discussed with the patient the risks of oral minoxidil including but not limited to shortness of breath, swelling of the feet or ankles, dizziness, lightheadedness, unwanted hair growth and allergic reaction.  The patient verbalized understanding of the proper use and possible adverse effects of oral minoxidil.  All of the patient's questions and concerns were addressed.
Cyclophosphamide Counseling:  I discussed with the patient the risks of cyclophosphamide including but not limited to hair loss, hormonal abnormalities, decreased fertility, abdominal pain, diarrhea, nausea and vomiting, bone marrow suppression and infection. The patient understands that monitoring is required while taking this medication.
Cibinqo Pregnancy And Lactation Text: It is unknown if this medication will adversely affect pregnancy or breast feeding.  You should not take this medication if you are currently pregnant or planning a pregnancy or while breastfeeding.
Elidel Counseling: Patient may experience a mild burning sensation during topical application. Elidel is not approved in children less than 2 years of age. There have been case reports of hematologic and skin malignancies in patients using topical calcineurin inhibitors although causality is questionable.
Topical Retinoid counseling:  Patient advised to apply a pea-sized amount only at bedtime and wait 30 minutes after washing their face before applying.  If too drying, patient may add a non-comedogenic moisturizer. The patient verbalized understanding of the proper use and possible adverse effects of retinoids.  All of the patient's questions and concerns were addressed.
Clofazimine Pregnancy And Lactation Text: This medication is Pregnancy Category C and isn't considered safe during pregnancy. It is excreted in breast milk.
Infliximab Counseling:  I discussed with the patient the risks of infliximab including but not limited to myelosuppression, immunosuppression, autoimmune hepatitis, demyelinating diseases, lymphoma, and serious infections.  The patient understands that monitoring is required including a PPD at baseline and must alert us or the primary physician if symptoms of infection or other concerning signs are noted.
Xeljanz Counseling: I discussed with the patient the risks of Xeljanz therapy including increased risk of infection, liver issues, headache, diarrhea, or cold symptoms. Live vaccines should be avoided. They were instructed to call if they have any problems.
Finasteride Pregnancy And Lactation Text: This medication is absolutely contraindicated during pregnancy. It is unknown if it is excreted in breast milk.
Zoryve Pregnancy And Lactation Text: It is unknown if this medication can cause problems during pregnancy and breastfeeding.
Cimetidine Pregnancy And Lactation Text: This medication is Pregnancy Category B and is considered safe during pregnancy. It is also excreted in breast milk and breast feeding isn't recommended.
Bactrim Counseling:  I discussed with the patient the risks of sulfa antibiotics including but not limited to GI upset, allergic reaction, drug rash, diarrhea, dizziness, photosensitivity, and yeast infections.  Rarely, more serious reactions can occur including but not limited to aplastic anemia, agranulocytosis, methemoglobinemia, blood dyscrasias, liver or kidney failure, lung infiltrates or desquamative/blistering drug rashes.
Dupixent Pregnancy And Lactation Text: This medication likely crosses the placenta but the risk for the fetus is uncertain. This medication is excreted in breast milk.
Hydroxychloroquine Pregnancy And Lactation Text: This medication has been shown to cause fetal harm but it isn't assigned a Pregnancy Risk Category. There are small amounts excreted in breast milk.
Benzoyl Peroxide Pregnancy And Lactation Text: This medication is Pregnancy Category C. It is unknown if benzoyl peroxide is excreted in breast milk.
Taltz Counseling: I discussed with the patient the risks of ixekizumab including but not limited to immunosuppression, serious infections, worsening of inflammatory bowel disease and drug reactions.  The patient understands that monitoring is required including a PPD at baseline and must alert us or the primary physician if symptoms of infection or other concerning signs are noted.
Protopic Pregnancy And Lactation Text: This medication is Pregnancy Category C. It is unknown if this medication is excreted in breast milk when applied topically.
Rifampin Pregnancy And Lactation Text: This medication is Pregnancy Category C and it isn't know if it is safe during pregnancy. It is also excreted in breast milk and should not be used if you are breast feeding.
Itraconazole Counseling:  I discussed with the patient the risks of itraconazole including but not limited to liver damage, nausea/vomiting, neuropathy, and severe allergy.  The patient understands that this medication is best absorbed when taken with acidic beverages such as non-diet cola or ginger ale.  The patient understands that monitoring is required including baseline LFTs and repeat LFTs at intervals.  The patient understands that they are to contact us or the primary physician if concerning signs are noted.
Topical Steroids Counseling: I discussed with the patient that prolonged use of topical steroids can result in the increased appearance of superficial blood vessels (telangiectasias), lightening (hypopigmentation) and thinning of the skin (atrophy).  Patient understands to avoid using high potency steroids in skin folds, the groin or the face.  The patient verbalized understanding of the proper use and possible adverse effects of topical steroids.  All of the patient's questions and concerns were addressed.
Minoxidil Counseling: Minoxidil is a topical medication which can increase blood flow where it is applied. It is uncertain how this medication increases hair growth. Side effects are uncommon and include stinging and allergic reactions.
Libtayo Pregnancy And Lactation Text: This medication is contraindicated in pregnancy and when breast feeding.
Doxepin Counseling:  Patient advised that the medication is sedating and not to drive a car after taking this medication. Patient informed of potential adverse effects including but not limited to dry mouth, urinary retention, and blurry vision.  The patient verbalized understanding of the proper use and possible adverse effects of doxepin.  All of the patient's questions and concerns were addressed.
Xelkirillz Pregnancy And Lactation Text: This medication is Pregnancy Category D and is not considered safe during pregnancy.  The risk during breast feeding is also uncertain.
Birth Control Pills Counseling: Birth Control Pill Counseling: I discussed with the patient the potential side effects of OCPs including but not limited to increased risk of stroke, heart attack, thrombophlebitis, deep venous thrombosis, hepatic adenomas, breast changes, GI upset, headaches, and depression.  The patient verbalized understanding of the proper use and possible adverse effects of OCPs. All of the patient's questions and concerns were addressed.
Colchicine Counseling:  Patient counseled regarding adverse effects including but not limited to stomach upset (nausea, vomiting, stomach pain, or diarrhea).  Patient instructed to limit alcohol consumption while taking this medication.  Colchicine may reduce blood counts especially with prolonged use.  The patient understands that monitoring of kidney function and blood counts may be required, especially at baseline. The patient verbalized understanding of the proper use and possible adverse effects of colchicine.  All of the patient's questions and concerns were addressed.
Bactrim Pregnancy And Lactation Text: This medication is Pregnancy Category D and is known to cause fetal risk.  It is also excreted in breast milk.
Oral Minoxidil Pregnancy And Lactation Text: This medication should only be used when clearly needed if you are pregnant, attempting to become pregnant or breast feeding.
Cyclophosphamide Pregnancy And Lactation Text: This medication is Pregnancy Category D and it isn't considered safe during pregnancy. This medication is excreted in breast milk.
Zyclara Counseling:  I discussed with the patient the risks of imiquimod including but not limited to erythema, scaling, itching, weeping, crusting, and pain.  Patient understands that the inflammatory response to imiquimod is variable from person to person and was educated regarded proper titration schedule.  If flu-like symptoms develop, patient knows to discontinue the medication and contact us.
Thalidomide Counseling: I discussed with the patient the risks of thalidomide including but not limited to birth defects, anxiety, weakness, chest pain, dizziness, cough and severe allergy.
Adbry Counseling: I discussed with the patient the risks of tralokinumab including but not limited to eye infection and irritation, cold sores, injection site reactions, worsening of asthma, allergic reactions and increased risk of parasitic infection.  Live vaccines should be avoided while taking tralokinumab. The patient understands that monitoring is required and they must alert us or the primary physician if symptoms of infection or other concerning signs are noted.
Enbrel Counseling:  I discussed with the patient the risks of etanercept including but not limited to myelosuppression, immunosuppression, autoimmune hepatitis, demyelinating diseases, lymphoma, and infections.  The patient understands that monitoring is required including a PPD at baseline and must alert us or the primary physician if symptoms of infection or other concerning signs are noted.
Carac Counseling:  I discussed with the patient the risks of Carac including but not limited to erythema, scaling, itching, weeping, crusting, and pain.
Litfulo Counseling: I discussed with the patient the risks of Litfulo therapy including but not limited to upper respiratory tract infections, shingles, cold sores, and nausea. Live vaccines should be avoided.  This medication has been linked to serious infections; higher rate of mortality; malignancy and lymphoproliferative disorders; major adverse cardiovascular events; thrombosis; gastrointestinal perforations; neutropenia; lymphopenia; anemia; liver enzyme elevations; and lipid elevations.
Erythromycin Counseling:  I discussed with the patient the risks of erythromycin including but not limited to GI upset, allergic reaction, drug rash, diarrhea, increase in liver enzymes, and yeast infections.
Sarecycline Counseling: Patient advised regarding possible photosensitivity and discoloration of the teeth, skin, lips, tongue and gums.  Patient instructed to avoid sunlight, if possible.  When exposed to sunlight, patients should wear protective clothing, sunglasses, and sunscreen.  The patient was instructed to call the office immediately if the following severe adverse effects occur:  hearing changes, easy bruising/bleeding, severe headache, or vision changes.  The patient verbalized understanding of the proper use and possible adverse effects of sarecycline.  All of the patient's questions and concerns were addressed.
Odomzo Counseling- I discussed with the patient the risks of Odomzo including but not limited to nausea, vomiting, diarrhea, constipation, weight loss, changes in the sense of taste, decreased appetite, muscle spasms, and hair loss.  The patient verbalized understanding of the proper use and possible adverse effects of Odomzo.  All of the patient's questions and concerns were addressed.
Low Dose Naltrexone Counseling- I discussed with the patient the potential risks and side effects of low dose naltrexone including but not limited to: more vivid dreams, headaches, nausea, vomiting, abdominal pain, fatigue, dizziness, and anxiety.
Qbrexza Counseling:  I discussed with the patient the risks of Qbrexza including but not limited to headache, mydriasis, blurred vision, dry eyes, nasal dryness, dry mouth, dry throat, dry skin, urinary hesitation, and constipation.  Local skin reactions including erythema, burning, stinging, and itching can also occur.
Siliq Counseling:  I discussed with the patient the risks of Siliq including but not limited to new or worsening depression, suicidal thoughts and behavior, immunosuppression, malignancy, posterior leukoencephalopathy syndrome, and serious infections.  The patient understands that monitoring is required including a PPD at baseline and must alert us or the primary physician if symptoms of infection or other concerning signs are noted. There is also a special program designed to monitor depression which is required with Siliq.
Birth Control Pills Pregnancy And Lactation Text: This medication should be avoided if pregnant and for the first 30 days post-partum.
Topical Steroids Applications Pregnancy And Lactation Text: Most topical steroids are considered safe to use during pregnancy and lactation.  Any topical steroid applied to the breast or nipple should be washed off before breastfeeding.
Adbry Pregnancy And Lactation Text: It is unknown if this medication will adversely affect pregnancy or breast feeding.
Minoxidil Pregnancy And Lactation Text: This medication has not been assigned a Pregnancy Risk Category but animal studies failed to show danger with the topical medication. It is unknown if the medication is excreted in breast milk.
Otezla Counseling: The side effects of Otezla were discussed with the patient, including but not limited to worsening or new depression, weight loss, diarrhea, nausea, upper respiratory tract infection, and headache. Patient instructed to call the office should any adverse effect occur.  The patient verbalized understanding of the proper use and possible adverse effects of Otezla.  All the patient's questions and concerns were addressed.
Rituxan Counseling:  I discussed with the patient the risks of Rituxan infusions. Side effects can include infusion reactions, severe drug rashes including mucocutaneous reactions, reactivation of latent hepatitis and other infections and rarely progressive multifocal leukoencephalopathy.  All of the patient's questions and concerns were addressed.
Tazorac Counseling:  Patient advised that medication is irritating and drying.  Patient may need to apply sparingly and wash off after an hour before eventually leaving it on overnight.  The patient verbalized understanding of the proper use and possible adverse effects of tazorac.  All of the patient's questions and concerns were addressed.
Doxepin Pregnancy And Lactation Text: This medication is Pregnancy Category C and it isn't known if it is safe during pregnancy. It is also excreted in breast milk and breast feeding isn't recommended.
Cyclosporine Counseling:  I discussed with the patient the risks of cyclosporine including but not limited to hypertension, gingival hyperplasia,myelosuppression, immunosuppression, liver damage, kidney damage, neurotoxicity, lymphoma, and serious infections. The patient understands that monitoring is required including baseline blood pressure, CBC, CMP, lipid panel and uric acid, and then 1-2 times monthly CMP and blood pressure.
Eucrisa Counseling: Patient may experience a mild burning sensation during topical application. Eucrisa is not approved in children less than 2 years of age.
Erythromycin Pregnancy And Lactation Text: This medication is Pregnancy Category B and is considered safe during pregnancy. It is also excreted in breast milk.
Low Dose Naltrexone Pregnancy And Lactation Text: Naltrexone is pregnancy category C.  There have been no adequate and well-controlled studies in pregnant women.  It should be used in pregnancy only if the potential benefit justifies the potential risk to the fetus.   Limited data indicates that naltrexone is minimally excreted into breastmilk.
Tremfya Counseling: I discussed with the patient the risks of guselkumab including but not limited to immunosuppression, serious infections, and drug reactions.  The patient understands that monitoring is required including a PPD at baseline and must alert us or the primary physician if symptoms of infection or other concerning signs are noted.
Qbrexza Pregnancy And Lactation Text: There is no available data on Qbrexza use in pregnant women.  There is no available data on Qbrexza use in lactation.
Litfulo Pregnancy And Lactation Text: Based on animal studies, Lifulo may cause embryo-fetal harm when administered to pregnant women.  The medication should not be used in pregnancy.  Breastfeeding is not recommended during treatment.
Carac Pregnancy And Lactation Text: This medication is Pregnancy Category X and contraindicated in pregnancy and in women who may become pregnant. It is unknown if this medication is excreted in breast milk.
Acitretin Counseling:  I discussed with the patient the risks of acitretin including but not limited to hair loss, dry lips/skin/eyes, liver damage, hyperlipidemia, depression/suicidal ideation, photosensitivity.  Serious rare side effects can include but are not limited to pancreatitis, pseudotumor cerebri, bony changes, clot formation/stroke/heart attack.  Patient understands that alcohol is contraindicated since it can result in liver toxicity and significantly prolong the elimination of the drug by many years.
High Dose Vitamin A Pregnancy And Lactation Text: High dose vitamin A therapy is contraindicated during pregnancy and breast feeding.
Tranexamic Acid Counseling:  Patient advised of the small risk of bleeding problems with tranexamic acid. They were also instructed to call if they developed any nausea, vomiting or diarrhea. All of the patient's questions and concerns were addressed.
Bimzelx Counseling:  I discussed with the patient the risks of Bimzelx including but not limited to depression, immunosuppression, allergic reactions and infections.  The patient understands that monitoring is required including a PPD at baseline and must alert us or the primary physician if symptoms of infection or other concerning signs are noted.
Dapsone Counseling: I discussed with the patient the risks of dapsone including but not limited to hemolytic anemia, agranulocytosis, rashes, methemoglobinemia, kidney failure, peripheral neuropathy, headaches, GI upset, and liver toxicity.  Patients who start dapsone require monitoring including baseline LFTs and weekly CBCs for the first month, then every month thereafter.  The patient verbalized understanding of the proper use and possible adverse effects of dapsone.  All of the patient's questions and concerns were addressed.
Sarecycline Pregnancy And Lactation Text: This medication is Pregnancy Category D and not consider safe during pregnancy. It is also excreted in breast milk.
Ketoconazole Counseling:   Patient counseled regarding improving absorption with orange juice.  Adverse effects include but are not limited to breast enlargement, headache, diarrhea, nausea, upset stomach, liver function test abnormalities, taste disturbance, and stomach pain.  There is a rare possibility of liver failure that can occur when taking ketoconazole. The patient understands that monitoring of LFTs may be required, especially at baseline. The patient verbalized understanding of the proper use and possible adverse effects of ketoconazole.  All of the patient's questions and concerns were addressed.
Metronidazole Counseling:  I discussed with the patient the risks of metronidazole including but not limited to seizures, nausea/vomiting, a metallic taste in the mouth, nausea/vomiting and severe allergy.
Rituxan Pregnancy And Lactation Text: This medication is Pregnancy Category C and it isn't know if it is safe during pregnancy. It is unknown if this medication is excreted in breast milk but similar antibodies are known to be excreted.
Tazorac Pregnancy And Lactation Text: This medication is not safe during pregnancy. It is unknown if this medication is excreted in breast milk.
Mirvaso Counseling: Mirvaso is a topical medication which can decrease superficial blood flow where applied. Side effects are uncommon and include stinging, redness and allergic reactions.
Spironolactone Counseling: Patient advised regarding risks of diarrhea, abdominal pain, hyperkalemia, birth defects (for female patients), liver toxicity and renal toxicity. The patient may need blood work to monitor liver and kidney function and potassium levels while on therapy. The patient verbalized understanding of the proper use and possible adverse effects of spironolactone.  All of the patient's questions and concerns were addressed.
Topical Sulfur Applications Counseling: Topical Sulfur Counseling: Patient counseled that this medication may cause skin irritation or allergic reactions.  In the event of skin irritation, the patient was advised to reduce the amount of the drug applied or use it less frequently.   The patient verbalized understanding of the proper use and possible adverse effects of topical sulfur application.  All of the patient's questions and concerns were addressed.
Hydroxyzine Counseling: Patient advised that the medication is sedating and not to drive a car after taking this medication.  Patient informed of potential adverse effects including but not limited to dry mouth, urinary retention, and blurry vision.  The patient verbalized understanding of the proper use and possible adverse effects of hydroxyzine.  All of the patient's questions and concerns were addressed.
Olumiant Counseling: I discussed with the patient the risks of Olumiant therapy including but not limited to upper respiratory tract infections, shingles, cold sores, and nausea. Live vaccines should be avoided.  This medication has been linked to serious infections; higher rate of mortality; malignancy and lymphoproliferative disorders; major adverse cardiovascular events; thrombosis; gastrointestinal perforations; neutropenia; lymphopenia; anemia; liver enzyme elevations; and lipid elevations.
Detail Level: Simple
Otezla Pregnancy And Lactation Text: This medication is Pregnancy Category C and it isn't known if it is safe during pregnancy. It is unknown if it is excreted in breast milk.
Cyclosporine Pregnancy And Lactation Text: This medication is Pregnancy Category C and it isn't know if it is safe during pregnancy. This medication is excreted in breast milk.
Acitretin Pregnancy And Lactation Text: This medication is Pregnancy Category X and should not be given to women who are pregnant or may become pregnant in the future. This medication is excreted in breast milk.
Tetracycline Counseling: Patient counseled regarding possible photosensitivity and increased risk for sunburn.  Patient instructed to avoid sunlight, if possible.  When exposed to sunlight, patients should wear protective clothing, sunglasses, and sunscreen.  The patient was instructed to call the office immediately if the following severe adverse effects occur:  hearing changes, easy bruising/bleeding, severe headache, or vision changes.  The patient verbalized understanding of the proper use and possible adverse effects of tetracycline.  All of the patient's questions and concerns were addressed. Patient understands to avoid pregnancy while on therapy due to potential birth defects.
Niacinamide Counseling: I recommended taking niacin or niacinamide, also know as vitamin B3, twice daily. Recent evidence suggests that taking vitamin B3 (500 mg twice daily) can reduce the risk of actinic keratoses and non-melanoma skin cancers. Side effects of vitamin B3 include flushing and headache.
Ketoconazole Pregnancy And Lactation Text: This medication is Pregnancy Category C and it isn't know if it is safe during pregnancy. It is also excreted in breast milk and breast feeding isn't recommended.
Simponi Counseling:  I discussed with the patient the risks of golimumab including but not limited to myelosuppression, immunosuppression, autoimmune hepatitis, demyelinating diseases, lymphoma, and serious infections.  The patient understands that monitoring is required including a PPD at baseline and must alert us or the primary physician if symptoms of infection or other concerning signs are noted.
Calcipotriene Counseling:  I discussed with the patient the risks of calcipotriene including but not limited to erythema, scaling, itching, and irritation.
Humira Counseling:  I discussed with the patient the risks of adalimumab including but not limited to myelosuppression, immunosuppression, autoimmune hepatitis, demyelinating diseases, lymphoma, and serious infections.  The patient understands that monitoring is required including a PPD at baseline and must alert us or the primary physician if symptoms of infection or other concerning signs are noted.
Rhofade Counseling: Rhofade is a topical medication which can decrease superficial blood flow where applied. Side effects are uncommon and include stinging, redness and allergic reactions.
Spironolactone Pregnancy And Lactation Text: This medication can cause feminization of the male fetus and should be avoided during pregnancy. The active metabolite is also found in breast milk.
Hydroxyzine Pregnancy And Lactation Text: This medication is not safe during pregnancy and should not be taken. It is also excreted in breast milk and breast feeding isn't recommended.
Topical Clindamycin Counseling: Patient counseled that this medication may cause skin irritation or allergic reactions.  In the event of skin irritation, the patient was advised to reduce the amount of the drug applied or use it less frequently.   The patient verbalized understanding of the proper use and possible adverse effects of clindamycin.  All of the patient's questions and concerns were addressed.
Dapsone Pregnancy And Lactation Text: This medication is Pregnancy Category C and is not considered safe during pregnancy or breast feeding.
Metronidazole Pregnancy And Lactation Text: This medication is Pregnancy Category B and considered safe during pregnancy.  It is also excreted in breast milk.
Calcipotriene Pregnancy And Lactation Text: The use of this medication during pregnancy or lactation is not recommended as there is insufficient data.
Opioid Counseling: I discussed with the patient the potential side effects of opioids including but not limited to addiction, altered mental status, and depression. I stressed avoiding alcohol, benzodiazepines, muscle relaxants and sleep aids unless specifically okayed by a physician. The patient verbalized understanding of the proper use and possible adverse effects of opioids. All of the patient's questions and concerns were addressed. They were instructed to flush the remaining pills down the toilet if they did not need them for pain.
Topical Sulfur Applications Pregnancy And Lactation Text: This medication is considered safe during pregnancy and breast feeding secondary to limited systemic absorption.
Tranexamic Acid Pregnancy And Lactation Text: It is unknown if this medication is safe during pregnancy or breast feeding.
Bimzelx Pregnancy And Lactation Text: This medication crosses the placenta and the safety is uncertain during pregnancy. It is unknown if this medication is present in breast milk.
Mirvaso Pregnancy And Lactation Text: This medication has not been assigned a Pregnancy Risk Category. It is unknown if the medication is excreted in breast milk.
Oxybutynin Counseling:  I discussed with the patient the risks of oxybutynin including but not limited to skin rash, drowsiness, dry mouth, difficulty urinating, and blurred vision.
Methotrexate Counseling:  Patient counseled regarding adverse effects of methotrexate including but not limited to nausea, vomiting, abnormalities in liver function tests. Patients may develop mouth sores, rash, diarrhea, and abnormalities in blood counts. The patient understands that monitoring is required including LFT's and blood counts.  There is a rare possibility of scarring of the liver and lung problems that can occur when taking methotrexate. Persistent nausea, loss of appetite, pale stools, dark urine, cough, and shortness of breath should be reported immediately. Patient advised to discontinue methotrexate treatment at least three months before attempting to become pregnant.  I discussed the need for folate supplements while taking methotrexate.  These supplements can decrease side effects during methotrexate treatment. The patient verbalized understanding of the proper use and possible adverse effects of methotrexate.  All of the patient's questions and concerns were addressed.
Olumiant Pregnancy And Lactation Text: Based on animal studies, Olumiant may cause embryo-fetal harm when administered to pregnant women.  The medication should not be used in pregnancy.  Breastfeeding is not recommended during treatment.
Dutasteride Male Counseling: Dustasteride Counseling:  I discussed with the patient the risks of use of dutasteride including but not limited to decreased libido, decreased ejaculate volume, and gynecomastia. Women who can become pregnant should not handle medication.  All of the patient's questions and concerns were addressed.
Hydroquinone Counseling:  Patient advised that medication may result in skin irritation, lightening (hypopigmentation), dryness, and burning.  In the event of skin irritation, the patient was advised to reduce the amount of the drug applied or use it less frequently.  Rarely, spots that are treated with hydroquinone can become darker (pseudoochronosis).  Should this occur, patient instructed to stop medication and call the office. The patient verbalized understanding of the proper use and possible adverse effects of hydroquinone.  All of the patient's questions and concerns were addressed.
Cantharidin Counseling:  I discussed with the patient the risks of Cantharidin including but not limited to pain, redness, burning, itching, and blistering.
Niacinamide Pregnancy And Lactation Text: These medications are considered safe during pregnancy.
Bexarotene Counseling:  I discussed with the patient the risks of bexarotene including but not limited to hair loss, dry lips/skin/eyes, liver abnormalities, hyperlipidemia, pancreatitis, depression/suicidal ideation, photosensitivity, drug rash/allergic reactions, hypothyroidism, anemia, leukopenia, infection, cataracts, and teratogenicity.  Patient understands that they will need regular blood tests to check lipid profile, liver function tests, white blood cell count, thyroid function tests and pregnancy test if applicable.
Include Pregnancy/Lactation Warning?: No
Xolair Counseling:  Patient informed of potential adverse effects including but not limited to fever, muscle aches, rash and allergic reactions.  The patient verbalized understanding of the proper use and possible adverse effects of Xolair.  All of the patient's questions and concerns were addressed.
Cephalexin Counseling: I counseled the patient regarding use of cephalexin as an antibiotic for prophylactic and/or therapeutic purposes. Cephalexin (commonly prescribed under brand name Keflex) is a cephalosporin antibiotic which is active against numerous classes of bacteria, including most skin bacteria. Side effects may include nausea, diarrhea, gastrointestinal upset, rash, hives, yeast infections, and in rare cases, hepatitis, kidney disease, seizures, fever, confusion, neurologic symptoms, and others. Patients with severe allergies to penicillin medications are cautioned that there is about a 10% incidence of cross-reactivity with cephalosporins. When possible, patients with penicillin allergies should use alternatives to cephalosporins for antibiotic therapy.
Opioid Pregnancy And Lactation Text: These medications can lead to premature delivery and should be avoided during pregnancy. These medications are also present in breast milk in small amounts.
Wartpeel Counseling:  I discussed with the patient the risks of Wartpeel including but not limited to erythema, scaling, itching, weeping, crusting, and pain.
Opzelura Counseling:  I discussed with the patient the risks of Opzelura including but not limited to nasopharngitis, bronchitis, ear infection, eosinophila, hives, diarrhea, folliculitis, tonsillitis, and rhinorrhea.  Taken orally, this medication has been linked to serious infections; higher rate of mortality; malignancy and lymphoproliferative disorders; major adverse cardiovascular events; thrombosis; thrombocytopenia, anemia, and neutropenia; and lipid elevations.
Aklief counseling:  Patient advised to apply a pea-sized amount only at bedtime and wait 30 minutes after washing their face before applying.  If too drying, patient may add a non-comedogenic moisturizer.  The most commonly reported side effects including irritation, redness, scaling, dryness, stinging, burning, itching, and increased risk of sunburn.  The patient verbalized understanding of the proper use and possible adverse effects of retinoids.  All of the patient's questions and concerns were addressed.
Cimzia Counseling:  I discussed with the patient the risks of Cimzia including but not limited to immunosuppression, allergic reactions and infections.  The patient understands that monitoring is required including a PPD at baseline and must alert us or the primary physician if symptoms of infection or other concerning signs are noted.
Terbinafine Counseling: Patient counseling regarding adverse effects of terbinafine including but not limited to headache, diarrhea, rash, upset stomach, liver function test abnormalities, itching, taste/smell disturbance, nausea, abdominal pain, and flatulence.  There is a rare possibility of liver failure that can occur when taking terbinafine.  The patient understands that a baseline LFT and kidney function test may be required. The patient verbalized understanding of the proper use and possible adverse effects of terbinafine.  All of the patient's questions and concerns were addressed.
Valtrex Counseling: I discussed with the patient the risks of valacyclovir including but not limited to kidney damage, nausea, vomiting and severe allergy.  The patient understands that if the infection seems to be worsening or is not improving, they are to call.
Gabapentin Counseling: I discussed with the patient the risks of gabapentin including but not limited to dizziness, somnolence, fatigue and ataxia.
Oxybutynin Pregnancy And Lactation Text: This medication is Pregnancy Category B and is considered safe during pregnancy. It is unknown if it is excreted in breast milk.
Methotrexate Pregnancy And Lactation Text: This medication is Pregnancy Category X and is known to cause fetal harm. This medication is excreted in breast milk.
Cantharidin Pregnancy And Lactation Text: This medication has not been proven safe during pregnancy. It is unknown if this medication is excreted in breast milk.
Minocycline Counseling: Patient advised regarding possible photosensitivity and discoloration of the teeth, skin, lips, tongue and gums.  Patient instructed to avoid sunlight, if possible.  When exposed to sunlight, patients should wear protective clothing, sunglasses, and sunscreen.  The patient was instructed to call the office immediately if the following severe adverse effects occur:  hearing changes, easy bruising/bleeding, severe headache, or vision changes.  The patient verbalized understanding of the proper use and possible adverse effects of minocycline.  All of the patient's questions and concerns were addressed.
Cephalexin Pregnancy And Lactation Text: This medication is Pregnancy Category B and considered safe during pregnancy.  It is also excreted in breast milk but can be used safely for shorter doses.
Solaraze Counseling:  I discussed with the patient the risks of Solaraze including but not limited to erythema, scaling, itching, weeping, crusting, and pain.
5-Fu Counseling: 5-Fluorouracil Counseling:  I discussed with the patient the risks of 5-fluorouracil including but not limited to erythema, scaling, itching, weeping, crusting, and pain.
Xolair Pregnancy And Lactation Text: This medication is Pregnancy Category B and is considered safe during pregnancy. This medication is excreted in breast milk.
Rinvoq Counseling: I discussed with the patient the risks of Rinvoq therapy including but not limited to upper respiratory tract infections, shingles, cold sores, bronchitis, nausea, cough, fever, acne, and headache. Live vaccines should be avoided.  This medication has been linked to serious infections; higher rate of mortality; malignancy and lymphoproliferative disorders; major adverse cardiovascular events; thrombosis; thrombocytopenia, anemia, and neutropenia; lipid elevations; liver enzyme elevations; and gastrointestinal perforations.
Dutasteride Female Counseling: Dutasteride Counseling:  I discussed with the patient the risks of use of dutasteride including but not limited to decreased libido and sexual dysfunction. Explained the teratogenic nature of the medication and stressed the importance of not getting pregnant during treatment. All of the patient's questions and concerns were addressed.
Valtrex Pregnancy And Lactation Text: this medication is Pregnancy Category B and is considered safe during pregnancy. This medication is not directly found in breast milk but it's metabolite acyclovir is present.
Cimzia Pregnancy And Lactation Text: This medication crosses the placenta but can be considered safe in certain situations. Cimzia may be excreted in breast milk.
Bexarotene Pregnancy And Lactation Text: This medication is Pregnancy Category X and should not be given to women who are pregnant or may become pregnant. This medication should not be used if you are breast feeding.
Nsaids Counseling: NSAID Counseling: I discussed with the patient that NSAIDs should be taken with food. Prolonged use of NSAIDs can result in the development of stomach ulcers.  Patient advised to stop taking NSAIDs if abdominal pain occurs.  The patient verbalized understanding of the proper use and possible adverse effects of NSAIDs.  All of the patient's questions and concerns were addressed.
Hyrimoz Counseling:  I discussed with the patient the risks of adalimumab including but not limited to myelosuppression, immunosuppression, autoimmune hepatitis, demyelinating diseases, lymphoma, and serious infections.  The patient understands that monitoring is required including a PPD at baseline and must alert us or the primary physician if symptoms of infection or other concerning signs are noted.
Aklief Pregnancy And Lactation Text: It is unknown if this medication is safe to use during pregnancy.  It is unknown if this medication is excreted in breast milk.  Breastfeeding women should use the topical cream on the smallest area of the skin for the shortest time needed while breastfeeding.  Do not apply to nipple and areola.
Albendazole Counseling:  I discussed with the patient the risks of albendazole including but not limited to cytopenia, kidney damage, nausea/vomiting and severe allergy.  The patient understands that this medication is being used in an off-label manner.
Azathioprine Counseling:  I discussed with the patient the risks of azathioprine including but not limited to myelosuppression, immunosuppression, hepatotoxicity, lymphoma, and infections.  The patient understands that monitoring is required including baseline LFTs, Creatinine, possible TPMP genotyping and weekly CBCs for the first month and then every 2 weeks thereafter.  The patient verbalized understanding of the proper use and possible adverse effects of azathioprine.  All of the patient's questions and concerns were addressed.
Skyrizi Counseling: I discussed with the patient the risks of risankizumab-rzaa including but not limited to immunosuppression, and serious infections.  The patient understands that monitoring is required including a PPD at baseline and must alert us or the primary physician if symptoms of infection or other concerning signs are noted.
Fluconazole Counseling:  Patient counseled regarding adverse effects of fluconazole including but not limited to headache, diarrhea, nausea, upset stomach, liver function test abnormalities, taste disturbance, and stomach pain.  There is a rare possibility of liver failure that can occur when taking fluconazole.  The patient understands that monitoring of LFTs and kidney function test may be required, especially at baseline. The patient verbalized understanding of the proper use and possible adverse effects of fluconazole.  All of the patient's questions and concerns were addressed.
Opzelura Pregnancy And Lactation Text: There is insufficient data to evaluate drug-associated risk for major birth defects, miscarriage, or other adverse maternal or fetal outcomes.  There is a pregnancy registry that monitors pregnancy outcomes in pregnant persons exposed to the medication during pregnancy.  It is unknown if this medication is excreted in breast milk.  Do not breastfeed during treatment and for about 4 weeks after the last dose.
Dutasteride Pregnancy And Lactation Text: This medication is absolutely contraindicated in women, especially during pregnancy and breast feeding. Feminization of male fetuses is possible if taking while pregnant.
Imiquimod Counseling:  I discussed with the patient the risks of imiquimod including but not limited to erythema, scaling, itching, weeping, crusting, and pain.  Patient understands that the inflammatory response to imiquimod is variable from person to person and was educated regarded proper titration schedule.  If flu-like symptoms develop, patient knows to discontinue the medication and contact us.
Arava Counseling:  Patient counseled regarding adverse effects of Arava including but not limited to nausea, vomiting, abnormalities in liver function tests. Patients may develop mouth sores, rash, diarrhea, and abnormalities in blood counts. The patient understands that monitoring is required including LFTs and blood counts.  There is a rare possibility of scarring of the liver and lung problems that can occur when taking methotrexate. Persistent nausea, loss of appetite, pale stools, dark urine, cough, and shortness of breath should be reported immediately. Patient advised to discontinue Arava treatment and consult with a physician prior to attempting conception. The patient will have to undergo a treatment to eliminate Arava from the body prior to conception.
Clindamycin Counseling: I counseled the patient regarding use of clindamycin as an antibiotic for prophylactic and/or therapeutic purposes. Clindamycin is active against numerous classes of bacteria, including skin bacteria. Side effects may include nausea, diarrhea, gastrointestinal upset, rash, hives, yeast infections, and in rare cases, colitis.
Propranolol Counseling:  I discussed with the patient the risks of propranolol including but not limited to low heart rate, low blood pressure, low blood sugar, restlessness and increased cold sensitivity. They should call the office if they experience any of these side effects.
Prednisone Counseling:  I discussed with the patient the risks of prolonged use of prednisone including but not limited to weight gain, insomnia, osteoporosis, mood changes, diabetes, susceptibility to infection, glaucoma and high blood pressure.  In cases where prednisone use is prolonged, patients should be monitored with blood pressure checks, serum glucose levels and an eye exam.  Additionally, the patient may need to be placed on GI prophylaxis, PCP prophylaxis, and calcium and vitamin D supplementation and/or a bisphosphonate.  The patient verbalized understanding of the proper use and the possible adverse effects of prednisone.  All of the patient's questions and concerns were addressed.
Topical Ketoconazole Counseling: Patient counseled that this medication may cause skin irritation or allergic reactions.  In the event of skin irritation, the patient was advised to reduce the amount of the drug applied or use it less frequently.   The patient verbalized understanding of the proper use and possible adverse effects of ketoconazole.  All of the patient's questions and concerns were addressed.
Rinvoq Pregnancy And Lactation Text: Based on animal studies, Rinvoq may cause embryo-fetal harm when administered to pregnant women.  The medication should not be used in pregnancy.  Breastfeeding is not recommended during treatment and for 6 days after the last dose.
Nsaids Pregnancy And Lactation Text: These medications are considered safe up to 30 weeks gestation. It is excreted in breast milk.
Isotretinoin Counseling: Patient should get monthly blood tests, not donate blood, not drive at night if vision affected, not share medication, and not undergo elective surgery for 6 months after tx completed. Side effects reviewed, pt to contact office should one occur.
Solaraze Pregnancy And Lactation Text: This medication is Pregnancy Category B and is considered safe. There is some data to suggest avoiding during the third trimester. It is unknown if this medication is excreted in breast milk.
Azelaic Acid Counseling: Patient counseled that medicine may cause skin irritation and to avoid applying near the eyes.  In the event of skin irritation, the patient was advised to reduce the amount of the drug applied or use it less frequently.   The patient verbalized understanding of the proper use and possible adverse effects of azelaic acid.  All of the patient's questions and concerns were addressed.
Quinolones Counseling:  I discussed with the patient the risks of fluoroquinolones including but not limited to GI upset, allergic reaction, drug rash, diarrhea, dizziness, photosensitivity, yeast infections, liver function test abnormalities, tendonitis/tendon rupture.
Cosentyx Counseling:  I discussed with the patient the risks of Cosentyx including but not limited to worsening of Crohn's disease, immunosuppression, allergic reactions and infections.  The patient understands that monitoring is required including a PPD at baseline and must alert us or the primary physician if symptoms of infection or other concerning signs are noted.
Glycopyrrolate Counseling:  I discussed with the patient the risks of glycopyrrolate including but not limited to skin rash, drowsiness, dry mouth, difficulty urinating, and blurred vision.
Winlevi Counseling:  I discussed with the patient the risks of topical clascoterone including but not limited to erythema, scaling, itching, and stinging. Patient voiced their understanding.
Picato Counseling:  I discussed with the patient the risks of Picato including but not limited to erythema, scaling, itching, weeping, crusting, and pain.
Sotyktu Counseling:  I discussed the most common side effects of Sotyktu including: common cold, sore throat, sinus infections, cold sores, canker sores, folliculitis, and acne.? I also discussed more serious side effects of Sotyktu including but not limited to: serious allergic reactions; increased risk for infections such as TB; cancers such as lymphomas; rhabdomyolysis and elevated CPK; and elevated triglycerides and liver enzymes.?
Finasteride Male Counseling: Finasteride Counseling:  I discussed with the patient the risks of use of finasteride including but not limited to decreased libido, decreased ejaculate volume, gynecomastia, and depression. Women should not handle medication.  All of the patient's questions and concerns were addressed.
Erivedge Counseling- I discussed with the patient the risks of Erivedge including but not limited to nausea, vomiting, diarrhea, constipation, weight loss, changes in the sense of taste, decreased appetite, muscle spasms, and hair loss.  The patient verbalized understanding of the proper use and possible adverse effects of Erivedge.  All of the patient's questions and concerns were addressed.
Propranolol Pregnancy And Lactation Text: This medication is Pregnancy Category C and it isn't known if it is safe during pregnancy. It is excreted in breast milk.

## 2024-05-28 ENCOUNTER — WEB ENCOUNTER (OUTPATIENT)
Dept: URBAN - METROPOLITAN AREA CLINIC 92 | Facility: CLINIC | Age: 29
End: 2024-05-28

## 2024-05-28 RX ORDER — UPADACITINIB 30 MG/1
1 TABLET TABLET, EXTENDED RELEASE ORAL ONCE A DAY
Qty: 90 TABLET | Refills: 1
Start: 2024-05-28 | End: 2024-11-24

## 2024-05-28 RX ORDER — UPADACITINIB 30 MG/1
1 TABLET TABLET, EXTENDED RELEASE ORAL ONCE A DAY
Qty: 90 TABLET | Refills: 1 | OUTPATIENT
Start: 2024-05-28 | End: 2024-11-23

## 2024-07-12 ENCOUNTER — TELEPHONE ENCOUNTER (OUTPATIENT)
Dept: URBAN - METROPOLITAN AREA CLINIC 92 | Facility: CLINIC | Age: 29
End: 2024-07-12

## 2024-07-12 RX ORDER — UPADACITINIB 30 MG/1
1 TABLET TABLET, EXTENDED RELEASE ORAL ONCE A DAY
Qty: 90 TABLET | Refills: 1
Start: 2024-05-28 | End: 2025-01-08

## 2024-07-22 ENCOUNTER — LAB OUTSIDE AN ENCOUNTER (OUTPATIENT)
Dept: URBAN - METROPOLITAN AREA TELEHEALTH 2 | Facility: TELEHEALTH | Age: 29
End: 2024-07-22

## 2024-07-23 LAB
A/G RATIO: 1.3
ABSOLUTE BASOPHILS: 41
ABSOLUTE EOSINOPHILS: 200
ABSOLUTE LYMPHOCYTES: 1835
ABSOLUTE MONOCYTES: 400
ABSOLUTE NEUTROPHILS: 4423
ALBUMIN: 4.2
ALKALINE PHOSPHATASE: 94
ALT (SGPT): 55
AST (SGOT): 35
BASOPHILS: 0.6
BILIRUBIN, TOTAL: 0.4
BUN/CREATININE RATIO: (no result)
BUN: 8
C-REACTIVE PROTEIN, QUANT: <3
CALCIUM: 9.2
CARBON DIOXIDE, TOTAL: 27
CHLORIDE: 105
CHOL/HDLC RATIO: 2.3
CHOLESTEROL, TOTAL: 137
CREATININE: 0.73
EGFR: 115
EOSINOPHILS: 2.9
FERRITIN, SERUM: 118
GLOBULIN, TOTAL: 3.2
GLUCOSE: 103
HDL CHOLESTEROL: 59
HEMATOCRIT: 38.1
HEMOGLOBIN: 12.1
IRON BIND.CAP.(TIBC): 309
IRON SATURATION: 25
IRON: 77
LDL CHOLESTEROL CALC: 62
LYMPHOCYTES: 26.6
MCH: 30
MCHC: 31.8
MCV: 94.5
MONOCYTES: 5.8
MPV: 11
NEUTROPHILS: 64.1
NON HDL CHOLESTEROL: 78
PLATELET COUNT: 358
POTASSIUM: 4
PROTEIN, TOTAL: 7.4
RDW: 13.8
RED BLOOD CELL COUNT: 4.03
SED RATE BY MODIFIED: 17
SODIUM: 140
TRIGLYCERIDES: 77
VITAMIN D,25-OH,TOTAL,IA: 31
WHITE BLOOD CELL COUNT: 6.9

## 2024-07-29 LAB — CALPROTECTIN, FECAL: <5

## 2024-07-31 ENCOUNTER — DASHBOARD ENCOUNTERS (OUTPATIENT)
Age: 29
End: 2024-07-31

## 2024-08-05 ENCOUNTER — TELEPHONE ENCOUNTER (OUTPATIENT)
Dept: URBAN - METROPOLITAN AREA CLINIC 92 | Facility: CLINIC | Age: 29
End: 2024-08-05

## 2024-08-05 ENCOUNTER — LAB OUTSIDE AN ENCOUNTER (OUTPATIENT)
Dept: URBAN - METROPOLITAN AREA CLINIC 124 | Facility: CLINIC | Age: 29
End: 2024-08-05

## 2024-08-05 ENCOUNTER — OFFICE VISIT (OUTPATIENT)
Dept: URBAN - METROPOLITAN AREA CLINIC 124 | Facility: CLINIC | Age: 29
End: 2024-08-05
Payer: COMMERCIAL

## 2024-08-05 VITALS
TEMPERATURE: 96.8 F | BODY MASS INDEX: 20.8 KG/M2 | HEIGHT: 62 IN | DIASTOLIC BLOOD PRESSURE: 75 MMHG | WEIGHT: 113 LBS | HEART RATE: 74 BPM | SYSTOLIC BLOOD PRESSURE: 111 MMHG

## 2024-08-05 DIAGNOSIS — R11.2 NAUSEA AND VOMITING, UNSPECIFIED VOMITING TYPE: ICD-10-CM

## 2024-08-05 DIAGNOSIS — R10.84 GENERALIZED ABDOMINAL PAIN: ICD-10-CM

## 2024-08-05 DIAGNOSIS — A04.72 CLOSTRIDIUM DIFFICILE COLITIS: ICD-10-CM

## 2024-08-05 DIAGNOSIS — K50.80 CROHN'S DISEASE OF BOTH SMALL AND LARGE INTESTINE WITHOUT COMPLICATION: ICD-10-CM

## 2024-08-05 PROCEDURE — 99214 OFFICE O/P EST MOD 30 MIN: CPT | Performed by: PHYSICIAN ASSISTANT

## 2024-08-05 RX ORDER — UPADACITINIB 30 MG/1
1 TABLET TABLET, EXTENDED RELEASE ORAL ONCE A DAY
Qty: 90 TABLET | Refills: 1 | Status: DISCONTINUED | COMMUNITY
Start: 2024-05-28 | End: 2025-01-08

## 2024-08-05 RX ORDER — PREDNISONE 10 MG/1
TAKE 4 TABLETS (40MG TOTAL) BY MOUTH IN THE MORNING FOR 5 DAYS TABLET ORAL
Qty: 20 EACH | Refills: 0 | Status: ON HOLD | COMMUNITY

## 2024-08-05 RX ORDER — ZOSTER VACCINE RECOMBINANT, ADJUVANTED 50 MCG/0.5
AS DIRECTED KIT INTRAMUSCULAR
Qty: 0.5 ML | Refills: 1 | Status: DISCONTINUED | COMMUNITY

## 2024-08-05 RX ORDER — OXYCODONE HYDROCHLORIDE AND ACETAMINOPHEN 5; 325 MG/1; MG/1
TAKE 1 TABLET BY MOUTH EVERY 6 HOURS AS NEEDED FOR PAIN TABLET ORAL
Qty: 12 EACH | Refills: 0 | Status: ON HOLD | COMMUNITY

## 2024-08-05 RX ORDER — OMEPRAZOLE 40 MG/1
1 CAPSULE 30 MINUTES BEFORE MORNING MEAL CAPSULE, DELAYED RELEASE ORAL ONCE A DAY
Qty: 90 | Refills: 3 | Status: DISCONTINUED | COMMUNITY

## 2024-08-05 RX ORDER — ZOSTER VACCINE RECOMBINANT, ADJUVANTED 50 MCG/0.5
AS DIRECTED KIT INTRAMUSCULAR
Qty: 0.5 ML | Refills: 1
End: 2024-10-05

## 2024-08-05 RX ORDER — ZOSTER VACCINE RECOMBINANT, ADJUVANTED 50 MCG/0.5
AS DIRECTED KIT INTRAMUSCULAR
Qty: 0.5 ML | Refills: 1 | OUTPATIENT

## 2024-08-05 RX ORDER — OMEPRAZOLE 40 MG/1
1 CAPSULE 30 MINUTES BEFORE MORNING MEAL CAPSULE, DELAYED RELEASE ORAL ONCE A DAY
Qty: 90 | Refills: 3 | OUTPATIENT

## 2024-08-05 RX ORDER — LEVONORGESTREL/ETHINYL ESTRADIOL AND ETHINYL ESTRADIOL 100-20(84)
KIT ORAL
Qty: 91 TABLET | Status: ACTIVE | COMMUNITY

## 2024-08-05 RX ORDER — UPADACITINIB 45 MG/1
AS DIRECTED TABLET, EXTENDED RELEASE ORAL ONCE A DAY
Qty: 84 | Refills: 0 | Status: ACTIVE | COMMUNITY

## 2024-08-05 NOTE — HPI-OTHER HISTORIES
29yoF w/ hx of UC (pancolitis diagnosed 12/2018), failed to respond to MTX, 5ASA, Humira, Entyvio and  s/p total colectomy and ileoanal J pouch (12/2019) and ileostomy reversal (1/2020)  now presents for f/u of pouchitis/Crohn's, refractory to multiple courses of ABX as well as advanced therapy. More recently she did not respond to skyrizi or stelara.  Pouchoscopy 3/2021 moderate pouchitis and mild erythema of desirae-TI, bx of TI chronic active ileitis with apthous ulcer c/w crohns and pouch w chronic active ileitis Pouchoscopy 3/11/2022 mild inflammation of pouch with normal desirae-TI, bx  w/ chronic active pouchitis Pouchoscopy 5/12/2023 showed moderate pouchitis but normal desirae-TI, bx + active inflammation/ulceration She was initiated on Rinvoq 45 in Aug 2023 and did well for months but had a lapse in meds from Oct-mid Nov 2' insurance and she presented to the ED in Nov 2023  and CT revealed distended residual rectum, no evidence of obstruction. Prior CT scan on 10/10/23 revealed colectomy with J pouch, distal small bowel with mild wall thickening to the level of anastomosis suggesting mild uncomplicated enteritis.  C diff resulted + s/p dific s/p dificid and did well from november to march and then had recurrent N/V/D. ER at [facility] and CT w/ No acute abdominopelvic abnormality. Unchanged postoperative changes of colectomy with ileoanal  anastomosis and patulous segment of bowel at the anastomosis. EGD was normal, bx neg. C Diff +, tx with dificid again and was back in clinical remission with 4-6 BMs/day, non-bloody and no pain and gained 5# and calpro as below. Now for the last 2 days notes increased BMs to 8-10/day and fatigue and nausea. No recent ABX. No fevers or chills.  Injectafer X 2 in May. Had some skin changes at last OV--?EN but now resolved and arthritis doing well

## 2024-08-06 ENCOUNTER — WEB ENCOUNTER (OUTPATIENT)
Dept: URBAN - METROPOLITAN AREA CLINIC 124 | Facility: CLINIC | Age: 29
End: 2024-08-06

## 2024-08-09 ENCOUNTER — WEB ENCOUNTER (OUTPATIENT)
Dept: URBAN - METROPOLITAN AREA CLINIC 124 | Facility: CLINIC | Age: 29
End: 2024-08-09

## 2024-08-09 ENCOUNTER — TELEPHONE ENCOUNTER (OUTPATIENT)
Dept: URBAN - METROPOLITAN AREA CLINIC 92 | Facility: CLINIC | Age: 29
End: 2024-08-09

## 2024-08-09 LAB
CLOSTRIDIUM DIFFICILE TOXINB,QL REAL TIME PCR: DETECTED
CLOSTRIDIUM DIFFICILE: (no result)

## 2024-08-09 RX ORDER — FIDAXOMICIN 200 MG/1
1 TABLET TABLET, FILM COATED ORAL
Qty: 20 | Refills: 0 | OUTPATIENT
Start: 2024-08-09 | End: 2024-09-03

## 2024-08-13 ENCOUNTER — TELEPHONE ENCOUNTER (OUTPATIENT)
Dept: URBAN - METROPOLITAN AREA CLINIC 92 | Facility: CLINIC | Age: 29
End: 2024-08-13

## 2024-08-13 RX ORDER — FECAL MICROBIOTA SPORES, LIVE-BRPK 30000000 [CFU]/1
4 CAPSULES ON AN EMPTY STOMACH BEFORE FIRST MEAL OF THE DAY CAPSULE ORAL ONCE A DAY
Qty: 12 | OUTPATIENT
Start: 2024-08-13 | End: 2024-08-16

## 2024-08-14 ENCOUNTER — TELEPHONE ENCOUNTER (OUTPATIENT)
Dept: URBAN - METROPOLITAN AREA CLINIC 92 | Facility: CLINIC | Age: 29
End: 2024-08-14

## 2024-08-18 ENCOUNTER — TELEPHONE ENCOUNTER (OUTPATIENT)
Dept: URBAN - METROPOLITAN AREA CLINIC 92 | Facility: CLINIC | Age: 29
End: 2024-08-18

## 2024-08-18 RX ORDER — FECAL MICROBIOTA SPORES, LIVE-BRPK 30000000 [CFU]/1
4 CAPSULES ON AN EMPTY STOMACH BEFORE FIRST MEAL OF THE DAY CAPSULE ORAL ONCE A DAY
Qty: 12
Start: 2024-08-13 | End: 2024-08-21

## 2024-08-19 ENCOUNTER — TELEPHONE ENCOUNTER (OUTPATIENT)
Dept: URBAN - METROPOLITAN AREA CLINIC 124 | Facility: CLINIC | Age: 29
End: 2024-08-19

## 2024-08-21 ENCOUNTER — TELEPHONE ENCOUNTER (OUTPATIENT)
Dept: URBAN - METROPOLITAN AREA CLINIC 124 | Facility: CLINIC | Age: 29
End: 2024-08-21

## 2024-09-09 ENCOUNTER — TELEPHONE ENCOUNTER (OUTPATIENT)
Dept: URBAN - METROPOLITAN AREA CLINIC 92 | Facility: CLINIC | Age: 29
End: 2024-09-09

## 2024-09-09 ENCOUNTER — OFFICE VISIT (OUTPATIENT)
Dept: URBAN - METROPOLITAN AREA TELEHEALTH 2 | Facility: TELEHEALTH | Age: 29
End: 2024-09-09
Payer: COMMERCIAL

## 2024-09-09 VITALS — WEIGHT: 113 LBS | BODY MASS INDEX: 20.8 KG/M2 | HEIGHT: 62 IN

## 2024-09-09 DIAGNOSIS — R11.2 NAUSEA AND VOMITING, UNSPECIFIED VOMITING TYPE: ICD-10-CM

## 2024-09-09 DIAGNOSIS — A04.72 CLOSTRIDIUM DIFFICILE COLITIS: ICD-10-CM

## 2024-09-09 DIAGNOSIS — K50.80 CROHN'S DISEASE OF BOTH SMALL AND LARGE INTESTINE WITHOUT COMPLICATION: ICD-10-CM

## 2024-09-09 DIAGNOSIS — R74.8 ELEVATED LIVER ENZYMES: ICD-10-CM

## 2024-09-09 PROCEDURE — 99214 OFFICE O/P EST MOD 30 MIN: CPT | Performed by: PHYSICIAN ASSISTANT

## 2024-09-09 RX ORDER — OXYCODONE HYDROCHLORIDE AND ACETAMINOPHEN 5; 325 MG/1; MG/1
TAKE 1 TABLET BY MOUTH EVERY 6 HOURS AS NEEDED FOR PAIN TABLET ORAL
Qty: 12 EACH | Refills: 0 | Status: ON HOLD | COMMUNITY

## 2024-09-09 RX ORDER — ZOSTER VACCINE RECOMBINANT, ADJUVANTED 50 MCG/0.5
AS DIRECTED KIT INTRAMUSCULAR
Qty: 0.5 ML | Refills: 1 | OUTPATIENT

## 2024-09-09 RX ORDER — OMEPRAZOLE 40 MG/1
1 CAPSULE 30 MINUTES BEFORE MORNING MEAL CAPSULE, DELAYED RELEASE ORAL ONCE A DAY
Qty: 90 | Refills: 3 | Status: ACTIVE | COMMUNITY

## 2024-09-09 RX ORDER — LEVONORGESTREL/ETHINYL ESTRADIOL AND ETHINYL ESTRADIOL 100-20(84)
KIT ORAL
Qty: 91 TABLET | Status: ACTIVE | COMMUNITY

## 2024-09-09 RX ORDER — ZOSTER VACCINE RECOMBINANT, ADJUVANTED 50 MCG/0.5
AS DIRECTED KIT INTRAMUSCULAR
Qty: 0.5 ML | Refills: 1 | Status: ACTIVE | COMMUNITY
End: 2024-10-05

## 2024-09-09 RX ORDER — UPADACITINIB 45 MG/1
AS DIRECTED TABLET, EXTENDED RELEASE ORAL ONCE A DAY
Qty: 84 | Refills: 0 | Status: ACTIVE | COMMUNITY

## 2024-09-09 RX ORDER — PREDNISONE 10 MG/1
TAKE 4 TABLETS (40MG TOTAL) BY MOUTH IN THE MORNING FOR 5 DAYS TABLET ORAL
Qty: 20 EACH | Refills: 0 | Status: ON HOLD | COMMUNITY

## 2024-09-09 NOTE — HPI-OTHER HISTORIES
29yoF w/ hx of UC (pancolitis diagnosed 12/2018), failed to respond to MTX, 5ASA, Humira, Entyvio and  s/p total colectomy and ileoanal J pouch (12/2019) and ileostomy reversal (1/2020)  now presents for f/u of pouchitis/Crohn's, refractory to multiple advanced tx (Yaya, Skyrizi) and recurent C diff.  Pouchoscopy 3/2021 moderate pouchitis and mild erythema of desirae-TI, bx of TI chronic active ileitis with apthous ulcer c/w crohns and pouch w chronic active ileitis Pouchoscopy 3/11/2022 mild inflammation of pouch with normal desirae-TI, bx  w/ chronic active pouchitis Pouchoscopy 5/12/2023 showed moderate pouchitis but normal desirae-TI, bx + active inflammation/ulceration She was initiated on Rinvoq 45 in Aug 2023 and did well for months but had a lapse in meds from Oct-mid Nov 2' insurance and she presented to the ED in Nov 2023  and CT revealed distended residual rectum, no evidence of obstruction. Prior CT scan on 10/10/23 revealed colectomy with J pouch, distal small bowel with mild wall thickening to the level of anastomosis suggesting mild uncomplicated enteritis.  C diff resulted + s/p dific s/p dificid and did well from november to march and then had recurrent N/V/D. ER at [facility] and CT w/ No acute abdominopelvic abnormality. Unchanged postoperative changes of colectomy with ileoanal  anastomosis and patulous segment of bowel at the anastomosis. EGD was normal, bx neg. C Diff +, tx with dificid again and was back in clinical remission with 4-6 BMs/day, non-bloody and no pain and gained 5# and calpro as below but then presented back in early Aug with 8-10 looser BMs/day and C diff + given 25 day taper which she completed 3-4 days ago. Now notes BMs back to 6/day, formed and no further pain, nausea or fevers.  Injectafer X 2 in May. Pouchoscopy planned for 10/4.  +Rashes on legs, saw derm, triamcinolone--?EN. Joint pain sign better. She got shingrex X 1

## 2024-09-13 ENCOUNTER — TELEPHONE ENCOUNTER (OUTPATIENT)
Dept: URBAN - METROPOLITAN AREA CLINIC 98 | Facility: CLINIC | Age: 29
End: 2024-09-13

## 2024-09-18 ENCOUNTER — TELEPHONE ENCOUNTER (OUTPATIENT)
Dept: URBAN - METROPOLITAN AREA CLINIC 92 | Facility: CLINIC | Age: 29
End: 2024-09-18

## 2024-09-19 ENCOUNTER — OFFICE VISIT (OUTPATIENT)
Dept: URBAN - METROPOLITAN AREA CLINIC 92 | Facility: CLINIC | Age: 29
End: 2024-09-19
Payer: COMMERCIAL

## 2024-09-19 VITALS
HEART RATE: 76 BPM | BODY MASS INDEX: 20.83 KG/M2 | TEMPERATURE: 97 F | HEIGHT: 62 IN | WEIGHT: 113.2 LBS | SYSTOLIC BLOOD PRESSURE: 102 MMHG | DIASTOLIC BLOOD PRESSURE: 72 MMHG

## 2024-09-19 DIAGNOSIS — E55.9 VITAMIN D DEFICIENCY: ICD-10-CM

## 2024-09-19 DIAGNOSIS — K50.80 CROHN'S DISEASE OF BOTH SMALL AND LARGE INTESTINE WITHOUT COMPLICATION: ICD-10-CM

## 2024-09-19 DIAGNOSIS — A04.72 CLOSTRIDIUM DIFFICILE COLITIS: ICD-10-CM

## 2024-09-19 DIAGNOSIS — Z71.85 VACCINE COUNSELING: ICD-10-CM

## 2024-09-19 PROCEDURE — 99214 OFFICE O/P EST MOD 30 MIN: CPT | Performed by: PHYSICIAN ASSISTANT

## 2024-09-19 RX ORDER — OXYCODONE HYDROCHLORIDE AND ACETAMINOPHEN 5; 325 MG/1; MG/1
TAKE 1 TABLET BY MOUTH EVERY 6 HOURS AS NEEDED FOR PAIN TABLET ORAL
Qty: 12 EACH | Refills: 0 | Status: ON HOLD | COMMUNITY

## 2024-09-19 RX ORDER — ZOSTER VACCINE RECOMBINANT, ADJUVANTED 50 MCG/0.5
AS DIRECTED KIT INTRAMUSCULAR
Qty: 0.5 ML | Refills: 1 | OUTPATIENT

## 2024-09-19 RX ORDER — PREDNISONE 10 MG/1
TAKE 4 TABLETS (40MG TOTAL) BY MOUTH IN THE MORNING FOR 5 DAYS TABLET ORAL
Qty: 20 EACH | Refills: 0 | Status: ON HOLD | COMMUNITY

## 2024-09-19 RX ORDER — LEVONORGESTREL/ETHINYL ESTRADIOL AND ETHINYL ESTRADIOL 100-20(84)
KIT ORAL
Qty: 91 TABLET | Status: ACTIVE | COMMUNITY

## 2024-09-19 RX ORDER — UPADACITINIB 30 MG/1
AS DIRECTED TABLET, EXTENDED RELEASE ORAL ONCE A DAY
Qty: 84 | Refills: 0 | Status: ACTIVE | COMMUNITY

## 2024-09-19 RX ORDER — LACTOBACIL 2/BIFIDO 1/S.THERMO 450B CELL
2 CAPSULE PACKET (EA) ORAL ONCE A DAY
Qty: 60 CAPSULE | Refills: 5 | OUTPATIENT
Start: 2024-09-19 | End: 2025-03-18

## 2024-09-19 RX ORDER — ZOSTER VACCINE RECOMBINANT, ADJUVANTED 50 MCG/0.5
AS DIRECTED KIT INTRAMUSCULAR
Qty: 0.5 ML | Refills: 1 | Status: ON HOLD | COMMUNITY

## 2024-09-19 RX ORDER — OMEPRAZOLE 40 MG/1
1 CAPSULE 30 MINUTES BEFORE MORNING MEAL CAPSULE, DELAYED RELEASE ORAL ONCE A DAY
Qty: 90 | Refills: 3 | Status: ON HOLD | COMMUNITY

## 2024-09-19 NOTE — HPI-OTHER HISTORIES
29yoF w/ hx of UC (pancolitis diagnosed 12/2018), failed to respond to MTX, 5ASA, Humira, Entyvio and  s/p total colectomy and ileoanal J pouch (12/2019) and ileostomy reversal (1/2020)  now presents for f/u of pouchitis/Crohn's, refractory to multiple advanced tx (Yaya, Skyrizi) and recurent C diff.  Pouchoscopy 3/2021 moderate pouchitis and mild erythema of desirae-TI, bx of TI chronic active ileitis with apthous ulcer c/w crohns and pouch w chronic active ileitis Pouchoscopy 3/11/2022 mild inflammation of pouch with normal desirae-TI, bx  w/ chronic active pouchitis Pouchoscopy 5/12/2023 showed moderate pouchitis but normal desirae-TI, bx + active inflammation/ulceration She was initiated on Rinvoq 45 in Aug 2023 and did well for months but had a lapse in meds from Oct-mid Nov 2' insurance and she presented to the ED in Nov 2023  and CT revealed distended residual rectum, no evidence of obstruction. Prior CT scan on 10/10/23 revealed colectomy with J pouch, distal small bowel with mild wall thickening to the level of anastomosis suggesting mild uncomplicated enteritis.C diff resulted + s/p dific s/p dificid and did well from november to march and then had recurrent N/V/D. ER at [facility] and CT w/ No acute abdominopelvic abnormality. Unchanged postoperative changes of colectomy with ileoanal anastomosis and patulous segment of bowel at the anastomosis. EGD was normal, bx neg. C Diff +, tx with dificid again and was back in clinical remission and calpro as below but then presented back in early Aug with 8-10 looser BMs/day and C diff + given 25 day taper which she completed 9/6 with good response but then within a week noted diarrhea, N/V. She was seen at [facility] ER. CT Desirae colon shows a focal segment of inflammation left of midline in the pelvis. This does not appear to be causing obstruction at this time. ALT 63 AST                           42 . Hg 16.4 Hct 49. Stool studies neg including c diff. She was given IVF and sx have improved-pain is sign better but still 6/10 on the L-side of her abdomen. Bowels still 10/day but no hematochezia, fevers, chills, hematochezia or melena.  Injectafer X 2 in May. Pouchoscopy planned for 10/4.  +Rashes on legs, saw derm, triamcinolone--?EN. Joint pain (hips, back) increased in the last week.  She got shingrex X 1, pending #2

## 2024-09-19 NOTE — PHYSICAL EXAM GASTROINTESTINAL
Abdomen , soft, mild tenderness L-side, nondistended , no guarding or rigidity , no masses palpable , normal bowel sounds , no hepatomegaly present

## 2024-10-04 ENCOUNTER — CLAIMS CREATED FROM THE CLAIM WINDOW (OUTPATIENT)
Dept: URBAN - METROPOLITAN AREA SURGERY CENTER 16 | Facility: SURGERY CENTER | Age: 29
End: 2024-10-04
Payer: COMMERCIAL

## 2024-10-04 DIAGNOSIS — K91.850 POUCHITIS: ICD-10-CM

## 2024-10-04 DIAGNOSIS — K50.919 CROHN'S DISEASE WITH COMPLICATION, UNSPECIFIED GASTROINTESTINAL TRACT LOCATION: ICD-10-CM

## 2024-10-04 DIAGNOSIS — K63.89 OTHER SPECIFIED DISEASES OF INTESTINE: ICD-10-CM

## 2024-10-04 DIAGNOSIS — K50.10 CC (CROHN'S COLITIS): ICD-10-CM

## 2024-10-04 PROCEDURE — 00811 ANES LWR INTST NDSC NOS: CPT | Performed by: ANESTHESIOLOGY

## 2024-10-04 PROCEDURE — 44386 ENDOSCOPY BOWEL POUCH/BIOP: CPT | Performed by: INTERNAL MEDICINE

## 2024-10-04 PROCEDURE — 00811 ANES LWR INTST NDSC NOS: CPT | Performed by: ANESTHESIOLOGIST ASSISTANT

## 2024-10-04 PROCEDURE — PCHSB: Performed by: INTERNAL MEDICINE

## 2024-10-15 ENCOUNTER — APPOINTMENT (OUTPATIENT)
Dept: URBAN - METROPOLITAN AREA CLINIC 207 | Age: 29
Setting detail: DERMATOLOGY
End: 2024-10-16

## 2024-10-15 DIAGNOSIS — L30.9 DERMATITIS, UNSPECIFIED: ICD-10-CM

## 2024-10-15 DIAGNOSIS — L72.8 OTHER FOLLICULAR CYSTS OF THE SKIN AND SUBCUTANEOUS TISSUE: ICD-10-CM

## 2024-10-15 PROBLEM — R68.89 OTHER GENERAL SYMPTOMS AND SIGNS: Status: ACTIVE | Noted: 2024-10-15

## 2024-10-15 PROCEDURE — OTHER ORDER TESTS: OTHER

## 2024-10-15 PROCEDURE — OTHER COUNSELING: OTHER

## 2024-10-15 PROCEDURE — OTHER ADDITIONAL NOTES: OTHER

## 2024-10-15 PROCEDURE — 11900 INJECT SKIN LESIONS </W 7: CPT

## 2024-10-15 PROCEDURE — OTHER INCISION AND DRAINAGE: OTHER

## 2024-10-15 PROCEDURE — 10060 I&D ABSCESS SIMPLE/SINGLE: CPT

## 2024-10-15 PROCEDURE — OTHER MEDICATION COUNSELING: OTHER

## 2024-10-15 PROCEDURE — OTHER MIPS QUALITY: OTHER

## 2024-10-15 PROCEDURE — OTHER PATIENT SPECIFIC COUNSELING: OTHER

## 2024-10-15 PROCEDURE — OTHER OTHER: OTHER

## 2024-10-15 PROCEDURE — OTHER PRESCRIPTION: OTHER

## 2024-10-15 PROCEDURE — OTHER INTRALESIONAL KENALOG: OTHER

## 2024-10-15 PROCEDURE — OTHER PRESCRIPTION MEDICATION MANAGEMENT: OTHER

## 2024-10-15 PROCEDURE — 99213 OFFICE O/P EST LOW 20 MIN: CPT | Mod: 25

## 2024-10-15 RX ORDER — ECONAZOLE NITRATE 10 MG/G
CREAM TOPICAL
Qty: 30 | Refills: 3 | Status: ERX | COMMUNITY
Start: 2024-10-15

## 2024-10-15 RX ORDER — CLINDAMYCIN PHOSPHATE 10 MG/ML
LOTION TOPICAL
Qty: 60 | Refills: 2 | Status: ERX | COMMUNITY
Start: 2024-10-15

## 2024-10-15 ASSESSMENT — LOCATION ZONE DERM: LOCATION ZONE: AXILLAE

## 2024-10-15 ASSESSMENT — LOCATION DETAILED DESCRIPTION DERM
LOCATION DETAILED: LEFT ANTERIOR AXILLA
LOCATION DETAILED: RIGHT AXILLARY VAULT

## 2024-10-15 ASSESSMENT — LOCATION SIMPLE DESCRIPTION DERM
LOCATION SIMPLE: RIGHT AXILLARY VAULT
LOCATION SIMPLE: LEFT ANTERIOR AXILLA

## 2024-10-15 NOTE — PROCEDURE: INTRALESIONAL KENALOG
Consent: The risks of atrophy were reviewed with the patient.
How Many Mls Were Removed From The 10 Mg/Ml (5ml) Vial When Preparing The Injectable Solution?: 0
Treatment Number (Optional): 1
Detail Level: Detailed
Include Z78.9 (Other Specified Conditions Influencing Health Status) As An Associated Diagnosis?: No
Concentration Of Kenalog Solution Injected (Mg/Ml): 10.0
Kenalog Preparation: Kenalog
Ndc# For Kenalog Only: 4663-3007-12
Show Inventory Tab: Hide
Validate Note Data When Using Inventory: Yes
Total Volume (Ccs): 0.2
Kenalog Type Of Vial: Multiple Dose
Administered By (Optional): Lauren Carrero
Medical Necessity Clause: This procedure was medically necessary because the lesions that were treated were:

## 2024-10-15 NOTE — PROCEDURE: INCISION AND DRAINAGE
Detail Level: Detailed
Lesion Type: Cyst
Method: 11 blade
Curette: No
Size Of Lesion In Cm (Optional But May Be Required For Some Insurances): 0
Wound Care: Petrolatum
Dressing: no dressing
Epidermal Sutures: 4-0 Ethilon
Epidermal Closure: simple interrupted
Suture Text: The incision was partially closed with
Preparation Text: The area was prepped in the usual clean fashion.
Curette Text (Optional): After the contents were expressed a curette was used to partially remove the cyst wall.
Consent was obtained and risks were reviewed including but not limited to delayed wound healing, infection, need for multiple I and D's, and pain.
Render Postcare In Note?: Yes
Post-Care Instructions: I reviewed with the patient in detail post-care instructions. Patient should keep wound covered and call the office should any redness, pain, swelling or worsening occur.

## 2024-10-15 NOTE — PROCEDURE: MEDICATION COUNSELING
Adbry Counseling: I discussed with the patient the risks of tralokinumab including but not limited to eye infection and irritation, cold sores, injection site reactions, worsening of asthma, allergic reactions and increased risk of parasitic infection.  Live vaccines should be avoided while taking tralokinumab. The patient understands that monitoring is required and they must alert us or the primary physician if symptoms of infection or other concerning signs are noted.
Xolair Pregnancy And Lactation Text: This medication is Pregnancy Category B and is considered safe during pregnancy. This medication is excreted in breast milk.
Otezla Pregnancy And Lactation Text: This medication is Pregnancy Category C and it isn't known if it is safe during pregnancy. It is unknown if it is excreted in breast milk.
Sotyktu Counseling:  I discussed the most common side effects of Sotyktu including: common cold, sore throat, sinus infections, cold sores, canker sores, folliculitis, and acne.? I also discussed more serious side effects of Sotyktu including but not limited to: serious allergic reactions; increased risk for infections such as TB; cancers such as lymphomas; rhabdomyolysis and elevated CPK; and elevated triglycerides and liver enzymes.?
Wartpeel Pregnancy And Lactation Text: This medication is Pregnancy Category X and contraindicated in pregnancy and in women who may become pregnant. It is unknown if this medication is excreted in breast milk.
Dutasteride Female Counseling: Dutasteride Counseling:  I discussed with the patient the risks of use of dutasteride including but not limited to decreased libido and sexual dysfunction. Explained the teratogenic nature of the medication and stressed the importance of not getting pregnant during treatment. All of the patient's questions and concerns were addressed.
Hydroxyzine Counseling: Patient advised that the medication is sedating and not to drive a car after taking this medication.  Patient informed of potential adverse effects including but not limited to dry mouth, urinary retention, and blurry vision.  The patient verbalized understanding of the proper use and possible adverse effects of hydroxyzine.  All of the patient's questions and concerns were addressed.
Benzoyl Peroxide Pregnancy And Lactation Text: This medication is Pregnancy Category C. It is unknown if benzoyl peroxide is excreted in breast milk.
Sarecycline Pregnancy And Lactation Text: This medication is Pregnancy Category D and not consider safe during pregnancy. It is also excreted in breast milk.
Topical Ketoconazole Counseling: Patient counseled that this medication may cause skin irritation or allergic reactions.  In the event of skin irritation, the patient was advised to reduce the amount of the drug applied or use it less frequently.   The patient verbalized understanding of the proper use and possible adverse effects of ketoconazole.  All of the patient's questions and concerns were addressed.
Niacinamide Counseling: I recommended taking niacin or niacinamide, also know as vitamin B3, twice daily. Recent evidence suggests that taking vitamin B3 (500 mg twice daily) can reduce the risk of actinic keratoses and non-melanoma skin cancers. Side effects of vitamin B3 include flushing and headache.
Isotretinoin Pregnancy And Lactation Text: This medication is Pregnancy Category X and is considered extremely dangerous during pregnancy. It is unknown if it is excreted in breast milk.
Protopic Counseling: Patient may experience a mild burning sensation during topical application. Protopic is not approved in children less than 2 years of age. There have been case reports of hematologic and skin malignancies in patients using topical calcineurin inhibitors although causality is questionable.
Opioid Counseling: I discussed with the patient the potential side effects of opioids including but not limited to addiction, altered mental status, and depression. I stressed avoiding alcohol, benzodiazepines, muscle relaxants and sleep aids unless specifically okayed by a physician. The patient verbalized understanding of the proper use and possible adverse effects of opioids. All of the patient's questions and concerns were addressed. They were instructed to flush the remaining pills down the toilet if they did not need them for pain.
Azathioprine Counseling:  I discussed with the patient the risks of azathioprine including but not limited to myelosuppression, immunosuppression, hepatotoxicity, lymphoma, and infections.  The patient understands that monitoring is required including baseline LFTs, Creatinine, possible TPMP genotyping and weekly CBCs for the first month and then every 2 weeks thereafter.  The patient verbalized understanding of the proper use and possible adverse effects of azathioprine.  All of the patient's questions and concerns were addressed.
Spevigo Counseling: I discussed with the patient the risks of Spevigo including but not limited to fatigue, nasuea, vomiting, headache, pruritus, urinary tract infection, an infusion related reactions.  The patient understands that monitoring is required including screening for tuberculosis at baseline and yearly screening thereafter while continuing Spevigo therapy. They should contact us if symptoms of infection or other concerning signs are noted.
Infliximab Pregnancy And Lactation Text: This medication is Pregnancy Category B and is considered safe during pregnancy. It is unknown if this medication is excreted in breast milk.
Klisyri Pregnancy And Lactation Text: It is unknown if this medication can harm a developing fetus or if it is excreted in breast milk.
Ketoconazole Counseling:   Patient counseled regarding improving absorption with orange juice.  Adverse effects include but are not limited to breast enlargement, headache, diarrhea, nausea, upset stomach, liver function test abnormalities, taste disturbance, and stomach pain.  There is a rare possibility of liver failure that can occur when taking ketoconazole. The patient understands that monitoring of LFTs may be required, especially at baseline. The patient verbalized understanding of the proper use and possible adverse effects of ketoconazole.  All of the patient's questions and concerns were addressed.
Valtrex Pregnancy And Lactation Text: this medication is Pregnancy Category B and is considered safe during pregnancy. This medication is not directly found in breast milk but it's metabolite acyclovir is present.
Dapsone Pregnancy And Lactation Text: This medication is Pregnancy Category C and is not considered safe during pregnancy or breast feeding.
Bactrim Pregnancy And Lactation Text: This medication is Pregnancy Category D and is known to cause fetal risk.  It is also excreted in breast milk.
Solaraze Pregnancy And Lactation Text: This medication is Pregnancy Category B and is considered safe. There is some data to suggest avoiding during the third trimester. It is unknown if this medication is excreted in breast milk.
Metronidazole Counseling:  I discussed with the patient the risks of metronidazole including but not limited to seizures, nausea/vomiting, a metallic taste in the mouth, nausea/vomiting and severe allergy.
Sotyktu Pregnancy And Lactation Text: There is insufficient data to evaluate whether or not Sotyktu is safe to use during pregnancy.? ?It is not known if Sotyktu passes into breast milk and whether or not it is safe to use when breastfeeding.??
Hydroxyzine Pregnancy And Lactation Text: This medication is not safe during pregnancy and should not be taken. It is also excreted in breast milk and breast feeding isn't recommended.
Carac Counseling:  I discussed with the patient the risks of Carac including but not limited to erythema, scaling, itching, weeping, crusting, and pain.
Adbry Pregnancy And Lactation Text: It is unknown if this medication will adversely affect pregnancy or breast feeding.
Dutasteride Pregnancy And Lactation Text: This medication is absolutely contraindicated in women, especially during pregnancy and breast feeding. Feminization of male fetuses is possible if taking while pregnant.
Elidel Counseling: Patient may experience a mild burning sensation during topical application. Elidel is not approved in children less than 2 years of age. There have been case reports of hematologic and skin malignancies in patients using topical calcineurin inhibitors although causality is questionable.
Enbrel Counseling:  I discussed with the patient the risks of etanercept including but not limited to myelosuppression, immunosuppression, autoimmune hepatitis, demyelinating diseases, lymphoma, and infections.  The patient understands that monitoring is required including a PPD at baseline and must alert us or the primary physician if symptoms of infection or other concerning signs are noted.
High Dose Vitamin A Counseling: Side effects reviewed, pt to contact office should one occur.
Niacinamide Pregnancy And Lactation Text: These medications are considered safe during pregnancy.
Tetracycline Counseling: Patient counseled regarding possible photosensitivity and increased risk for sunburn.  Patient instructed to avoid sunlight, if possible.  When exposed to sunlight, patients should wear protective clothing, sunglasses, and sunscreen.  The patient was instructed to call the office immediately if the following severe adverse effects occur:  hearing changes, easy bruising/bleeding, severe headache, or vision changes.  The patient verbalized understanding of the proper use and possible adverse effects of tetracycline.  All of the patient's questions and concerns were addressed. Patient understands to avoid pregnancy while on therapy due to potential birth defects.
Cibinqo Counseling: I discussed with the patient the risks of Cibinqo therapy including but not limited to common cold, nausea, headache, cold sores, increased blood CPK levels, dizziness, UTIs, fatigue, acne, and vomitting. Live vaccines should be avoided.  This medication has been linked to serious infections; higher rate of mortality; malignancy and lymphoproliferative disorders; major adverse cardiovascular events; thrombosis; thrombocytopenia and lymphopenia; lipid elevations; and retinal detachment.
Topical Ketoconazole Pregnancy And Lactation Text: This medication is Pregnancy Category B and is considered safe during pregnancy. It is unknown if it is excreted in breast milk.
Oxybutynin Counseling:  I discussed with the patient the risks of oxybutynin including but not limited to skin rash, drowsiness, dry mouth, difficulty urinating, and blurred vision.
Winlevi Counseling:  I discussed with the patient the risks of topical clascoterone including but not limited to erythema, scaling, itching, and stinging. Patient voiced their understanding.
Azathioprine Pregnancy And Lactation Text: This medication is Pregnancy Category D and isn't considered safe during pregnancy. It is unknown if this medication is excreted in breast milk.
Metronidazole Pregnancy And Lactation Text: This medication is Pregnancy Category B and considered safe during pregnancy.  It is also excreted in breast milk.
Gabapentin Counseling: I discussed with the patient the risks of gabapentin including but not limited to dizziness, somnolence, fatigue and ataxia.
Minoxidil Counseling: Minoxidil is a topical medication which can increase blood flow where it is applied. It is uncertain how this medication increases hair growth. Side effects are uncommon and include stinging and allergic reactions.
Rituxan Counseling:  I discussed with the patient the risks of Rituxan infusions. Side effects can include infusion reactions, severe drug rashes including mucocutaneous reactions, reactivation of latent hepatitis and other infections and rarely progressive multifocal leukoencephalopathy.  All of the patient's questions and concerns were addressed.
Ketoconazole Pregnancy And Lactation Text: This medication is Pregnancy Category C and it isn't know if it is safe during pregnancy. It is also excreted in breast milk and breast feeding isn't recommended.
Protopic Pregnancy And Lactation Text: This medication is Pregnancy Category C. It is unknown if this medication is excreted in breast milk when applied topically.
Soolantra Counseling: I discussed with the patients the risks of topial Soolantra. This is a medicine which decreases the number of mites and inflammation in the skin. You experience burning, stinging, eye irritation or allergic reactions.  Please call our office if you develop any problems from using this medication.
Use Enhanced Medication Counseling?: No
Spevigo Pregnancy And Lactation Text: The risk during pregnancy and breastfeeding is uncertain with this medication. This medication does cross the placenta. It is unknown if this medication is found in breast milk.
Opioid Pregnancy And Lactation Text: These medications can lead to premature delivery and should be avoided during pregnancy. These medications are also present in breast milk in small amounts.
Cephalexin Counseling: I counseled the patient regarding use of cephalexin as an antibiotic for prophylactic and/or therapeutic purposes. Cephalexin (commonly prescribed under brand name Keflex) is a cephalosporin antibiotic which is active against numerous classes of bacteria, including most skin bacteria. Side effects may include nausea, diarrhea, gastrointestinal upset, rash, hives, yeast infections, and in rare cases, hepatitis, kidney disease, seizures, fever, confusion, neurologic symptoms, and others. Patients with severe allergies to penicillin medications are cautioned that there is about a 10% incidence of cross-reactivity with cephalosporins. When possible, patients with penicillin allergies should use alternatives to cephalosporins for antibiotic therapy.
Finasteride Male Counseling: Finasteride Counseling:  I discussed with the patient the risks of use of finasteride including but not limited to decreased libido, decreased ejaculate volume, gynecomastia, and depression. Women should not handle medication.  All of the patient's questions and concerns were addressed.
Erivedge Counseling- I discussed with the patient the risks of Erivedge including but not limited to nausea, vomiting, diarrhea, constipation, weight loss, changes in the sense of taste, decreased appetite, muscle spasms, and hair loss.  The patient verbalized understanding of the proper use and possible adverse effects of Erivedge.  All of the patient's questions and concerns were addressed.
Elidel Pregnancy And Lactation Text: This medication is Pregnancy Category C. It is unknown if this medication is excreted in breast milk.
Xeljanz Counseling: I discussed with the patient the risks of Xeljanz therapy including increased risk of infection, liver issues, headache, diarrhea, or cold symptoms. Live vaccines should be avoided. They were instructed to call if they have any problems.
Winlevi Pregnancy And Lactation Text: This medication is considered safe during pregnancy and breastfeeding.
Cellcept Counseling:  I discussed with the patient the risks of mycophenolate mofetil including but not limited to infection/immunosuppression, GI upset, hypokalemia, hypercholesterolemia, bone marrow suppression, lymphoproliferative disorders, malignancy, GI ulceration/bleed/perforation, colitis, interstitial lung disease, kidney failure, progressive multifocal leukoencephalopathy, and birth defects.  The patient understands that monitoring is required including a baseline creatinine and regular CBC testing. In addition, patient must alert us immediately if symptoms of infection or other concerning signs are noted.
Bimzelx Counseling:  I discussed with the patient the risks of Bimzelx including but not limited to depression, immunosuppression, allergic reactions and infections.  The patient understands that monitoring is required including a PPD at baseline and must alert us or the primary physician if symptoms of infection or other concerning signs are noted.
Qbrexza Counseling:  I discussed with the patient the risks of Qbrexza including but not limited to headache, mydriasis, blurred vision, dry eyes, nasal dryness, dry mouth, dry throat, dry skin, urinary hesitation, and constipation.  Local skin reactions including erythema, burning, stinging, and itching can also occur.
Stelara Counseling:  I discussed with the patient the risks of ustekinumab including but not limited to immunosuppression, malignancy, posterior leukoencephalopathy syndrome, and serious infections.  The patient understands that monitoring is required including a PPD at baseline and must alert us or the primary physician if symptoms of infection or other concerning signs are noted.
Albendazole Counseling:  I discussed with the patient the risks of albendazole including but not limited to cytopenia, kidney damage, nausea/vomiting and severe allergy.  The patient understands that this medication is being used in an off-label manner.
Soolantra Pregnancy And Lactation Text: This medication is Pregnancy Category C. This medication is considered safe during breast feeding.
Cibinqo Pregnancy And Lactation Text: It is unknown if this medication will adversely affect pregnancy or breast feeding.  You should not take this medication if you are currently pregnant or planning a pregnancy or while breastfeeding.
Topical Metronidazole Counseling: Metronidazole is a topical antibiotic medication. You may experience burning, stinging, redness, or allergic reactions.  Please call our office if you develop any problems from using this medication.
Nsaids Counseling: NSAID Counseling: I discussed with the patient that NSAIDs should be taken with food. Prolonged use of NSAIDs can result in the development of stomach ulcers.  Patient advised to stop taking NSAIDs if abdominal pain occurs.  The patient verbalized understanding of the proper use and possible adverse effects of NSAIDs.  All of the patient's questions and concerns were addressed.
High Dose Vitamin A Pregnancy And Lactation Text: High dose vitamin A therapy is contraindicated during pregnancy and breast feeding.
Erivedge Pregnancy And Lactation Text: This medication is Pregnancy Category X and is absolutely contraindicated during pregnancy. It is unknown if it is excreted in breast milk.
Minocycline Counseling: Patient advised regarding possible photosensitivity and discoloration of the teeth, skin, lips, tongue and gums.  Patient instructed to avoid sunlight, if possible.  When exposed to sunlight, patients should wear protective clothing, sunglasses, and sunscreen.  The patient was instructed to call the office immediately if the following severe adverse effects occur:  hearing changes, easy bruising/bleeding, severe headache, or vision changes.  The patient verbalized understanding of the proper use and possible adverse effects of minocycline.  All of the patient's questions and concerns were addressed.
Gabapentin Pregnancy And Lactation Text: This medication is Pregnancy Category C and isn't considered safe during pregnancy. It is excreted in breast milk.
Eucrisa Counseling: Patient may experience a mild burning sensation during topical application. Eucrisa is not approved in children less than 2 years of age.
Humira Counseling:  I discussed with the patient the risks of adalimumab including but not limited to myelosuppression, immunosuppression, autoimmune hepatitis, demyelinating diseases, lymphoma, and serious infections.  The patient understands that monitoring is required including a PPD at baseline and must alert us or the primary physician if symptoms of infection or other concerning signs are noted.
Minoxidil Pregnancy And Lactation Text: This medication has not been assigned a Pregnancy Risk Category but animal studies failed to show danger with the topical medication. It is unknown if the medication is excreted in breast milk.
Terbinafine Counseling: Patient counseling regarding adverse effects of terbinafine including but not limited to headache, diarrhea, rash, upset stomach, liver function test abnormalities, itching, taste/smell disturbance, nausea, abdominal pain, and flatulence.  There is a rare possibility of liver failure that can occur when taking terbinafine.  The patient understands that a baseline LFT and kidney function test may be required. The patient verbalized understanding of the proper use and possible adverse effects of terbinafine.  All of the patient's questions and concerns were addressed.
Rituxan Pregnancy And Lactation Text: This medication is Pregnancy Category C and it isn't know if it is safe during pregnancy. It is unknown if this medication is excreted in breast milk but similar antibodies are known to be excreted.
Propranolol Counseling:  I discussed with the patient the risks of propranolol including but not limited to low heart rate, low blood pressure, low blood sugar, restlessness and increased cold sensitivity. They should call the office if they experience any of these side effects.
Cephalexin Pregnancy And Lactation Text: This medication is Pregnancy Category B and considered safe during pregnancy.  It is also excreted in breast milk but can be used safely for shorter doses.
Detail Level: Simple
VTAMA Counseling: I discussed with the patient that VTAMA is not for use in the eyes, mouth or mouth. They should call the office if they develop any signs of allergic reactions to VTAMA. The patient verbalized understanding of the proper use and possible adverse effects of VTAMA.  All of the patient's questions and concerns were addressed.
Finasteride Female Counseling: Finasteride Counseling:  I discussed with the patient the risks of use of finasteride including but not limited to decreased libido and sexual dysfunction. Explained the teratogenic nature of the medication and stressed the importance of not getting pregnant during treatment. All of the patient's questions and concerns were addressed.
Arava Counseling:  Patient counseled regarding adverse effects of Arava including but not limited to nausea, vomiting, abnormalities in liver function tests. Patients may develop mouth sores, rash, diarrhea, and abnormalities in blood counts. The patient understands that monitoring is required including LFTs and blood counts.  There is a rare possibility of scarring of the liver and lung problems that can occur when taking methotrexate. Persistent nausea, loss of appetite, pale stools, dark urine, cough, and shortness of breath should be reported immediately. Patient advised to discontinue Arava treatment and consult with a physician prior to attempting conception. The patient will have to undergo a treatment to eliminate Arava from the body prior to conception.
Xelkirillz Pregnancy And Lactation Text: This medication is Pregnancy Category D and is not considered safe during pregnancy.  The risk during breast feeding is also uncertain.
Litfulo Counseling: I discussed with the patient the risks of Litfulo therapy including but not limited to upper respiratory tract infections, shingles, cold sores, and nausea. Live vaccines should be avoided.  This medication has been linked to serious infections; higher rate of mortality; malignancy and lymphoproliferative disorders; major adverse cardiovascular events; thrombosis; gastrointestinal perforations; neutropenia; lymphopenia; anemia; liver enzyme elevations; and lipid elevations.
Qbrexza Pregnancy And Lactation Text: There is no available data on Qbrexza use in pregnant women.  There is no available data on Qbrexza use in lactation.
Albendazole Pregnancy And Lactation Text: This medication is Pregnancy Category C and it isn't known if it is safe during pregnancy. It is also excreted in breast milk.
Calcipotriene Counseling:  I discussed with the patient the risks of calcipotriene including but not limited to erythema, scaling, itching, and irritation.
Bimzelx Pregnancy And Lactation Text: This medication crosses the placenta and the safety is uncertain during pregnancy. It is unknown if this medication is present in breast milk.
Topical Metronidazole Pregnancy And Lactation Text: This medication is Pregnancy Category B and considered safe during pregnancy.  It is also considered safe to use while breastfeeding.
Siliq Counseling:  I discussed with the patient the risks of Siliq including but not limited to new or worsening depression, suicidal thoughts and behavior, immunosuppression, malignancy, posterior leukoencephalopathy syndrome, and serious infections.  The patient understands that monitoring is required including a PPD at baseline and must alert us or the primary physician if symptoms of infection or other concerning signs are noted. There is also a special program designed to monitor depression which is required with Siliq.
Topical Retinoid counseling:  Patient advised to apply a pea-sized amount only at bedtime and wait 30 minutes after washing their face before applying.  If too drying, patient may add a non-comedogenic moisturizer. The patient verbalized understanding of the proper use and possible adverse effects of retinoids.  All of the patient's questions and concerns were addressed.
Glycopyrrolate Counseling:  I discussed with the patient the risks of glycopyrrolate including but not limited to skin rash, drowsiness, dry mouth, difficulty urinating, and blurred vision.
Nsaids Pregnancy And Lactation Text: These medications are considered safe up to 30 weeks gestation. It is excreted in breast milk.
Mirvaso Counseling: Mirvaso is a topical medication which can decrease superficial blood flow where applied. Side effects are uncommon and include stinging, redness and allergic reactions.
Fluconazole Counseling:  Patient counseled regarding adverse effects of fluconazole including but not limited to headache, diarrhea, nausea, upset stomach, liver function test abnormalities, taste disturbance, and stomach pain.  There is a rare possibility of liver failure that can occur when taking fluconazole.  The patient understands that monitoring of LFTs and kidney function test may be required, especially at baseline. The patient verbalized understanding of the proper use and possible adverse effects of fluconazole.  All of the patient's questions and concerns were addressed.
Libtayo Counseling- I discussed with the patient the risks of Libtayo including but not limited to nausea, vomiting, diarrhea, and bone or muscle pain.  The patient verbalized understanding of the proper use and possible adverse effects of Libtayo.  All of the patient's questions and concerns were addressed.
Terbinafine Pregnancy And Lactation Text: This medication is Pregnancy Category B and is considered safe during pregnancy. It is also excreted in breast milk and breast feeding isn't recommended.
Clindamycin Counseling: I counseled the patient regarding use of clindamycin as an antibiotic for prophylactic and/or therapeutic purposes. Clindamycin is active against numerous classes of bacteria, including skin bacteria. Side effects may include nausea, diarrhea, gastrointestinal upset, rash, hives, yeast infections, and in rare cases, colitis.
Sski Pregnancy And Lactation Text: This medication is Pregnancy Category D and isn't considered safe during pregnancy. It is excreted in breast milk.
Calcipotriene Pregnancy And Lactation Text: The use of this medication during pregnancy or lactation is not recommended as there is insufficient data.
Cimzia Counseling:  I discussed with the patient the risks of Cimzia including but not limited to immunosuppression, allergic reactions and infections.  The patient understands that monitoring is required including a PPD at baseline and must alert us or the primary physician if symptoms of infection or other concerning signs are noted.
Topical Steroids Counseling: I discussed with the patient that prolonged use of topical steroids can result in the increased appearance of superficial blood vessels (telangiectasias), lightening (hypopigmentation) and thinning of the skin (atrophy).  Patient understands to avoid using high potency steroids in skin folds, the groin or the face.  The patient verbalized understanding of the proper use and possible adverse effects of topical steroids.  All of the patient's questions and concerns were addressed.
Vtama Pregnancy And Lactation Text: It is unknown if this medication can cause problems during pregnancy and breastfeeding.
Finasteride Pregnancy And Lactation Text: This medication is absolutely contraindicated during pregnancy. It is unknown if it is excreted in breast milk.
Litfulo Pregnancy And Lactation Text: Based on animal studies, Lifulo may cause embryo-fetal harm when administered to pregnant women.  The medication should not be used in pregnancy.  Breastfeeding is not recommended during treatment.
Propranolol Pregnancy And Lactation Text: This medication is Pregnancy Category C and it isn't known if it is safe during pregnancy. It is excreted in breast milk.
Siliq Pregnancy And Lactation Text: The risk during pregnancy and breastfeeding is uncertain with this medication.
Aklief counseling:  Patient advised to apply a pea-sized amount only at bedtime and wait 30 minutes after washing their face before applying.  If too drying, patient may add a non-comedogenic moisturizer.  The most commonly reported side effects including irritation, redness, scaling, dryness, stinging, burning, itching, and increased risk of sunburn.  The patient verbalized understanding of the proper use and possible adverse effects of retinoids.  All of the patient's questions and concerns were addressed.
Olanzapine Counseling- I discussed with the patient the common side effects of olanzapine including but are not limited to: lack of energy, dry mouth, increased appetite, sleepiness, tremor, constipation, dizziness, changes in behavior, or restlessness.  Explained that teenagers are more likely to experience headaches, abdominal pain, pain in the arms or legs, tiredness, and sleepiness.  Serious side effects include but are not limited: increased risk of death in elderly patients who are confused, have memory loss, or dementia-related psychosis; hyperglycemia; increased cholesterol and triglycerides; and weight gain.
Acitretin Counseling:  I discussed with the patient the risks of acitretin including but not limited to hair loss, dry lips/skin/eyes, liver damage, hyperlipidemia, depression/suicidal ideation, photosensitivity.  Serious rare side effects can include but are not limited to pancreatitis, pseudotumor cerebri, bony changes, clot formation/stroke/heart attack.  Patient understands that alcohol is contraindicated since it can result in liver toxicity and significantly prolong the elimination of the drug by many years.
Cyclophosphamide Counseling:  I discussed with the patient the risks of cyclophosphamide including but not limited to hair loss, hormonal abnormalities, decreased fertility, abdominal pain, diarrhea, nausea and vomiting, bone marrow suppression and infection. The patient understands that monitoring is required while taking this medication.
Taltz Counseling: I discussed with the patient the risks of ixekizumab including but not limited to immunosuppression, serious infections, worsening of inflammatory bowel disease and drug reactions.  The patient understands that monitoring is required including a PPD at baseline and must alert us or the primary physician if symptoms of infection or other concerning signs are noted.
Mirvaso Pregnancy And Lactation Text: This medication has not been assigned a Pregnancy Risk Category. It is unknown if the medication is excreted in breast milk.
Clofazimine Counseling:  I discussed with the patient the risks of clofazimine including but not limited to skin and eye pigmentation, liver damage, nausea/vomiting, gastrointestinal bleeding and allergy.
Glycopyrrolate Pregnancy And Lactation Text: This medication is Pregnancy Category B and is considered safe during pregnancy. It is unknown if it is excreted breast milk.
Thalidomide Counseling: I discussed with the patient the risks of thalidomide including but not limited to birth defects, anxiety, weakness, chest pain, dizziness, cough and severe allergy.
Ivermectin Counseling:  Patient instructed to take medication on an empty stomach with a full glass of water.  Patient informed of potential adverse effects including but not limited to nausea, diarrhea, dizziness, itching, and swelling of the extremities or lymph nodes.  The patient verbalized understanding of the proper use and possible adverse effects of ivermectin.  All of the patient's questions and concerns were addressed.
Clindamycin Pregnancy And Lactation Text: This medication can be used in pregnancy if certain situations. Clindamycin is also present in breast milk.
Quinolones Counseling:  I discussed with the patient the risks of fluoroquinolones including but not limited to GI upset, allergic reaction, drug rash, diarrhea, dizziness, photosensitivity, yeast infections, liver function test abnormalities, tendonitis/tendon rupture.
Libtayo Pregnancy And Lactation Text: This medication is contraindicated in pregnancy and when breast feeding.
Cimzia Pregnancy And Lactation Text: This medication crosses the placenta but can be considered safe in certain situations. Cimzia may be excreted in breast milk.
Cantharidin Counseling:  I discussed with the patient the risks of Cantharidin including but not limited to pain, redness, burning, itching, and blistering.
Cyclosporine Pregnancy And Lactation Text: This medication is Pregnancy Category C and it isn't know if it is safe during pregnancy. This medication is excreted in breast milk.
Birth Control Pills Counseling: Birth Control Pill Counseling: I discussed with the patient the potential side effects of OCPs including but not limited to increased risk of stroke, heart attack, thrombophlebitis, deep venous thrombosis, hepatic adenomas, breast changes, GI upset, headaches, and depression.  The patient verbalized understanding of the proper use and possible adverse effects of OCPs. All of the patient's questions and concerns were addressed.
Hydroquinone Counseling:  Patient advised that medication may result in skin irritation, lightening (hypopigmentation), dryness, and burning.  In the event of skin irritation, the patient was advised to reduce the amount of the drug applied or use it less frequently.  Rarely, spots that are treated with hydroquinone can become darker (pseudoochronosis).  Should this occur, patient instructed to stop medication and call the office. The patient verbalized understanding of the proper use and possible adverse effects of hydroquinone.  All of the patient's questions and concerns were addressed.
Fluconazole Pregnancy And Lactation Text: This medication is Pregnancy Category C and it isn't know if it is safe during pregnancy. It is also excreted in breast milk.
Zoryve Counseling:  I discussed with the patient that Zoryve is not for use in the eyes, mouth or vagina. The most commonly reported side effects include diarrhea, headache, insomnia, application site pain, upper respiratory tract infections, and urinary tract infections.  All of the patient's questions and concerns were addressed.
Hyrimoz Counseling:  I discussed with the patient the risks of adalimumab including but not limited to myelosuppression, immunosuppression, autoimmune hepatitis, demyelinating diseases, lymphoma, and serious infections.  The patient understands that monitoring is required including a PPD at baseline and must alert us or the primary physician if symptoms of infection or other concerning signs are noted.
Aklief Pregnancy And Lactation Text: It is unknown if this medication is safe to use during pregnancy.  It is unknown if this medication is excreted in breast milk.  Breastfeeding women should use the topical cream on the smallest area of the skin for the shortest time needed while breastfeeding.  Do not apply to nipple and areola.
Olanzapine Pregnancy And Lactation Text: This medication is pregnancy category C.   There are no adequate and well controlled trials with olanzapine in pregnant females.  Olanzapine should be used during pregnancy only if the potential benefit justifies the potential risk to the fetus.   In a study in lactating healthy women, olanzapine was excreted in breast milk.  It is recommended that women taking olanzapine should not breast feed.
Topical Steroids Applications Pregnancy And Lactation Text: Most topical steroids are considered safe to use during pregnancy and lactation.  Any topical steroid applied to the breast or nipple should be washed off before breastfeeding.
Olumiant Counseling: I discussed with the patient the risks of Olumiant therapy including but not limited to upper respiratory tract infections, shingles, cold sores, and nausea. Live vaccines should be avoided.  This medication has been linked to serious infections; higher rate of mortality; malignancy and lymphoproliferative disorders; major adverse cardiovascular events; thrombosis; gastrointestinal perforations; neutropenia; lymphopenia; anemia; liver enzyme elevations; and lipid elevations.
SSKI Counseling:  I discussed with the patient the risks of SSKI including but not limited to thyroid abnormalities, metallic taste, GI upset, fever, headache, acne, arthralgias, paraesthesias, lymphadenopathy, easy bleeding, arrhythmias, and allergic reaction.
Cyclophosphamide Pregnancy And Lactation Text: This medication is Pregnancy Category D and it isn't considered safe during pregnancy. This medication is excreted in breast milk.
Cimetidine Counseling:  I discussed with the patient the risks of Cimetidine including but not limited to gynecomastia, headache, diarrhea, nausea, drowsiness, arrhythmias, pancreatitis, skin rashes, psychosis, bone marrow suppression and kidney toxicity.
Odomzo Counseling- I discussed with the patient the risks of Odomzo including but not limited to nausea, vomiting, diarrhea, constipation, weight loss, changes in the sense of taste, decreased appetite, muscle spasms, and hair loss.  The patient verbalized understanding of the proper use and possible adverse effects of Odomzo.  All of the patient's questions and concerns were addressed.
Tazorac Counseling:  Patient advised that medication is irritating and drying.  Patient may need to apply sparingly and wash off after an hour before eventually leaving it on overnight.  The patient verbalized understanding of the proper use and possible adverse effects of tazorac.  All of the patient's questions and concerns were addressed.
Hydroxychloroquine Counseling:  I discussed with the patient that a baseline ophthalmologic exam is needed at the start of therapy and every year thereafter while on therapy. A CBC may also be warranted for monitoring.  The side effects of this medication were discussed with the patient, including but not limited to agranulocytosis, aplastic anemia, seizures, rashes, retinopathy, and liver toxicity. Patient instructed to call the office should any adverse effect occur.  The patient verbalized understanding of the proper use and possible adverse effects of Plaquenil.  All the patient's questions and concerns were addressed.
Acitretin Pregnancy And Lactation Text: This medication is Pregnancy Category X and should not be given to women who are pregnant or may become pregnant in the future. This medication is excreted in breast milk.
Opzelura Counseling:  I discussed with the patient the risks of Opzelura including but not limited to nasopharngitis, bronchitis, ear infection, eosinophila, hives, diarrhea, folliculitis, tonsillitis, and rhinorrhea.  Taken orally, this medication has been linked to serious infections; higher rate of mortality; malignancy and lymphoproliferative disorders; major adverse cardiovascular events; thrombosis; thrombocytopenia, anemia, and neutropenia; and lipid elevations.
Cantharidin Pregnancy And Lactation Text: This medication has not been proven safe during pregnancy. It is unknown if this medication is excreted in breast milk.
Griseofulvin Counseling:  I discussed with the patient the risks of griseofulvin including but not limited to photosensitivity, cytopenia, liver damage, nausea/vomiting and severe allergy.  The patient understands that this medication is best absorbed when taken with a fatty meal (e.g., ice cream or french fries).
Methotrexate Counseling:  Patient counseled regarding adverse effects of methotrexate including but not limited to nausea, vomiting, abnormalities in liver function tests. Patients may develop mouth sores, rash, diarrhea, and abnormalities in blood counts. The patient understands that monitoring is required including LFT's and blood counts.  There is a rare possibility of scarring of the liver and lung problems that can occur when taking methotrexate. Persistent nausea, loss of appetite, pale stools, dark urine, cough, and shortness of breath should be reported immediately. Patient advised to discontinue methotrexate treatment at least three months before attempting to become pregnant.  I discussed the need for folate supplements while taking methotrexate.  These supplements can decrease side effects during methotrexate treatment. The patient verbalized understanding of the proper use and possible adverse effects of methotrexate.  All of the patient's questions and concerns were addressed.
Doxycycline Counseling:  Patient counseled regarding possible photosensitivity and increased risk for sunburn.  Patient instructed to avoid sunlight, if possible.  When exposed to sunlight, patients should wear protective clothing, sunglasses, and sunscreen.  The patient was instructed to call the office immediately if the following severe adverse effects occur:  hearing changes, easy bruising/bleeding, severe headache, or vision changes.  The patient verbalized understanding of the proper use and possible adverse effects of doxycycline.  All of the patient's questions and concerns were addressed.
Birth Control Pills Pregnancy And Lactation Text: This medication should be avoided if pregnant and for the first 30 days post-partum.
Simponi Counseling:  I discussed with the patient the risks of golimumab including but not limited to myelosuppression, immunosuppression, autoimmune hepatitis, demyelinating diseases, lymphoma, and serious infections.  The patient understands that monitoring is required including a PPD at baseline and must alert us or the primary physician if symptoms of infection or other concerning signs are noted.
Olumiant Pregnancy And Lactation Text: Based on animal studies, Olumiant may cause embryo-fetal harm when administered to pregnant women.  The medication should not be used in pregnancy.  Breastfeeding is not recommended during treatment.
Cyclosporine Counseling:  I discussed with the patient the risks of cyclosporine including but not limited to hypertension, gingival hyperplasia,myelosuppression, immunosuppression, liver damage, kidney damage, neurotoxicity, lymphoma, and serious infections. The patient understands that monitoring is required including baseline blood pressure, CBC, CMP, lipid panel and uric acid, and then 1-2 times monthly CMP and blood pressure.
5-Fu Counseling: 5-Fluorouracil Counseling:  I discussed with the patient the risks of 5-fluorouracil including but not limited to erythema, scaling, itching, weeping, crusting, and pain.
Cosentyx Counseling:  I discussed with the patient the risks of Cosentyx including but not limited to worsening of Crohn's disease, immunosuppression, allergic reactions and infections.  The patient understands that monitoring is required including a PPD at baseline and must alert us or the primary physician if symptoms of infection or other concerning signs are noted.
Tremfya Counseling: I discussed with the patient the risks of guselkumab including but not limited to immunosuppression, serious infections, and drug reactions.  The patient understands that monitoring is required including a PPD at baseline and must alert us or the primary physician if symptoms of infection or other concerning signs are noted.
Azelaic Acid Counseling: Patient counseled that medicine may cause skin irritation and to avoid applying near the eyes.  In the event of skin irritation, the patient was advised to reduce the amount of the drug applied or use it less frequently.   The patient verbalized understanding of the proper use and possible adverse effects of azelaic acid.  All of the patient's questions and concerns were addressed.
Hydroxychloroquine Pregnancy And Lactation Text: This medication has been shown to cause fetal harm but it isn't assigned a Pregnancy Risk Category. There are small amounts excreted in breast milk.
Rifampin Counseling: I discussed with the patient the risks of rifampin including but not limited to liver damage, kidney damage, red-orange body fluids, nausea/vomiting and severe allergy.
Tazorac Pregnancy And Lactation Text: This medication is not safe during pregnancy. It is unknown if this medication is excreted in breast milk.
Topical Sulfur Applications Counseling: Topical Sulfur Counseling: Patient counseled that this medication may cause skin irritation or allergic reactions.  In the event of skin irritation, the patient was advised to reduce the amount of the drug applied or use it less frequently.   The patient verbalized understanding of the proper use and possible adverse effects of topical sulfur application.  All of the patient's questions and concerns were addressed.
Oral Minoxidil Counseling- I discussed with the patient the risks of oral minoxidil including but not limited to shortness of breath, swelling of the feet or ankles, dizziness, lightheadedness, unwanted hair growth and allergic reaction.  The patient verbalized understanding of the proper use and possible adverse effects of oral minoxidil.  All of the patient's questions and concerns were addressed.
Spironolactone Counseling: Patient advised regarding risks of diarrhea, abdominal pain, hyperkalemia, birth defects (for female patients), liver toxicity and renal toxicity. The patient may need blood work to monitor liver and kidney function and potassium levels while on therapy. The patient verbalized understanding of the proper use and possible adverse effects of spironolactone.  All of the patient's questions and concerns were addressed.
Bexarotene Counseling:  I discussed with the patient the risks of bexarotene including but not limited to hair loss, dry lips/skin/eyes, liver abnormalities, hyperlipidemia, pancreatitis, depression/suicidal ideation, photosensitivity, drug rash/allergic reactions, hypothyroidism, anemia, leukopenia, infection, cataracts, and teratogenicity.  Patient understands that they will need regular blood tests to check lipid profile, liver function tests, white blood cell count, thyroid function tests and pregnancy test if applicable.
Ilumya Counseling: I discussed with the patient the risks of tildrakizumab including but not limited to immunosuppression, malignancy, posterior leukoencephalopathy syndrome, and serious infections.  The patient understands that monitoring is required including a PPD at baseline and must alert us or the primary physician if symptoms of infection or other concerning signs are noted.
Imiquimod Counseling:  I discussed with the patient the risks of imiquimod including but not limited to erythema, scaling, itching, weeping, crusting, and pain.  Patient understands that the inflammatory response to imiquimod is variable from person to person and was educated regarded proper titration schedule.  If flu-like symptoms develop, patient knows to discontinue the medication and contact us.
Griseofulvin Pregnancy And Lactation Text: This medication is Pregnancy Category X and is known to cause serious birth defects. It is unknown if this medication is excreted in breast milk but breast feeding should be avoided.
Opzelura Pregnancy And Lactation Text: There is insufficient data to evaluate drug-associated risk for major birth defects, miscarriage, or other adverse maternal or fetal outcomes.  There is a pregnancy registry that monitors pregnancy outcomes in pregnant persons exposed to the medication during pregnancy.  It is unknown if this medication is excreted in breast milk.  Do not breastfeed during treatment and for about 4 weeks after the last dose.
Tranexamic Acid Counseling:  Patient advised of the small risk of bleeding problems with tranexamic acid. They were also instructed to call if they developed any nausea, vomiting or diarrhea. All of the patient's questions and concerns were addressed.
Rhofade Counseling: Rhofade is a topical medication which can decrease superficial blood flow where applied. Side effects are uncommon and include stinging, redness and allergic reactions.
Methotrexate Pregnancy And Lactation Text: This medication is Pregnancy Category X and is known to cause fetal harm. This medication is excreted in breast milk.
Doxycycline Pregnancy And Lactation Text: This medication is Pregnancy Category D and not consider safe during pregnancy. It is also excreted in breast milk but is considered safe for shorter treatment courses.
Zyclara Counseling:  I discussed with the patient the risks of imiquimod including but not limited to erythema, scaling, itching, weeping, crusting, and pain.  Patient understands that the inflammatory response to imiquimod is variable from person to person and was educated regarded proper titration schedule.  If flu-like symptoms develop, patient knows to discontinue the medication and contact us.
Doxepin Counseling:  Patient advised that the medication is sedating and not to drive a car after taking this medication. Patient informed of potential adverse effects including but not limited to dry mouth, urinary retention, and blurry vision.  The patient verbalized understanding of the proper use and possible adverse effects of doxepin.  All of the patient's questions and concerns were addressed.
Azithromycin Counseling:  I discussed with the patient the risks of azithromycin including but not limited to GI upset, allergic reaction, drug rash, diarrhea, and yeast infections.
Colchicine Counseling:  Patient counseled regarding adverse effects including but not limited to stomach upset (nausea, vomiting, stomach pain, or diarrhea).  Patient instructed to limit alcohol consumption while taking this medication.  Colchicine may reduce blood counts especially with prolonged use.  The patient understands that monitoring of kidney function and blood counts may be required, especially at baseline. The patient verbalized understanding of the proper use and possible adverse effects of colchicine.  All of the patient's questions and concerns were addressed.
Rinvoq Counseling: I discussed with the patient the risks of Rinvoq therapy including but not limited to upper respiratory tract infections, shingles, cold sores, bronchitis, nausea, cough, fever, acne, and headache. Live vaccines should be avoided.  This medication has been linked to serious infections; higher rate of mortality; malignancy and lymphoproliferative disorders; major adverse cardiovascular events; thrombosis; thrombocytopenia, anemia, and neutropenia; lipid elevations; liver enzyme elevations; and gastrointestinal perforations.
Topical Sulfur Applications Pregnancy And Lactation Text: This medication is considered safe during pregnancy and breast feeding secondary to limited systemic absorption.
Oral Minoxidil Pregnancy And Lactation Text: This medication should only be used when clearly needed if you are pregnant, attempting to become pregnant or breast feeding.
Azelaic Acid Pregnancy And Lactation Text: This medication is considered safe during pregnancy and breast feeding.
Bexarotene Pregnancy And Lactation Text: This medication is Pregnancy Category X and should not be given to women who are pregnant or may become pregnant. This medication should not be used if you are breast feeding.
Skyrizi Counseling: I discussed with the patient the risks of risankizumab-rzaa including but not limited to immunosuppression, and serious infections.  The patient understands that monitoring is required including a PPD at baseline and must alert us or the primary physician if symptoms of infection or other concerning signs are noted.
Picato Counseling:  I discussed with the patient the risks of Picato including but not limited to erythema, scaling, itching, weeping, crusting, and pain.
Low Dose Naltrexone Counseling- I discussed with the patient the potential risks and side effects of low dose naltrexone including but not limited to: more vivid dreams, headaches, nausea, vomiting, abdominal pain, fatigue, dizziness, and anxiety.
Rifampin Pregnancy And Lactation Text: This medication is Pregnancy Category C and it isn't know if it is safe during pregnancy. It is also excreted in breast milk and should not be used if you are breast feeding.
Topical Clindamycin Counseling: Patient counseled that this medication may cause skin irritation or allergic reactions.  In the event of skin irritation, the patient was advised to reduce the amount of the drug applied or use it less frequently.   The patient verbalized understanding of the proper use and possible adverse effects of clindamycin.  All of the patient's questions and concerns were addressed.
Tranexamic Acid Pregnancy And Lactation Text: It is unknown if this medication is safe during pregnancy or breast feeding.
Prednisone Counseling:  I discussed with the patient the risks of prolonged use of prednisone including but not limited to weight gain, insomnia, osteoporosis, mood changes, diabetes, susceptibility to infection, glaucoma and high blood pressure.  In cases where prednisone use is prolonged, patients should be monitored with blood pressure checks, serum glucose levels and an eye exam.  Additionally, the patient may need to be placed on GI prophylaxis, PCP prophylaxis, and calcium and vitamin D supplementation and/or a bisphosphonate.  The patient verbalized understanding of the proper use and the possible adverse effects of prednisone.  All of the patient's questions and concerns were addressed.
Erythromycin Counseling:  I discussed with the patient the risks of erythromycin including but not limited to GI upset, allergic reaction, drug rash, diarrhea, increase in liver enzymes, and yeast infections.
Drysol Counseling:  I discussed with the patient the risks of drysol/aluminum chloride including but not limited to skin rash, itching, irritation, burning.
Dupixent Counseling: I discussed with the patient the risks of dupilumab including but not limited to eye infection and irritation, cold sores, injection site reactions, worsening of asthma, allergic reactions and increased risk of parasitic infection.  Live vaccines should be avoided while taking dupilumab. Dupilumab will also interact with certain medications such as warfarin and cyclosporine. The patient understands that monitoring is required and they must alert us or the primary physician if symptoms of infection or other concerning signs are noted.
Dutasteride Male Counseling: Dustasteride Counseling:  I discussed with the patient the risks of use of dutasteride including but not limited to decreased libido, decreased ejaculate volume, and gynecomastia. Women who can become pregnant should not handle medication.  All of the patient's questions and concerns were addressed.
Otezla Counseling: The side effects of Otezla were discussed with the patient, including but not limited to worsening or new depression, weight loss, diarrhea, nausea, upper respiratory tract infection, and headache. Patient instructed to call the office should any adverse effect occur.  The patient verbalized understanding of the proper use and possible adverse effects of Otezla.  All the patient's questions and concerns were addressed.
Azithromycin Pregnancy And Lactation Text: This medication is considered safe during pregnancy and is also secreted in breast milk.
Wartpeel Counseling:  I discussed with the patient the risks of Wartpeel including but not limited to erythema, scaling, itching, weeping, crusting, and pain.
Itraconazole Counseling:  I discussed with the patient the risks of itraconazole including but not limited to liver damage, nausea/vomiting, neuropathy, and severe allergy.  The patient understands that this medication is best absorbed when taken with acidic beverages such as non-diet cola or ginger ale.  The patient understands that monitoring is required including baseline LFTs and repeat LFTs at intervals.  The patient understands that they are to contact us or the primary physician if concerning signs are noted.
Rinvoq Pregnancy And Lactation Text: Based on animal studies, Rinvoq may cause embryo-fetal harm when administered to pregnant women.  The medication should not be used in pregnancy.  Breastfeeding is not recommended during treatment and for 6 days after the last dose.
Doxepin Pregnancy And Lactation Text: This medication is Pregnancy Category C and it isn't known if it is safe during pregnancy. It is also excreted in breast milk and breast feeding isn't recommended.
Spironolactone Pregnancy And Lactation Text: This medication can cause feminization of the male fetus and should be avoided during pregnancy. The active metabolite is also found in breast milk.
Benzoyl Peroxide Counseling: Patient counseled that medicine may cause skin irritation and bleach clothing.  In the event of skin irritation, the patient was advised to reduce the amount of the drug applied or use it less frequently.   The patient verbalized understanding of the proper use and possible adverse effects of benzoyl peroxide.  All of the patient's questions and concerns were addressed.
Isotretinoin Counseling: Patient should get monthly blood tests, not donate blood, not drive at night if vision affected, not share medication, and not undergo elective surgery for 6 months after tx completed. Side effects reviewed, pt to contact office should one occur.
Xolair Counseling:  Patient informed of potential adverse effects including but not limited to fever, muscle aches, rash and allergic reactions.  The patient verbalized understanding of the proper use and possible adverse effects of Xolair.  All of the patient's questions and concerns were addressed.
Solaraze Counseling:  I discussed with the patient the risks of Solaraze including but not limited to erythema, scaling, itching, weeping, crusting, and pain.
Dapsone Counseling: I discussed with the patient the risks of dapsone including but not limited to hemolytic anemia, agranulocytosis, rashes, methemoglobinemia, kidney failure, peripheral neuropathy, headaches, GI upset, and liver toxicity.  Patients who start dapsone require monitoring including baseline LFTs and weekly CBCs for the first month, then every month thereafter.  The patient verbalized understanding of the proper use and possible adverse effects of dapsone.  All of the patient's questions and concerns were addressed.
Low Dose Naltrexone Pregnancy And Lactation Text: Naltrexone is pregnancy category C.  There have been no adequate and well-controlled studies in pregnant women.  It should be used in pregnancy only if the potential benefit justifies the potential risk to the fetus.   Limited data indicates that naltrexone is minimally excreted into breastmilk.
Valtrex Counseling: I discussed with the patient the risks of valacyclovir including but not limited to kidney damage, nausea, vomiting and severe allergy.  The patient understands that if the infection seems to be worsening or is not improving, they are to call.
Erythromycin Pregnancy And Lactation Text: This medication is Pregnancy Category B and is considered safe during pregnancy. It is also excreted in breast milk.
Sarecycline Counseling: Patient advised regarding possible photosensitivity and discoloration of the teeth, skin, lips, tongue and gums.  Patient instructed to avoid sunlight, if possible.  When exposed to sunlight, patients should wear protective clothing, sunglasses, and sunscreen.  The patient was instructed to call the office immediately if the following severe adverse effects occur:  hearing changes, easy bruising/bleeding, severe headache, or vision changes.  The patient verbalized understanding of the proper use and possible adverse effects of sarecycline.  All of the patient's questions and concerns were addressed.
Dupixent Pregnancy And Lactation Text: This medication likely crosses the placenta but the risk for the fetus is uncertain. This medication is excreted in breast milk.
Klisyri Counseling:  I discussed with the patient the risks of Klisyri including but not limited to erythema, scaling, itching, weeping, crusting, and pain.
Bactrim Counseling:  I discussed with the patient the risks of sulfa antibiotics including but not limited to GI upset, allergic reaction, drug rash, diarrhea, dizziness, photosensitivity, and yeast infections.  Rarely, more serious reactions can occur including but not limited to aplastic anemia, agranulocytosis, methemoglobinemia, blood dyscrasias, liver or kidney failure, lung infiltrates or desquamative/blistering drug rashes.
Infliximab Counseling:  I discussed with the patient the risks of infliximab including but not limited to myelosuppression, immunosuppression, autoimmune hepatitis, demyelinating diseases, lymphoma, and serious infections.  The patient understands that monitoring is required including a PPD at baseline and must alert us or the primary physician if symptoms of infection or other concerning signs are noted.

## 2024-10-15 NOTE — PROCEDURE: OTHER
Note Text (......Xxx Chief Complaint.): This diagnosis correlates with the
Detail Level: Zone
Render Risk Assessment In Note?: no
Other (Free Text): Due to location (underarm) of rash at this visit, will hold off on biopsy. Patient to return in 1 month to f/u with update from new treatment regimen. If no improvement, punch biopsy to be conducted

## 2024-10-15 NOTE — PROCEDURE: ORDER TESTS
Billing Type: Third-Party Bill
Performing Laboratory: [id number]
Expected Date Of Service: 10/15/2024
Bill For Surgical Tray: no

## 2024-10-15 NOTE — PROCEDURE: PRESCRIPTION MEDICATION MANAGEMENT
Initiate Treatment: Patient may mix two creams and apply together:\\nclindamycin 1 % lotion: Apply to affected area of rash twice daily for 2 weeks\\neconazole 1 % topical cream: Apply to affected areas of underarms twice daily for two weeks
Detail Level: Simple
Continue Regimen: Triamcinolone: apply to affected areas of rash twice daily for up to two weeks if needed for itch
Render In Strict Bullet Format?: No
Plan: NOTE: rash has NOT been improving with just Triamcinolone

## 2024-10-15 NOTE — PROCEDURE: ADDITIONAL NOTES
Additional Notes: Discussed ILK injection + at-home warm compress vs I&D
Render Risk Assessment In Note?: no
Detail Level: Simple
Additional Notes: Recommended Cetaphil Deep Cleansing Bar\\n\\nRecommended Tylenol and no Advil

## 2024-10-15 NOTE — HPI: RASH
Is This A New Presentation, Or A Follow-Up?: Rash
Additional History: Patient is presenting for potential skin biopsy of rash similar to the one spotted at [facility].

## 2024-10-21 ENCOUNTER — RX ONLY (RX ONLY)
Age: 29
End: 2024-10-21

## 2024-10-21 RX ORDER — DOXYCYCLINE HYCLATE 100 MG/1
CAPSULE, GELATIN COATED ORAL
Qty: 28 | Refills: 0 | Status: ERX | COMMUNITY
Start: 2024-10-21

## 2024-11-01 ENCOUNTER — TELEPHONE ENCOUNTER (OUTPATIENT)
Dept: URBAN - METROPOLITAN AREA CLINIC 92 | Facility: CLINIC | Age: 29
End: 2024-11-01

## 2024-11-01 ENCOUNTER — OFFICE VISIT (OUTPATIENT)
Dept: URBAN - METROPOLITAN AREA TELEHEALTH 2 | Facility: TELEHEALTH | Age: 29
End: 2024-11-01
Payer: COMMERCIAL

## 2024-11-01 VITALS — HEIGHT: 62 IN | WEIGHT: 113 LBS | BODY MASS INDEX: 20.8 KG/M2

## 2024-11-01 DIAGNOSIS — Z71.85 VACCINE COUNSELING: ICD-10-CM

## 2024-11-01 DIAGNOSIS — R10.84 GENERALIZED ABDOMINAL PAIN: ICD-10-CM

## 2024-11-01 DIAGNOSIS — R19.7 ACUTE DIARRHEA: ICD-10-CM

## 2024-11-01 DIAGNOSIS — R11.2 NAUSEA AND VOMITING, UNSPECIFIED VOMITING TYPE: ICD-10-CM

## 2024-11-01 DIAGNOSIS — D50.0 IRON DEFICIENCY ANEMIA DUE TO CHRONIC BLOOD LOSS: ICD-10-CM

## 2024-11-01 DIAGNOSIS — E55.9 VITAMIN D DEFICIENCY: ICD-10-CM

## 2024-11-01 DIAGNOSIS — K50.80 CROHN'S DISEASE OF BOTH SMALL AND LARGE INTESTINE WITHOUT COMPLICATION: ICD-10-CM

## 2024-11-01 DIAGNOSIS — R74.8 ELEVATED LIVER ENZYMES: ICD-10-CM

## 2024-11-01 DIAGNOSIS — A04.72 CLOSTRIDIUM DIFFICILE COLITIS: ICD-10-CM

## 2024-11-01 DIAGNOSIS — M19.90 ARTHRITIS: ICD-10-CM

## 2024-11-01 PROCEDURE — 99215 OFFICE O/P EST HI 40 MIN: CPT | Performed by: PHYSICIAN ASSISTANT

## 2024-11-01 RX ORDER — PREDNISONE 10 MG/1
TAKE 4 TABLETS (40MG TOTAL) BY MOUTH IN THE MORNING FOR 5 DAYS TABLET ORAL
Qty: 20 EACH | Refills: 0 | Status: ON HOLD | COMMUNITY

## 2024-11-01 RX ORDER — UPADACITINIB 30 MG/1
AS DIRECTED TABLET, EXTENDED RELEASE ORAL ONCE A DAY
Qty: 84 | Refills: 0 | Status: ACTIVE | COMMUNITY

## 2024-11-01 RX ORDER — INFLIXIMAB 100 MG/10ML
AS DIRECTED INJECTION, POWDER, LYOPHILIZED, FOR SOLUTION INTRAVENOUS
OUTPATIENT
Start: 2024-11-01

## 2024-11-01 RX ORDER — LEVONORGESTREL/ETHINYL ESTRADIOL AND ETHINYL ESTRADIOL 100-20(84)
KIT ORAL
Qty: 91 TABLET | Status: ACTIVE | COMMUNITY

## 2024-11-01 RX ORDER — OMEPRAZOLE 40 MG/1
1 CAPSULE 30 MINUTES BEFORE MORNING MEAL CAPSULE, DELAYED RELEASE ORAL ONCE A DAY
Qty: 90 | Refills: 3 | Status: ON HOLD | COMMUNITY

## 2024-11-01 RX ORDER — ZOSTER VACCINE RECOMBINANT, ADJUVANTED 50 MCG/0.5
AS DIRECTED KIT INTRAMUSCULAR
Qty: 0.5 ML | Refills: 1 | Status: ACTIVE | COMMUNITY

## 2024-11-01 RX ORDER — LACTOBACIL 2/BIFIDO 1/S.THERMO 450B CELL
2 CAPSULE PACKET (EA) ORAL ONCE A DAY
Qty: 60 CAPSULE | Refills: 5 | OUTPATIENT

## 2024-11-01 RX ORDER — OXYCODONE HYDROCHLORIDE AND ACETAMINOPHEN 5; 325 MG/1; MG/1
TAKE 1 TABLET BY MOUTH EVERY 6 HOURS AS NEEDED FOR PAIN TABLET ORAL
Qty: 12 EACH | Refills: 0 | Status: ON HOLD | COMMUNITY

## 2024-11-01 RX ORDER — ZOSTER VACCINE RECOMBINANT, ADJUVANTED 50 MCG/0.5
AS DIRECTED KIT INTRAMUSCULAR
Qty: 0.5 ML | Refills: 1 | OUTPATIENT

## 2024-11-01 RX ORDER — LACTOBACIL 2/BIFIDO 1/S.THERMO 450B CELL
2 CAPSULE PACKET (EA) ORAL ONCE A DAY
Qty: 60 CAPSULE | Refills: 5 | Status: ACTIVE | COMMUNITY
Start: 2024-09-19 | End: 2025-03-18

## 2024-11-01 NOTE — HPI-OTHER HISTORIES
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  Pouchoscopy 3/2021 moderate pouchitis and mild erythema of shun-TI, bx of TI chronic active ileitis with apthous ulcer c/w crohns and pouch w chronic active ileitis Pouchoscopy 3/11/2022 mild inflammation of pouch with normal shun-TI, bx  w/ chronic active pouchitis Pouchoscopy 5/12/2023 showed moderate pouchitis but normal shun-TI, bx + active inflammation/ulceration She was initiated on Rinvoq 45 in Aug 2023 and did well for months but had a lapse in meds from Oct-mid Nov 2' insurance and she presented to the ED in Nov 2023  and CT revealed distended residual rectum, no evidence of obstruction. Prior CT scan on 10/10/23 revealed colectomy with J pouch, distal small bowel with mild wall thickening to the level of anastomosis suggesting mild uncomplicated enteritis. C diff resulted + s/p dific s/p dificid and did well from november to march and then had recurrent N/V/D. ER at [facility] and CT w/ No acute abdominopelvic abnormality. Unchanged postoperative changes of colectomy with ileoanal anastomosis and patulous segment of bowel at the anastomosis. EGD was normal, bx neg. C Diff +, tx with dificid again and was back in clinical remission and calpro as below but then presented back in early Aug with 8-10 looser BMs/day and C diff + given 25 day taper which she completed 9/6 with good response but then within a week noted diarrhea, N/V. She was seen at [facility] ER. CT Shun colon shows a focal segment of inflammation left of midline in the pelvis. This does not appear to be causing obstruction at this time. ALT 63 AST                           42 . Hg 16.4 Hct 49. Stool studies neg including c diff. She was given IVF and continued Rinvoq + Injectafer X 2 in May.  Pouchoscopy 10/4/2024: Moderate inflammation was found in the ileum. Benign polyp in the ileoanal pouch, Erythematous mucosa in the shun-terminal ileum. Biopsies showed Chronic active ileitis with ulceration and J-pouch w/ focal active inflammation and ulceration.No granulomata or dysplasia. +Rashes on legs, saw derm and dx with staph s/p I&D and on doxy with probiotics.  Having BMs 10/day, all loose. No hematochezia. Pain currently controlled. Continues to have joint pains--hips, legs, back. No N/V, fevers She got shingrex X 2

## 2024-11-06 ENCOUNTER — LAB OUTSIDE AN ENCOUNTER (OUTPATIENT)
Dept: URBAN - METROPOLITAN AREA CLINIC 80 | Facility: CLINIC | Age: 29
End: 2024-11-06

## 2024-11-09 LAB
MITOGEN-NIL: 6.75
QUANTIFERON NIL VALUE: 0.02
QUANTIFERON TB1 AG VALUE: 0
QUANTIFERON TB2 AG VALUE: 0
QUANTIFERON-TB GOLD PLUS: NEGATIVE

## 2024-11-14 ENCOUNTER — TELEPHONE ENCOUNTER (OUTPATIENT)
Dept: URBAN - METROPOLITAN AREA CLINIC 92 | Facility: CLINIC | Age: 29
End: 2024-11-14

## 2024-11-14 LAB
C-REACTIVE PROTEIN, QUANT: <3
HEPATITIS B CORE AB TOTAL: (no result)
HEPATITIS B SURFACE AB IMMUNITY, QN: 174
HEPATITIS B SURFACE ANTIGEN: (no result)
INTERPRETATION: (no result)
Lab: NEGATIVE
SED RATE BY MODIFIED: 25
VITAMIN D,25-OH,TOTAL,IA: 23

## 2024-11-15 ENCOUNTER — APPOINTMENT (OUTPATIENT)
Dept: URBAN - METROPOLITAN AREA CLINIC 207 | Age: 29
Setting detail: DERMATOLOGY
End: 2024-11-20

## 2024-11-15 DIAGNOSIS — L30.9 DERMATITIS, UNSPECIFIED: ICD-10-CM

## 2024-11-15 LAB — CALPROTECTIN, FECAL: 143

## 2024-11-15 PROCEDURE — 99214 OFFICE O/P EST MOD 30 MIN: CPT

## 2024-11-15 PROCEDURE — OTHER MIPS QUALITY: OTHER

## 2024-11-15 PROCEDURE — OTHER PRESCRIPTION: OTHER

## 2024-11-15 PROCEDURE — OTHER MEDICATION COUNSELING: OTHER

## 2024-11-15 PROCEDURE — OTHER PATIENT SPECIFIC COUNSELING: OTHER

## 2024-11-15 PROCEDURE — OTHER PRESCRIPTION MEDICATION MANAGEMENT: OTHER

## 2024-11-15 PROCEDURE — OTHER COUNSELING: OTHER

## 2024-11-15 PROCEDURE — OTHER ADDITIONAL NOTES: OTHER

## 2024-11-15 RX ORDER — TAPINAROF 10 MG/1000MG
CREAM TOPICAL
Qty: 60 | Refills: 2 | Status: ERX | COMMUNITY
Start: 2024-11-15

## 2024-11-15 ASSESSMENT — LOCATION SIMPLE DESCRIPTION DERM
LOCATION SIMPLE: RIGHT AXILLARY VAULT
LOCATION SIMPLE: LEFT ANTERIOR AXILLA

## 2024-11-15 ASSESSMENT — LOCATION DETAILED DESCRIPTION DERM
LOCATION DETAILED: LEFT ANTERIOR AXILLA
LOCATION DETAILED: RIGHT AXILLARY VAULT

## 2024-11-15 ASSESSMENT — LOCATION ZONE DERM: LOCATION ZONE: AXILLAE

## 2024-11-15 NOTE — PROCEDURE: MEDICATION COUNSELING
Adbry Counseling: I discussed with the patient the risks of tralokinumab including but not limited to eye infection and irritation, cold sores, injection site reactions, worsening of asthma, allergic reactions and increased risk of parasitic infection.  Live vaccines should be avoided while taking tralokinumab. The patient understands that monitoring is required and they must alert us or the primary physician if symptoms of infection or other concerning signs are noted.
Xolair Pregnancy And Lactation Text: This medication is Pregnancy Category B and is considered safe during pregnancy. This medication is excreted in breast milk.
Otezla Pregnancy And Lactation Text: This medication is Pregnancy Category C and it isn't known if it is safe during pregnancy. It is unknown if it is excreted in breast milk.
Sotyktu Counseling:  I discussed the most common side effects of Sotyktu including: common cold, sore throat, sinus infections, cold sores, canker sores, folliculitis, and acne.? I also discussed more serious side effects of Sotyktu including but not limited to: serious allergic reactions; increased risk for infections such as TB; cancers such as lymphomas; rhabdomyolysis and elevated CPK; and elevated triglycerides and liver enzymes.?
Wartpeel Pregnancy And Lactation Text: This medication is Pregnancy Category X and contraindicated in pregnancy and in women who may become pregnant. It is unknown if this medication is excreted in breast milk.
Dutasteride Female Counseling: Dutasteride Counseling:  I discussed with the patient the risks of use of dutasteride including but not limited to decreased libido and sexual dysfunction. Explained the teratogenic nature of the medication and stressed the importance of not getting pregnant during treatment. All of the patient's questions and concerns were addressed.
Hydroxyzine Counseling: Patient advised that the medication is sedating and not to drive a car after taking this medication.  Patient informed of potential adverse effects including but not limited to dry mouth, urinary retention, and blurry vision.  The patient verbalized understanding of the proper use and possible adverse effects of hydroxyzine.  All of the patient's questions and concerns were addressed.
Benzoyl Peroxide Pregnancy And Lactation Text: This medication is Pregnancy Category C. It is unknown if benzoyl peroxide is excreted in breast milk.
Sarecycline Pregnancy And Lactation Text: This medication is Pregnancy Category D and not consider safe during pregnancy. It is also excreted in breast milk.
Topical Ketoconazole Counseling: Patient counseled that this medication may cause skin irritation or allergic reactions.  In the event of skin irritation, the patient was advised to reduce the amount of the drug applied or use it less frequently.   The patient verbalized understanding of the proper use and possible adverse effects of ketoconazole.  All of the patient's questions and concerns were addressed.
Niacinamide Counseling: I recommended taking niacin or niacinamide, also know as vitamin B3, twice daily. Recent evidence suggests that taking vitamin B3 (500 mg twice daily) can reduce the risk of actinic keratoses and non-melanoma skin cancers. Side effects of vitamin B3 include flushing and headache.
Isotretinoin Pregnancy And Lactation Text: This medication is Pregnancy Category X and is considered extremely dangerous during pregnancy. It is unknown if it is excreted in breast milk.
Protopic Counseling: Patient may experience a mild burning sensation during topical application. Protopic is not approved in children less than 2 years of age. There have been case reports of hematologic and skin malignancies in patients using topical calcineurin inhibitors although causality is questionable.
Opioid Counseling: I discussed with the patient the potential side effects of opioids including but not limited to addiction, altered mental status, and depression. I stressed avoiding alcohol, benzodiazepines, muscle relaxants and sleep aids unless specifically okayed by a physician. The patient verbalized understanding of the proper use and possible adverse effects of opioids. All of the patient's questions and concerns were addressed. They were instructed to flush the remaining pills down the toilet if they did not need them for pain.
Azathioprine Counseling:  I discussed with the patient the risks of azathioprine including but not limited to myelosuppression, immunosuppression, hepatotoxicity, lymphoma, and infections.  The patient understands that monitoring is required including baseline LFTs, Creatinine, possible TPMP genotyping and weekly CBCs for the first month and then every 2 weeks thereafter.  The patient verbalized understanding of the proper use and possible adverse effects of azathioprine.  All of the patient's questions and concerns were addressed.
Spevigo Counseling: I discussed with the patient the risks of Spevigo including but not limited to fatigue, nasuea, vomiting, headache, pruritus, urinary tract infection, an infusion related reactions.  The patient understands that monitoring is required including screening for tuberculosis at baseline and yearly screening thereafter while continuing Spevigo therapy. They should contact us if symptoms of infection or other concerning signs are noted.
Infliximab Pregnancy And Lactation Text: This medication is Pregnancy Category B and is considered safe during pregnancy. It is unknown if this medication is excreted in breast milk.
Klisyri Pregnancy And Lactation Text: It is unknown if this medication can harm a developing fetus or if it is excreted in breast milk.
Ketoconazole Counseling:   Patient counseled regarding improving absorption with orange juice.  Adverse effects include but are not limited to breast enlargement, headache, diarrhea, nausea, upset stomach, liver function test abnormalities, taste disturbance, and stomach pain.  There is a rare possibility of liver failure that can occur when taking ketoconazole. The patient understands that monitoring of LFTs may be required, especially at baseline. The patient verbalized understanding of the proper use and possible adverse effects of ketoconazole.  All of the patient's questions and concerns were addressed.
Valtrex Pregnancy And Lactation Text: this medication is Pregnancy Category B and is considered safe during pregnancy. This medication is not directly found in breast milk but it's metabolite acyclovir is present.
Dapsone Pregnancy And Lactation Text: This medication is Pregnancy Category C and is not considered safe during pregnancy or breast feeding.
Bactrim Pregnancy And Lactation Text: This medication is Pregnancy Category D and is known to cause fetal risk.  It is also excreted in breast milk.
Solaraze Pregnancy And Lactation Text: This medication is Pregnancy Category B and is considered safe. There is some data to suggest avoiding during the third trimester. It is unknown if this medication is excreted in breast milk.
Metronidazole Counseling:  I discussed with the patient the risks of metronidazole including but not limited to seizures, nausea/vomiting, a metallic taste in the mouth, nausea/vomiting and severe allergy.
Sotyktu Pregnancy And Lactation Text: There is insufficient data to evaluate whether or not Sotyktu is safe to use during pregnancy.? ?It is not known if Sotyktu passes into breast milk and whether or not it is safe to use when breastfeeding.??
Hydroxyzine Pregnancy And Lactation Text: This medication is not safe during pregnancy and should not be taken. It is also excreted in breast milk and breast feeding isn't recommended.
Carac Counseling:  I discussed with the patient the risks of Carac including but not limited to erythema, scaling, itching, weeping, crusting, and pain.
Adbry Pregnancy And Lactation Text: It is unknown if this medication will adversely affect pregnancy or breast feeding.
Dutasteride Pregnancy And Lactation Text: This medication is absolutely contraindicated in women, especially during pregnancy and breast feeding. Feminization of male fetuses is possible if taking while pregnant.
Elidel Counseling: Patient may experience a mild burning sensation during topical application. Elidel is not approved in children less than 2 years of age. There have been case reports of hematologic and skin malignancies in patients using topical calcineurin inhibitors although causality is questionable.
Enbrel Counseling:  I discussed with the patient the risks of etanercept including but not limited to myelosuppression, immunosuppression, autoimmune hepatitis, demyelinating diseases, lymphoma, and infections.  The patient understands that monitoring is required including a PPD at baseline and must alert us or the primary physician if symptoms of infection or other concerning signs are noted.
High Dose Vitamin A Counseling: Side effects reviewed, pt to contact office should one occur.
Niacinamide Pregnancy And Lactation Text: These medications are considered safe during pregnancy.
Tetracycline Counseling: Patient counseled regarding possible photosensitivity and increased risk for sunburn.  Patient instructed to avoid sunlight, if possible.  When exposed to sunlight, patients should wear protective clothing, sunglasses, and sunscreen.  The patient was instructed to call the office immediately if the following severe adverse effects occur:  hearing changes, easy bruising/bleeding, severe headache, or vision changes.  The patient verbalized understanding of the proper use and possible adverse effects of tetracycline.  All of the patient's questions and concerns were addressed. Patient understands to avoid pregnancy while on therapy due to potential birth defects.
Cibinqo Counseling: I discussed with the patient the risks of Cibinqo therapy including but not limited to common cold, nausea, headache, cold sores, increased blood CPK levels, dizziness, UTIs, fatigue, acne, and vomitting. Live vaccines should be avoided.  This medication has been linked to serious infections; higher rate of mortality; malignancy and lymphoproliferative disorders; major adverse cardiovascular events; thrombosis; thrombocytopenia and lymphopenia; lipid elevations; and retinal detachment.
Topical Ketoconazole Pregnancy And Lactation Text: This medication is Pregnancy Category B and is considered safe during pregnancy. It is unknown if it is excreted in breast milk.
Oxybutynin Counseling:  I discussed with the patient the risks of oxybutynin including but not limited to skin rash, drowsiness, dry mouth, difficulty urinating, and blurred vision.
Winlevi Counseling:  I discussed with the patient the risks of topical clascoterone including but not limited to erythema, scaling, itching, and stinging. Patient voiced their understanding.
Azathioprine Pregnancy And Lactation Text: This medication is Pregnancy Category D and isn't considered safe during pregnancy. It is unknown if this medication is excreted in breast milk.
Metronidazole Pregnancy And Lactation Text: This medication is Pregnancy Category B and considered safe during pregnancy.  It is also excreted in breast milk.
Gabapentin Counseling: I discussed with the patient the risks of gabapentin including but not limited to dizziness, somnolence, fatigue and ataxia.
Minoxidil Counseling: Minoxidil is a topical medication which can increase blood flow where it is applied. It is uncertain how this medication increases hair growth. Side effects are uncommon and include stinging and allergic reactions.
Rituxan Counseling:  I discussed with the patient the risks of Rituxan infusions. Side effects can include infusion reactions, severe drug rashes including mucocutaneous reactions, reactivation of latent hepatitis and other infections and rarely progressive multifocal leukoencephalopathy.  All of the patient's questions and concerns were addressed.
Ketoconazole Pregnancy And Lactation Text: This medication is Pregnancy Category C and it isn't know if it is safe during pregnancy. It is also excreted in breast milk and breast feeding isn't recommended.
Protopic Pregnancy And Lactation Text: This medication is Pregnancy Category C. It is unknown if this medication is excreted in breast milk when applied topically.
Soolantra Counseling: I discussed with the patients the risks of topial Soolantra. This is a medicine which decreases the number of mites and inflammation in the skin. You experience burning, stinging, eye irritation or allergic reactions.  Please call our office if you develop any problems from using this medication.
Use Enhanced Medication Counseling?: No
Spevigo Pregnancy And Lactation Text: The risk during pregnancy and breastfeeding is uncertain with this medication. This medication does cross the placenta. It is unknown if this medication is found in breast milk.
Opioid Pregnancy And Lactation Text: These medications can lead to premature delivery and should be avoided during pregnancy. These medications are also present in breast milk in small amounts.
Cephalexin Counseling: I counseled the patient regarding use of cephalexin as an antibiotic for prophylactic and/or therapeutic purposes. Cephalexin (commonly prescribed under brand name Keflex) is a cephalosporin antibiotic which is active against numerous classes of bacteria, including most skin bacteria. Side effects may include nausea, diarrhea, gastrointestinal upset, rash, hives, yeast infections, and in rare cases, hepatitis, kidney disease, seizures, fever, confusion, neurologic symptoms, and others. Patients with severe allergies to penicillin medications are cautioned that there is about a 10% incidence of cross-reactivity with cephalosporins. When possible, patients with penicillin allergies should use alternatives to cephalosporins for antibiotic therapy.
Finasteride Male Counseling: Finasteride Counseling:  I discussed with the patient the risks of use of finasteride including but not limited to decreased libido, decreased ejaculate volume, gynecomastia, and depression. Women should not handle medication.  All of the patient's questions and concerns were addressed.
Erivedge Counseling- I discussed with the patient the risks of Erivedge including but not limited to nausea, vomiting, diarrhea, constipation, weight loss, changes in the sense of taste, decreased appetite, muscle spasms, and hair loss.  The patient verbalized understanding of the proper use and possible adverse effects of Erivedge.  All of the patient's questions and concerns were addressed.
Elidel Pregnancy And Lactation Text: This medication is Pregnancy Category C. It is unknown if this medication is excreted in breast milk.
Xeljanz Counseling: I discussed with the patient the risks of Xeljanz therapy including increased risk of infection, liver issues, headache, diarrhea, or cold symptoms. Live vaccines should be avoided. They were instructed to call if they have any problems.
Winlevi Pregnancy And Lactation Text: This medication is considered safe during pregnancy and breastfeeding.
Cellcept Counseling:  I discussed with the patient the risks of mycophenolate mofetil including but not limited to infection/immunosuppression, GI upset, hypokalemia, hypercholesterolemia, bone marrow suppression, lymphoproliferative disorders, malignancy, GI ulceration/bleed/perforation, colitis, interstitial lung disease, kidney failure, progressive multifocal leukoencephalopathy, and birth defects.  The patient understands that monitoring is required including a baseline creatinine and regular CBC testing. In addition, patient must alert us immediately if symptoms of infection or other concerning signs are noted.
Bimzelx Counseling:  I discussed with the patient the risks of Bimzelx including but not limited to depression, immunosuppression, allergic reactions and infections.  The patient understands that monitoring is required including a PPD at baseline and must alert us or the primary physician if symptoms of infection or other concerning signs are noted.
Qbrexza Counseling:  I discussed with the patient the risks of Qbrexza including but not limited to headache, mydriasis, blurred vision, dry eyes, nasal dryness, dry mouth, dry throat, dry skin, urinary hesitation, and constipation.  Local skin reactions including erythema, burning, stinging, and itching can also occur.
Stelara Counseling:  I discussed with the patient the risks of ustekinumab including but not limited to immunosuppression, malignancy, posterior leukoencephalopathy syndrome, and serious infections.  The patient understands that monitoring is required including a PPD at baseline and must alert us or the primary physician if symptoms of infection or other concerning signs are noted.
Albendazole Counseling:  I discussed with the patient the risks of albendazole including but not limited to cytopenia, kidney damage, nausea/vomiting and severe allergy.  The patient understands that this medication is being used in an off-label manner.
Soolantra Pregnancy And Lactation Text: This medication is Pregnancy Category C. This medication is considered safe during breast feeding.
Cibinqo Pregnancy And Lactation Text: It is unknown if this medication will adversely affect pregnancy or breast feeding.  You should not take this medication if you are currently pregnant or planning a pregnancy or while breastfeeding.
Topical Metronidazole Counseling: Metronidazole is a topical antibiotic medication. You may experience burning, stinging, redness, or allergic reactions.  Please call our office if you develop any problems from using this medication.
Nsaids Counseling: NSAID Counseling: I discussed with the patient that NSAIDs should be taken with food. Prolonged use of NSAIDs can result in the development of stomach ulcers.  Patient advised to stop taking NSAIDs if abdominal pain occurs.  The patient verbalized understanding of the proper use and possible adverse effects of NSAIDs.  All of the patient's questions and concerns were addressed.
High Dose Vitamin A Pregnancy And Lactation Text: High dose vitamin A therapy is contraindicated during pregnancy and breast feeding.
Erivedge Pregnancy And Lactation Text: This medication is Pregnancy Category X and is absolutely contraindicated during pregnancy. It is unknown if it is excreted in breast milk.
Minocycline Counseling: Patient advised regarding possible photosensitivity and discoloration of the teeth, skin, lips, tongue and gums.  Patient instructed to avoid sunlight, if possible.  When exposed to sunlight, patients should wear protective clothing, sunglasses, and sunscreen.  The patient was instructed to call the office immediately if the following severe adverse effects occur:  hearing changes, easy bruising/bleeding, severe headache, or vision changes.  The patient verbalized understanding of the proper use and possible adverse effects of minocycline.  All of the patient's questions and concerns were addressed.
Gabapentin Pregnancy And Lactation Text: This medication is Pregnancy Category C and isn't considered safe during pregnancy. It is excreted in breast milk.
Eucrisa Counseling: Patient may experience a mild burning sensation during topical application. Eucrisa is not approved in children less than 2 years of age.
Humira Counseling:  I discussed with the patient the risks of adalimumab including but not limited to myelosuppression, immunosuppression, autoimmune hepatitis, demyelinating diseases, lymphoma, and serious infections.  The patient understands that monitoring is required including a PPD at baseline and must alert us or the primary physician if symptoms of infection or other concerning signs are noted.
Minoxidil Pregnancy And Lactation Text: This medication has not been assigned a Pregnancy Risk Category but animal studies failed to show danger with the topical medication. It is unknown if the medication is excreted in breast milk.
Terbinafine Counseling: Patient counseling regarding adverse effects of terbinafine including but not limited to headache, diarrhea, rash, upset stomach, liver function test abnormalities, itching, taste/smell disturbance, nausea, abdominal pain, and flatulence.  There is a rare possibility of liver failure that can occur when taking terbinafine.  The patient understands that a baseline LFT and kidney function test may be required. The patient verbalized understanding of the proper use and possible adverse effects of terbinafine.  All of the patient's questions and concerns were addressed.
Rituxan Pregnancy And Lactation Text: This medication is Pregnancy Category C and it isn't know if it is safe during pregnancy. It is unknown if this medication is excreted in breast milk but similar antibodies are known to be excreted.
Propranolol Counseling:  I discussed with the patient the risks of propranolol including but not limited to low heart rate, low blood pressure, low blood sugar, restlessness and increased cold sensitivity. They should call the office if they experience any of these side effects.
Cephalexin Pregnancy And Lactation Text: This medication is Pregnancy Category B and considered safe during pregnancy.  It is also excreted in breast milk but can be used safely for shorter doses.
Detail Level: Simple
VTAMA Counseling: I discussed with the patient that VTAMA is not for use in the eyes, mouth or mouth. They should call the office if they develop any signs of allergic reactions to VTAMA. The patient verbalized understanding of the proper use and possible adverse effects of VTAMA.  All of the patient's questions and concerns were addressed.
Finasteride Female Counseling: Finasteride Counseling:  I discussed with the patient the risks of use of finasteride including but not limited to decreased libido and sexual dysfunction. Explained the teratogenic nature of the medication and stressed the importance of not getting pregnant during treatment. All of the patient's questions and concerns were addressed.
Arava Counseling:  Patient counseled regarding adverse effects of Arava including but not limited to nausea, vomiting, abnormalities in liver function tests. Patients may develop mouth sores, rash, diarrhea, and abnormalities in blood counts. The patient understands that monitoring is required including LFTs and blood counts.  There is a rare possibility of scarring of the liver and lung problems that can occur when taking methotrexate. Persistent nausea, loss of appetite, pale stools, dark urine, cough, and shortness of breath should be reported immediately. Patient advised to discontinue Arava treatment and consult with a physician prior to attempting conception. The patient will have to undergo a treatment to eliminate Arava from the body prior to conception.
Xelkirillz Pregnancy And Lactation Text: This medication is Pregnancy Category D and is not considered safe during pregnancy.  The risk during breast feeding is also uncertain.
Litfulo Counseling: I discussed with the patient the risks of Litfulo therapy including but not limited to upper respiratory tract infections, shingles, cold sores, and nausea. Live vaccines should be avoided.  This medication has been linked to serious infections; higher rate of mortality; malignancy and lymphoproliferative disorders; major adverse cardiovascular events; thrombosis; gastrointestinal perforations; neutropenia; lymphopenia; anemia; liver enzyme elevations; and lipid elevations.
Qbrexza Pregnancy And Lactation Text: There is no available data on Qbrexza use in pregnant women.  There is no available data on Qbrexza use in lactation.
Albendazole Pregnancy And Lactation Text: This medication is Pregnancy Category C and it isn't known if it is safe during pregnancy. It is also excreted in breast milk.
Calcipotriene Counseling:  I discussed with the patient the risks of calcipotriene including but not limited to erythema, scaling, itching, and irritation.
Bimzelx Pregnancy And Lactation Text: This medication crosses the placenta and the safety is uncertain during pregnancy. It is unknown if this medication is present in breast milk.
Topical Metronidazole Pregnancy And Lactation Text: This medication is Pregnancy Category B and considered safe during pregnancy.  It is also considered safe to use while breastfeeding.
Siliq Counseling:  I discussed with the patient the risks of Siliq including but not limited to new or worsening depression, suicidal thoughts and behavior, immunosuppression, malignancy, posterior leukoencephalopathy syndrome, and serious infections.  The patient understands that monitoring is required including a PPD at baseline and must alert us or the primary physician if symptoms of infection or other concerning signs are noted. There is also a special program designed to monitor depression which is required with Siliq.
Topical Retinoid counseling:  Patient advised to apply a pea-sized amount only at bedtime and wait 30 minutes after washing their face before applying.  If too drying, patient may add a non-comedogenic moisturizer. The patient verbalized understanding of the proper use and possible adverse effects of retinoids.  All of the patient's questions and concerns were addressed.
Glycopyrrolate Counseling:  I discussed with the patient the risks of glycopyrrolate including but not limited to skin rash, drowsiness, dry mouth, difficulty urinating, and blurred vision.
Nsaids Pregnancy And Lactation Text: These medications are considered safe up to 30 weeks gestation. It is excreted in breast milk.
Mirvaso Counseling: Mirvaso is a topical medication which can decrease superficial blood flow where applied. Side effects are uncommon and include stinging, redness and allergic reactions.
Fluconazole Counseling:  Patient counseled regarding adverse effects of fluconazole including but not limited to headache, diarrhea, nausea, upset stomach, liver function test abnormalities, taste disturbance, and stomach pain.  There is a rare possibility of liver failure that can occur when taking fluconazole.  The patient understands that monitoring of LFTs and kidney function test may be required, especially at baseline. The patient verbalized understanding of the proper use and possible adverse effects of fluconazole.  All of the patient's questions and concerns were addressed.
Libtayo Counseling- I discussed with the patient the risks of Libtayo including but not limited to nausea, vomiting, diarrhea, and bone or muscle pain.  The patient verbalized understanding of the proper use and possible adverse effects of Libtayo.  All of the patient's questions and concerns were addressed.
Terbinafine Pregnancy And Lactation Text: This medication is Pregnancy Category B and is considered safe during pregnancy. It is also excreted in breast milk and breast feeding isn't recommended.
Clindamycin Counseling: I counseled the patient regarding use of clindamycin as an antibiotic for prophylactic and/or therapeutic purposes. Clindamycin is active against numerous classes of bacteria, including skin bacteria. Side effects may include nausea, diarrhea, gastrointestinal upset, rash, hives, yeast infections, and in rare cases, colitis.
Sski Pregnancy And Lactation Text: This medication is Pregnancy Category D and isn't considered safe during pregnancy. It is excreted in breast milk.
Calcipotriene Pregnancy And Lactation Text: The use of this medication during pregnancy or lactation is not recommended as there is insufficient data.
Cimzia Counseling:  I discussed with the patient the risks of Cimzia including but not limited to immunosuppression, allergic reactions and infections.  The patient understands that monitoring is required including a PPD at baseline and must alert us or the primary physician if symptoms of infection or other concerning signs are noted.
Topical Steroids Counseling: I discussed with the patient that prolonged use of topical steroids can result in the increased appearance of superficial blood vessels (telangiectasias), lightening (hypopigmentation) and thinning of the skin (atrophy).  Patient understands to avoid using high potency steroids in skin folds, the groin or the face.  The patient verbalized understanding of the proper use and possible adverse effects of topical steroids.  All of the patient's questions and concerns were addressed.
Vtama Pregnancy And Lactation Text: It is unknown if this medication can cause problems during pregnancy and breastfeeding.
Finasteride Pregnancy And Lactation Text: This medication is absolutely contraindicated during pregnancy. It is unknown if it is excreted in breast milk.
Litfulo Pregnancy And Lactation Text: Based on animal studies, Lifulo may cause embryo-fetal harm when administered to pregnant women.  The medication should not be used in pregnancy.  Breastfeeding is not recommended during treatment.
Propranolol Pregnancy And Lactation Text: This medication is Pregnancy Category C and it isn't known if it is safe during pregnancy. It is excreted in breast milk.
Siliq Pregnancy And Lactation Text: The risk during pregnancy and breastfeeding is uncertain with this medication.
Aklief counseling:  Patient advised to apply a pea-sized amount only at bedtime and wait 30 minutes after washing their face before applying.  If too drying, patient may add a non-comedogenic moisturizer.  The most commonly reported side effects including irritation, redness, scaling, dryness, stinging, burning, itching, and increased risk of sunburn.  The patient verbalized understanding of the proper use and possible adverse effects of retinoids.  All of the patient's questions and concerns were addressed.
Olanzapine Counseling- I discussed with the patient the common side effects of olanzapine including but are not limited to: lack of energy, dry mouth, increased appetite, sleepiness, tremor, constipation, dizziness, changes in behavior, or restlessness.  Explained that teenagers are more likely to experience headaches, abdominal pain, pain in the arms or legs, tiredness, and sleepiness.  Serious side effects include but are not limited: increased risk of death in elderly patients who are confused, have memory loss, or dementia-related psychosis; hyperglycemia; increased cholesterol and triglycerides; and weight gain.
Acitretin Counseling:  I discussed with the patient the risks of acitretin including but not limited to hair loss, dry lips/skin/eyes, liver damage, hyperlipidemia, depression/suicidal ideation, photosensitivity.  Serious rare side effects can include but are not limited to pancreatitis, pseudotumor cerebri, bony changes, clot formation/stroke/heart attack.  Patient understands that alcohol is contraindicated since it can result in liver toxicity and significantly prolong the elimination of the drug by many years.
Cyclophosphamide Counseling:  I discussed with the patient the risks of cyclophosphamide including but not limited to hair loss, hormonal abnormalities, decreased fertility, abdominal pain, diarrhea, nausea and vomiting, bone marrow suppression and infection. The patient understands that monitoring is required while taking this medication.
Taltz Counseling: I discussed with the patient the risks of ixekizumab including but not limited to immunosuppression, serious infections, worsening of inflammatory bowel disease and drug reactions.  The patient understands that monitoring is required including a PPD at baseline and must alert us or the primary physician if symptoms of infection or other concerning signs are noted.
Mirvaso Pregnancy And Lactation Text: This medication has not been assigned a Pregnancy Risk Category. It is unknown if the medication is excreted in breast milk.
Clofazimine Counseling:  I discussed with the patient the risks of clofazimine including but not limited to skin and eye pigmentation, liver damage, nausea/vomiting, gastrointestinal bleeding and allergy.
Glycopyrrolate Pregnancy And Lactation Text: This medication is Pregnancy Category B and is considered safe during pregnancy. It is unknown if it is excreted breast milk.
Thalidomide Counseling: I discussed with the patient the risks of thalidomide including but not limited to birth defects, anxiety, weakness, chest pain, dizziness, cough and severe allergy.
Ivermectin Counseling:  Patient instructed to take medication on an empty stomach with a full glass of water.  Patient informed of potential adverse effects including but not limited to nausea, diarrhea, dizziness, itching, and swelling of the extremities or lymph nodes.  The patient verbalized understanding of the proper use and possible adverse effects of ivermectin.  All of the patient's questions and concerns were addressed.
Clindamycin Pregnancy And Lactation Text: This medication can be used in pregnancy if certain situations. Clindamycin is also present in breast milk.
Quinolones Counseling:  I discussed with the patient the risks of fluoroquinolones including but not limited to GI upset, allergic reaction, drug rash, diarrhea, dizziness, photosensitivity, yeast infections, liver function test abnormalities, tendonitis/tendon rupture.
Libtayo Pregnancy And Lactation Text: This medication is contraindicated in pregnancy and when breast feeding.
Cimzia Pregnancy And Lactation Text: This medication crosses the placenta but can be considered safe in certain situations. Cimzia may be excreted in breast milk.
Cantharidin Counseling:  I discussed with the patient the risks of Cantharidin including but not limited to pain, redness, burning, itching, and blistering.
Cyclosporine Pregnancy And Lactation Text: This medication is Pregnancy Category C and it isn't know if it is safe during pregnancy. This medication is excreted in breast milk.
Birth Control Pills Counseling: Birth Control Pill Counseling: I discussed with the patient the potential side effects of OCPs including but not limited to increased risk of stroke, heart attack, thrombophlebitis, deep venous thrombosis, hepatic adenomas, breast changes, GI upset, headaches, and depression.  The patient verbalized understanding of the proper use and possible adverse effects of OCPs. All of the patient's questions and concerns were addressed.
Hydroquinone Counseling:  Patient advised that medication may result in skin irritation, lightening (hypopigmentation), dryness, and burning.  In the event of skin irritation, the patient was advised to reduce the amount of the drug applied or use it less frequently.  Rarely, spots that are treated with hydroquinone can become darker (pseudoochronosis).  Should this occur, patient instructed to stop medication and call the office. The patient verbalized understanding of the proper use and possible adverse effects of hydroquinone.  All of the patient's questions and concerns were addressed.
Fluconazole Pregnancy And Lactation Text: This medication is Pregnancy Category C and it isn't know if it is safe during pregnancy. It is also excreted in breast milk.
Zoryve Counseling:  I discussed with the patient that Zoryve is not for use in the eyes, mouth or vagina. The most commonly reported side effects include diarrhea, headache, insomnia, application site pain, upper respiratory tract infections, and urinary tract infections.  All of the patient's questions and concerns were addressed.
Hyrimoz Counseling:  I discussed with the patient the risks of adalimumab including but not limited to myelosuppression, immunosuppression, autoimmune hepatitis, demyelinating diseases, lymphoma, and serious infections.  The patient understands that monitoring is required including a PPD at baseline and must alert us or the primary physician if symptoms of infection or other concerning signs are noted.
Aklief Pregnancy And Lactation Text: It is unknown if this medication is safe to use during pregnancy.  It is unknown if this medication is excreted in breast milk.  Breastfeeding women should use the topical cream on the smallest area of the skin for the shortest time needed while breastfeeding.  Do not apply to nipple and areola.
Olanzapine Pregnancy And Lactation Text: This medication is pregnancy category C.   There are no adequate and well controlled trials with olanzapine in pregnant females.  Olanzapine should be used during pregnancy only if the potential benefit justifies the potential risk to the fetus.   In a study in lactating healthy women, olanzapine was excreted in breast milk.  It is recommended that women taking olanzapine should not breast feed.
Topical Steroids Applications Pregnancy And Lactation Text: Most topical steroids are considered safe to use during pregnancy and lactation.  Any topical steroid applied to the breast or nipple should be washed off before breastfeeding.
Olumiant Counseling: I discussed with the patient the risks of Olumiant therapy including but not limited to upper respiratory tract infections, shingles, cold sores, and nausea. Live vaccines should be avoided.  This medication has been linked to serious infections; higher rate of mortality; malignancy and lymphoproliferative disorders; major adverse cardiovascular events; thrombosis; gastrointestinal perforations; neutropenia; lymphopenia; anemia; liver enzyme elevations; and lipid elevations.
SSKI Counseling:  I discussed with the patient the risks of SSKI including but not limited to thyroid abnormalities, metallic taste, GI upset, fever, headache, acne, arthralgias, paraesthesias, lymphadenopathy, easy bleeding, arrhythmias, and allergic reaction.
Cyclophosphamide Pregnancy And Lactation Text: This medication is Pregnancy Category D and it isn't considered safe during pregnancy. This medication is excreted in breast milk.
Cimetidine Counseling:  I discussed with the patient the risks of Cimetidine including but not limited to gynecomastia, headache, diarrhea, nausea, drowsiness, arrhythmias, pancreatitis, skin rashes, psychosis, bone marrow suppression and kidney toxicity.
Odomzo Counseling- I discussed with the patient the risks of Odomzo including but not limited to nausea, vomiting, diarrhea, constipation, weight loss, changes in the sense of taste, decreased appetite, muscle spasms, and hair loss.  The patient verbalized understanding of the proper use and possible adverse effects of Odomzo.  All of the patient's questions and concerns were addressed.
Tazorac Counseling:  Patient advised that medication is irritating and drying.  Patient may need to apply sparingly and wash off after an hour before eventually leaving it on overnight.  The patient verbalized understanding of the proper use and possible adverse effects of tazorac.  All of the patient's questions and concerns were addressed.
Hydroxychloroquine Counseling:  I discussed with the patient that a baseline ophthalmologic exam is needed at the start of therapy and every year thereafter while on therapy. A CBC may also be warranted for monitoring.  The side effects of this medication were discussed with the patient, including but not limited to agranulocytosis, aplastic anemia, seizures, rashes, retinopathy, and liver toxicity. Patient instructed to call the office should any adverse effect occur.  The patient verbalized understanding of the proper use and possible adverse effects of Plaquenil.  All the patient's questions and concerns were addressed.
Acitretin Pregnancy And Lactation Text: This medication is Pregnancy Category X and should not be given to women who are pregnant or may become pregnant in the future. This medication is excreted in breast milk.
Opzelura Counseling:  I discussed with the patient the risks of Opzelura including but not limited to nasopharngitis, bronchitis, ear infection, eosinophila, hives, diarrhea, folliculitis, tonsillitis, and rhinorrhea.  Taken orally, this medication has been linked to serious infections; higher rate of mortality; malignancy and lymphoproliferative disorders; major adverse cardiovascular events; thrombosis; thrombocytopenia, anemia, and neutropenia; and lipid elevations.
Cantharidin Pregnancy And Lactation Text: This medication has not been proven safe during pregnancy. It is unknown if this medication is excreted in breast milk.
Griseofulvin Counseling:  I discussed with the patient the risks of griseofulvin including but not limited to photosensitivity, cytopenia, liver damage, nausea/vomiting and severe allergy.  The patient understands that this medication is best absorbed when taken with a fatty meal (e.g., ice cream or french fries).
Methotrexate Counseling:  Patient counseled regarding adverse effects of methotrexate including but not limited to nausea, vomiting, abnormalities in liver function tests. Patients may develop mouth sores, rash, diarrhea, and abnormalities in blood counts. The patient understands that monitoring is required including LFT's and blood counts.  There is a rare possibility of scarring of the liver and lung problems that can occur when taking methotrexate. Persistent nausea, loss of appetite, pale stools, dark urine, cough, and shortness of breath should be reported immediately. Patient advised to discontinue methotrexate treatment at least three months before attempting to become pregnant.  I discussed the need for folate supplements while taking methotrexate.  These supplements can decrease side effects during methotrexate treatment. The patient verbalized understanding of the proper use and possible adverse effects of methotrexate.  All of the patient's questions and concerns were addressed.
Doxycycline Counseling:  Patient counseled regarding possible photosensitivity and increased risk for sunburn.  Patient instructed to avoid sunlight, if possible.  When exposed to sunlight, patients should wear protective clothing, sunglasses, and sunscreen.  The patient was instructed to call the office immediately if the following severe adverse effects occur:  hearing changes, easy bruising/bleeding, severe headache, or vision changes.  The patient verbalized understanding of the proper use and possible adverse effects of doxycycline.  All of the patient's questions and concerns were addressed.
Birth Control Pills Pregnancy And Lactation Text: This medication should be avoided if pregnant and for the first 30 days post-partum.
Simponi Counseling:  I discussed with the patient the risks of golimumab including but not limited to myelosuppression, immunosuppression, autoimmune hepatitis, demyelinating diseases, lymphoma, and serious infections.  The patient understands that monitoring is required including a PPD at baseline and must alert us or the primary physician if symptoms of infection or other concerning signs are noted.
Olumiant Pregnancy And Lactation Text: Based on animal studies, Olumiant may cause embryo-fetal harm when administered to pregnant women.  The medication should not be used in pregnancy.  Breastfeeding is not recommended during treatment.
Cyclosporine Counseling:  I discussed with the patient the risks of cyclosporine including but not limited to hypertension, gingival hyperplasia,myelosuppression, immunosuppression, liver damage, kidney damage, neurotoxicity, lymphoma, and serious infections. The patient understands that monitoring is required including baseline blood pressure, CBC, CMP, lipid panel and uric acid, and then 1-2 times monthly CMP and blood pressure.
5-Fu Counseling: 5-Fluorouracil Counseling:  I discussed with the patient the risks of 5-fluorouracil including but not limited to erythema, scaling, itching, weeping, crusting, and pain.
Cosentyx Counseling:  I discussed with the patient the risks of Cosentyx including but not limited to worsening of Crohn's disease, immunosuppression, allergic reactions and infections.  The patient understands that monitoring is required including a PPD at baseline and must alert us or the primary physician if symptoms of infection or other concerning signs are noted.
Tremfya Counseling: I discussed with the patient the risks of guselkumab including but not limited to immunosuppression, serious infections, and drug reactions.  The patient understands that monitoring is required including a PPD at baseline and must alert us or the primary physician if symptoms of infection or other concerning signs are noted.
Azelaic Acid Counseling: Patient counseled that medicine may cause skin irritation and to avoid applying near the eyes.  In the event of skin irritation, the patient was advised to reduce the amount of the drug applied or use it less frequently.   The patient verbalized understanding of the proper use and possible adverse effects of azelaic acid.  All of the patient's questions and concerns were addressed.
Hydroxychloroquine Pregnancy And Lactation Text: This medication has been shown to cause fetal harm but it isn't assigned a Pregnancy Risk Category. There are small amounts excreted in breast milk.
Rifampin Counseling: I discussed with the patient the risks of rifampin including but not limited to liver damage, kidney damage, red-orange body fluids, nausea/vomiting and severe allergy.
Tazorac Pregnancy And Lactation Text: This medication is not safe during pregnancy. It is unknown if this medication is excreted in breast milk.
Topical Sulfur Applications Counseling: Topical Sulfur Counseling: Patient counseled that this medication may cause skin irritation or allergic reactions.  In the event of skin irritation, the patient was advised to reduce the amount of the drug applied or use it less frequently.   The patient verbalized understanding of the proper use and possible adverse effects of topical sulfur application.  All of the patient's questions and concerns were addressed.
Oral Minoxidil Counseling- I discussed with the patient the risks of oral minoxidil including but not limited to shortness of breath, swelling of the feet or ankles, dizziness, lightheadedness, unwanted hair growth and allergic reaction.  The patient verbalized understanding of the proper use and possible adverse effects of oral minoxidil.  All of the patient's questions and concerns were addressed.
Spironolactone Counseling: Patient advised regarding risks of diarrhea, abdominal pain, hyperkalemia, birth defects (for female patients), liver toxicity and renal toxicity. The patient may need blood work to monitor liver and kidney function and potassium levels while on therapy. The patient verbalized understanding of the proper use and possible adverse effects of spironolactone.  All of the patient's questions and concerns were addressed.
Bexarotene Counseling:  I discussed with the patient the risks of bexarotene including but not limited to hair loss, dry lips/skin/eyes, liver abnormalities, hyperlipidemia, pancreatitis, depression/suicidal ideation, photosensitivity, drug rash/allergic reactions, hypothyroidism, anemia, leukopenia, infection, cataracts, and teratogenicity.  Patient understands that they will need regular blood tests to check lipid profile, liver function tests, white blood cell count, thyroid function tests and pregnancy test if applicable.
Ilumya Counseling: I discussed with the patient the risks of tildrakizumab including but not limited to immunosuppression, malignancy, posterior leukoencephalopathy syndrome, and serious infections.  The patient understands that monitoring is required including a PPD at baseline and must alert us or the primary physician if symptoms of infection or other concerning signs are noted.
Imiquimod Counseling:  I discussed with the patient the risks of imiquimod including but not limited to erythema, scaling, itching, weeping, crusting, and pain.  Patient understands that the inflammatory response to imiquimod is variable from person to person and was educated regarded proper titration schedule.  If flu-like symptoms develop, patient knows to discontinue the medication and contact us.
Griseofulvin Pregnancy And Lactation Text: This medication is Pregnancy Category X and is known to cause serious birth defects. It is unknown if this medication is excreted in breast milk but breast feeding should be avoided.
Opzelura Pregnancy And Lactation Text: There is insufficient data to evaluate drug-associated risk for major birth defects, miscarriage, or other adverse maternal or fetal outcomes.  There is a pregnancy registry that monitors pregnancy outcomes in pregnant persons exposed to the medication during pregnancy.  It is unknown if this medication is excreted in breast milk.  Do not breastfeed during treatment and for about 4 weeks after the last dose.
Tranexamic Acid Counseling:  Patient advised of the small risk of bleeding problems with tranexamic acid. They were also instructed to call if they developed any nausea, vomiting or diarrhea. All of the patient's questions and concerns were addressed.
Rhofade Counseling: Rhofade is a topical medication which can decrease superficial blood flow where applied. Side effects are uncommon and include stinging, redness and allergic reactions.
Methotrexate Pregnancy And Lactation Text: This medication is Pregnancy Category X and is known to cause fetal harm. This medication is excreted in breast milk.
Doxycycline Pregnancy And Lactation Text: This medication is Pregnancy Category D and not consider safe during pregnancy. It is also excreted in breast milk but is considered safe for shorter treatment courses.
Zyclara Counseling:  I discussed with the patient the risks of imiquimod including but not limited to erythema, scaling, itching, weeping, crusting, and pain.  Patient understands that the inflammatory response to imiquimod is variable from person to person and was educated regarded proper titration schedule.  If flu-like symptoms develop, patient knows to discontinue the medication and contact us.
Doxepin Counseling:  Patient advised that the medication is sedating and not to drive a car after taking this medication. Patient informed of potential adverse effects including but not limited to dry mouth, urinary retention, and blurry vision.  The patient verbalized understanding of the proper use and possible adverse effects of doxepin.  All of the patient's questions and concerns were addressed.
Azithromycin Counseling:  I discussed with the patient the risks of azithromycin including but not limited to GI upset, allergic reaction, drug rash, diarrhea, and yeast infections.
Colchicine Counseling:  Patient counseled regarding adverse effects including but not limited to stomach upset (nausea, vomiting, stomach pain, or diarrhea).  Patient instructed to limit alcohol consumption while taking this medication.  Colchicine may reduce blood counts especially with prolonged use.  The patient understands that monitoring of kidney function and blood counts may be required, especially at baseline. The patient verbalized understanding of the proper use and possible adverse effects of colchicine.  All of the patient's questions and concerns were addressed.
Rinvoq Counseling: I discussed with the patient the risks of Rinvoq therapy including but not limited to upper respiratory tract infections, shingles, cold sores, bronchitis, nausea, cough, fever, acne, and headache. Live vaccines should be avoided.  This medication has been linked to serious infections; higher rate of mortality; malignancy and lymphoproliferative disorders; major adverse cardiovascular events; thrombosis; thrombocytopenia, anemia, and neutropenia; lipid elevations; liver enzyme elevations; and gastrointestinal perforations.
Topical Sulfur Applications Pregnancy And Lactation Text: This medication is considered safe during pregnancy and breast feeding secondary to limited systemic absorption.
Oral Minoxidil Pregnancy And Lactation Text: This medication should only be used when clearly needed if you are pregnant, attempting to become pregnant or breast feeding.
Azelaic Acid Pregnancy And Lactation Text: This medication is considered safe during pregnancy and breast feeding.
Bexarotene Pregnancy And Lactation Text: This medication is Pregnancy Category X and should not be given to women who are pregnant or may become pregnant. This medication should not be used if you are breast feeding.
Skyrizi Counseling: I discussed with the patient the risks of risankizumab-rzaa including but not limited to immunosuppression, and serious infections.  The patient understands that monitoring is required including a PPD at baseline and must alert us or the primary physician if symptoms of infection or other concerning signs are noted.
Picato Counseling:  I discussed with the patient the risks of Picato including but not limited to erythema, scaling, itching, weeping, crusting, and pain.
Low Dose Naltrexone Counseling- I discussed with the patient the potential risks and side effects of low dose naltrexone including but not limited to: more vivid dreams, headaches, nausea, vomiting, abdominal pain, fatigue, dizziness, and anxiety.
Rifampin Pregnancy And Lactation Text: This medication is Pregnancy Category C and it isn't know if it is safe during pregnancy. It is also excreted in breast milk and should not be used if you are breast feeding.
Topical Clindamycin Counseling: Patient counseled that this medication may cause skin irritation or allergic reactions.  In the event of skin irritation, the patient was advised to reduce the amount of the drug applied or use it less frequently.   The patient verbalized understanding of the proper use and possible adverse effects of clindamycin.  All of the patient's questions and concerns were addressed.
Tranexamic Acid Pregnancy And Lactation Text: It is unknown if this medication is safe during pregnancy or breast feeding.
Prednisone Counseling:  I discussed with the patient the risks of prolonged use of prednisone including but not limited to weight gain, insomnia, osteoporosis, mood changes, diabetes, susceptibility to infection, glaucoma and high blood pressure.  In cases where prednisone use is prolonged, patients should be monitored with blood pressure checks, serum glucose levels and an eye exam.  Additionally, the patient may need to be placed on GI prophylaxis, PCP prophylaxis, and calcium and vitamin D supplementation and/or a bisphosphonate.  The patient verbalized understanding of the proper use and the possible adverse effects of prednisone.  All of the patient's questions and concerns were addressed.
Erythromycin Counseling:  I discussed with the patient the risks of erythromycin including but not limited to GI upset, allergic reaction, drug rash, diarrhea, increase in liver enzymes, and yeast infections.
Drysol Counseling:  I discussed with the patient the risks of drysol/aluminum chloride including but not limited to skin rash, itching, irritation, burning.
Dupixent Counseling: I discussed with the patient the risks of dupilumab including but not limited to eye infection and irritation, cold sores, injection site reactions, worsening of asthma, allergic reactions and increased risk of parasitic infection.  Live vaccines should be avoided while taking dupilumab. Dupilumab will also interact with certain medications such as warfarin and cyclosporine. The patient understands that monitoring is required and they must alert us or the primary physician if symptoms of infection or other concerning signs are noted.
Dutasteride Male Counseling: Dustasteride Counseling:  I discussed with the patient the risks of use of dutasteride including but not limited to decreased libido, decreased ejaculate volume, and gynecomastia. Women who can become pregnant should not handle medication.  All of the patient's questions and concerns were addressed.
Otezla Counseling: The side effects of Otezla were discussed with the patient, including but not limited to worsening or new depression, weight loss, diarrhea, nausea, upper respiratory tract infection, and headache. Patient instructed to call the office should any adverse effect occur.  The patient verbalized understanding of the proper use and possible adverse effects of Otezla.  All the patient's questions and concerns were addressed.
Azithromycin Pregnancy And Lactation Text: This medication is considered safe during pregnancy and is also secreted in breast milk.
Wartpeel Counseling:  I discussed with the patient the risks of Wartpeel including but not limited to erythema, scaling, itching, weeping, crusting, and pain.
Itraconazole Counseling:  I discussed with the patient the risks of itraconazole including but not limited to liver damage, nausea/vomiting, neuropathy, and severe allergy.  The patient understands that this medication is best absorbed when taken with acidic beverages such as non-diet cola or ginger ale.  The patient understands that monitoring is required including baseline LFTs and repeat LFTs at intervals.  The patient understands that they are to contact us or the primary physician if concerning signs are noted.
Rinvoq Pregnancy And Lactation Text: Based on animal studies, Rinvoq may cause embryo-fetal harm when administered to pregnant women.  The medication should not be used in pregnancy.  Breastfeeding is not recommended during treatment and for 6 days after the last dose.
Doxepin Pregnancy And Lactation Text: This medication is Pregnancy Category C and it isn't known if it is safe during pregnancy. It is also excreted in breast milk and breast feeding isn't recommended.
Spironolactone Pregnancy And Lactation Text: This medication can cause feminization of the male fetus and should be avoided during pregnancy. The active metabolite is also found in breast milk.
Benzoyl Peroxide Counseling: Patient counseled that medicine may cause skin irritation and bleach clothing.  In the event of skin irritation, the patient was advised to reduce the amount of the drug applied or use it less frequently.   The patient verbalized understanding of the proper use and possible adverse effects of benzoyl peroxide.  All of the patient's questions and concerns were addressed.
Isotretinoin Counseling: Patient should get monthly blood tests, not donate blood, not drive at night if vision affected, not share medication, and not undergo elective surgery for 6 months after tx completed. Side effects reviewed, pt to contact office should one occur.
Xolair Counseling:  Patient informed of potential adverse effects including but not limited to fever, muscle aches, rash and allergic reactions.  The patient verbalized understanding of the proper use and possible adverse effects of Xolair.  All of the patient's questions and concerns were addressed.
Solaraze Counseling:  I discussed with the patient the risks of Solaraze including but not limited to erythema, scaling, itching, weeping, crusting, and pain.
Dapsone Counseling: I discussed with the patient the risks of dapsone including but not limited to hemolytic anemia, agranulocytosis, rashes, methemoglobinemia, kidney failure, peripheral neuropathy, headaches, GI upset, and liver toxicity.  Patients who start dapsone require monitoring including baseline LFTs and weekly CBCs for the first month, then every month thereafter.  The patient verbalized understanding of the proper use and possible adverse effects of dapsone.  All of the patient's questions and concerns were addressed.
Low Dose Naltrexone Pregnancy And Lactation Text: Naltrexone is pregnancy category C.  There have been no adequate and well-controlled studies in pregnant women.  It should be used in pregnancy only if the potential benefit justifies the potential risk to the fetus.   Limited data indicates that naltrexone is minimally excreted into breastmilk.
Valtrex Counseling: I discussed with the patient the risks of valacyclovir including but not limited to kidney damage, nausea, vomiting and severe allergy.  The patient understands that if the infection seems to be worsening or is not improving, they are to call.
Erythromycin Pregnancy And Lactation Text: This medication is Pregnancy Category B and is considered safe during pregnancy. It is also excreted in breast milk.
Sarecycline Counseling: Patient advised regarding possible photosensitivity and discoloration of the teeth, skin, lips, tongue and gums.  Patient instructed to avoid sunlight, if possible.  When exposed to sunlight, patients should wear protective clothing, sunglasses, and sunscreen.  The patient was instructed to call the office immediately if the following severe adverse effects occur:  hearing changes, easy bruising/bleeding, severe headache, or vision changes.  The patient verbalized understanding of the proper use and possible adverse effects of sarecycline.  All of the patient's questions and concerns were addressed.
Dupixent Pregnancy And Lactation Text: This medication likely crosses the placenta but the risk for the fetus is uncertain. This medication is excreted in breast milk.
Klisyri Counseling:  I discussed with the patient the risks of Klisyri including but not limited to erythema, scaling, itching, weeping, crusting, and pain.
Bactrim Counseling:  I discussed with the patient the risks of sulfa antibiotics including but not limited to GI upset, allergic reaction, drug rash, diarrhea, dizziness, photosensitivity, and yeast infections.  Rarely, more serious reactions can occur including but not limited to aplastic anemia, agranulocytosis, methemoglobinemia, blood dyscrasias, liver or kidney failure, lung infiltrates or desquamative/blistering drug rashes.
Infliximab Counseling:  I discussed with the patient the risks of infliximab including but not limited to myelosuppression, immunosuppression, autoimmune hepatitis, demyelinating diseases, lymphoma, and serious infections.  The patient understands that monitoring is required including a PPD at baseline and must alert us or the primary physician if symptoms of infection or other concerning signs are noted.
Simlandi Counseling:  I discussed with the patient the risks of adalimumab including but not limited to myelosuppression, immunosuppression, autoimmune hepatitis, demyelinating diseases, lymphoma, and serious infections.  The patient understands that monitoring is required including a PPD at baseline and must alert us or the primary physician if symptoms of infection or other concerning signs are noted.
Ebglyss Counseling: I discussed with the patient the risks of lebrikizumab including but not limited to eye inflammation and irritation, cold sores, injection site reactions, allergic reactions and increased risk of parasitic infection. The patient understands that monitoring is required and they must alert us or the primary physician if symptoms of infection or other concerning signs are noted.
Nemluvio Counseling: I discussed with the patient the risks of nemolizumab including but not limited to headache, gastrointestinal complaints, nasopharyngitis, musculoskeletal complaints, injection site reactions, and allergic reactions. The patient understands that monitoring is required and they must alert us or the primary physician if any side effects are noted.
Ebglyss Pregnancy And Lactation Text: This medication likely crosses the placenta but the risk for the fetus is uncertain. It is unknown if this medication is excreted in breast milk.
Nemluvio Pregnancy And Lactation Text: It is not known if Nemluvio causes fetal harm or is present in breast milk. Please proceed with caution if patients who are pregnant or breastfeeding.

## 2024-11-15 NOTE — PROCEDURE: PRESCRIPTION MEDICATION MANAGEMENT
Samples Given: VTAMA
Discontinue Regimen: Clindamycin lotion & Econazole cream.
Initiate Treatment: VTAMA - apply to affected areas of the rash once daily after using Triamcinolone for two weeks.
Detail Level: Simple
Continue Regimen: Triamcinolone: apply to affected areas of rash twice daily for two weeks, the switch to VTAMA once daily.
Render In Strict Bullet Format?: No
Plan: NOTE: rash has NOT been improving with just Triamcinolone

## 2024-11-15 NOTE — PROCEDURE: ADDITIONAL NOTES
yes
Detail Level: Simple
Render Risk Assessment In Note?: no
Additional Notes: Discussed ACD vs atopic dermatitis \\npatient has done her own \"patch test\" by stopping any products with fragrance. She switched deodorant to Almay without any improvement.  She could not find vanicream deodorant \\n\\nPgaurav has declined a biopsy at this time.

## 2024-11-15 NOTE — HPI: RASH
What Type Of Note Output Would You Prefer (Optional)?: Standard Output
Is The Patient Presenting As Previously Scheduled?: Yes
How Severe Is Your Rash?: moderate
Is This A New Presentation, Or A Follow-Up?: Follow Up Rash
Additional History: Rash has improved but is still itchy as per pt.

## 2025-01-30 ENCOUNTER — WEB ENCOUNTER (OUTPATIENT)
Dept: URBAN - METROPOLITAN AREA CLINIC 92 | Facility: CLINIC | Age: 30
End: 2025-01-30

## 2025-01-30 ENCOUNTER — TELEPHONE ENCOUNTER (OUTPATIENT)
Dept: URBAN - METROPOLITAN AREA CLINIC 92 | Facility: CLINIC | Age: 30
End: 2025-01-30

## 2025-02-03 ENCOUNTER — TELEPHONE ENCOUNTER (OUTPATIENT)
Dept: URBAN - METROPOLITAN AREA CLINIC 97 | Facility: CLINIC | Age: 30
End: 2025-02-03

## 2025-02-05 ENCOUNTER — TELEPHONE ENCOUNTER (OUTPATIENT)
Dept: URBAN - METROPOLITAN AREA CLINIC 117 | Facility: CLINIC | Age: 30
End: 2025-02-05

## 2025-02-05 ENCOUNTER — OFFICE VISIT (OUTPATIENT)
Dept: URBAN - METROPOLITAN AREA CLINIC 117 | Facility: CLINIC | Age: 30
End: 2025-02-05
Payer: COMMERCIAL

## 2025-02-05 VITALS
RESPIRATION RATE: 20 BRPM | BODY MASS INDEX: 21.16 KG/M2 | HEART RATE: 72 BPM | TEMPERATURE: 97.9 F | HEIGHT: 62 IN | SYSTOLIC BLOOD PRESSURE: 101 MMHG | DIASTOLIC BLOOD PRESSURE: 66 MMHG | WEIGHT: 115 LBS

## 2025-02-05 DIAGNOSIS — K50.80 CROHN'S DISEASE OF BOTH SMALL AND LARGE INTESTINE WITHOUT COMPLICATION: ICD-10-CM

## 2025-02-05 PROCEDURE — 96375 TX/PRO/DX INJ NEW DRUG ADDON: CPT | Performed by: INTERNAL MEDICINE

## 2025-02-05 PROCEDURE — 96413 CHEMO IV INFUSION 1 HR: CPT | Performed by: INTERNAL MEDICINE

## 2025-02-05 PROCEDURE — 96415 CHEMO IV INFUSION ADDL HR: CPT | Performed by: INTERNAL MEDICINE

## 2025-02-05 RX ORDER — OXYCODONE HYDROCHLORIDE AND ACETAMINOPHEN 5; 325 MG/1; MG/1
TAKE 1 TABLET BY MOUTH EVERY 6 HOURS AS NEEDED FOR PAIN TABLET ORAL
Qty: 12 EACH | Refills: 0 | Status: ON HOLD | COMMUNITY

## 2025-02-05 RX ORDER — INFLIXIMAB 100 MG/10ML
AS DIRECTED INJECTION, POWDER, LYOPHILIZED, FOR SOLUTION INTRAVENOUS
Status: ACTIVE | COMMUNITY
Start: 2024-11-01

## 2025-02-05 RX ORDER — ZOSTER VACCINE RECOMBINANT, ADJUVANTED 50 MCG/0.5
AS DIRECTED KIT INTRAMUSCULAR
Qty: 0.5 ML | Refills: 1 | Status: ACTIVE | COMMUNITY

## 2025-02-05 RX ORDER — LEVONORGESTREL/ETHINYL ESTRADIOL AND ETHINYL ESTRADIOL 100-20(84)
KIT ORAL
Qty: 91 TABLET | Status: ACTIVE | COMMUNITY

## 2025-02-05 RX ORDER — OMEPRAZOLE 40 MG/1
1 CAPSULE 30 MINUTES BEFORE MORNING MEAL CAPSULE, DELAYED RELEASE ORAL ONCE A DAY
Qty: 90 | Refills: 3 | Status: ON HOLD | COMMUNITY

## 2025-02-05 RX ORDER — LACTOBACIL 2/BIFIDO 1/S.THERMO 450B CELL
2 CAPSULE PACKET (EA) ORAL ONCE A DAY
Qty: 60 CAPSULE | Refills: 5 | Status: ACTIVE | COMMUNITY

## 2025-02-05 RX ORDER — PREDNISONE 10 MG/1
TAKE 4 TABLETS (40MG TOTAL) BY MOUTH IN THE MORNING FOR 5 DAYS TABLET ORAL
Qty: 20 EACH | Refills: 0 | Status: ON HOLD | COMMUNITY

## 2025-02-05 RX ORDER — UPADACITINIB 30 MG/1
AS DIRECTED TABLET, EXTENDED RELEASE ORAL ONCE A DAY
Qty: 84 | Refills: 0 | Status: ACTIVE | COMMUNITY

## 2025-02-14 ENCOUNTER — TELEPHONE ENCOUNTER (OUTPATIENT)
Dept: URBAN - METROPOLITAN AREA CLINIC 92 | Facility: CLINIC | Age: 30
End: 2025-02-14

## 2025-02-14 ENCOUNTER — OFFICE VISIT (OUTPATIENT)
Dept: URBAN - METROPOLITAN AREA TELEHEALTH 2 | Facility: TELEHEALTH | Age: 30
End: 2025-02-14
Payer: COMMERCIAL

## 2025-02-14 VITALS — WEIGHT: 115 LBS | HEIGHT: 62 IN | BODY MASS INDEX: 21.16 KG/M2

## 2025-02-14 DIAGNOSIS — K50.80 CROHN'S DISEASE OF BOTH SMALL AND LARGE INTESTINE WITHOUT COMPLICATION: ICD-10-CM

## 2025-02-14 DIAGNOSIS — E55.9 VITAMIN D DEFICIENCY: ICD-10-CM

## 2025-02-14 DIAGNOSIS — Z71.85 VACCINE COUNSELING: ICD-10-CM

## 2025-02-14 DIAGNOSIS — R11.2 NAUSEA AND VOMITING, UNSPECIFIED VOMITING TYPE: ICD-10-CM

## 2025-02-14 DIAGNOSIS — R74.8 ELEVATED LIVER ENZYMES: ICD-10-CM

## 2025-02-14 DIAGNOSIS — A04.72 CLOSTRIDIUM DIFFICILE COLITIS: ICD-10-CM

## 2025-02-14 DIAGNOSIS — R10.84 GENERALIZED ABDOMINAL PAIN: ICD-10-CM

## 2025-02-14 DIAGNOSIS — M19.90 ARTHRITIS: ICD-10-CM

## 2025-02-14 DIAGNOSIS — D50.0 IRON DEFICIENCY ANEMIA DUE TO CHRONIC BLOOD LOSS: ICD-10-CM

## 2025-02-14 DIAGNOSIS — R19.7 ACUTE DIARRHEA: ICD-10-CM

## 2025-02-14 PROCEDURE — 99215 OFFICE O/P EST HI 40 MIN: CPT | Performed by: PHYSICIAN ASSISTANT

## 2025-02-14 RX ORDER — OMEPRAZOLE 40 MG/1
1 CAPSULE 30 MINUTES BEFORE MORNING MEAL CAPSULE, DELAYED RELEASE ORAL ONCE A DAY
Qty: 90 | Refills: 3 | Status: ON HOLD | COMMUNITY

## 2025-02-14 RX ORDER — LACTOBACIL 2/BIFIDO 1/S.THERMO 450B CELL
2 CAPSULE PACKET (EA) ORAL ONCE A DAY
Qty: 60 CAPSULE | Refills: 5 | Status: ACTIVE | COMMUNITY

## 2025-02-14 RX ORDER — INFLIXIMAB 100 MG/10ML
AS DIRECTED INJECTION, POWDER, LYOPHILIZED, FOR SOLUTION INTRAVENOUS
Status: ACTIVE | COMMUNITY
Start: 2024-11-01

## 2025-02-14 RX ORDER — ZOSTER VACCINE RECOMBINANT, ADJUVANTED 50 MCG/0.5
AS DIRECTED KIT INTRAMUSCULAR
Qty: 0.5 ML | Refills: 1 | Status: ACTIVE | COMMUNITY

## 2025-02-14 RX ORDER — ACETAMINOPHEN 650 MG
2 TABLETS AS NEEDED TABLET, EXTENDED RELEASE ORAL
Qty: 6 | Refills: 0 | OUTPATIENT
Start: 2025-02-14 | End: 2025-02-15

## 2025-02-14 RX ORDER — UPADACITINIB 30 MG/1
AS DIRECTED TABLET, EXTENDED RELEASE ORAL ONCE A DAY
Qty: 84 | Refills: 0 | Status: ACTIVE | COMMUNITY

## 2025-02-14 RX ORDER — HYDROCORTISONE SODIUM SUCCINATE 100 MG/2ML
AS DIRECTED INJECTION, POWDER, FOR SOLUTION INTRAMUSCULAR; INTRAVENOUS
Qty: 100 | Refills: 0 | OUTPATIENT
Start: 2025-02-14 | End: 2025-02-15

## 2025-02-14 RX ORDER — LACTOBACIL 2/BIFIDO 1/S.THERMO 450B CELL
2 CAPSULE PACKET (EA) ORAL ONCE A DAY
Qty: 60 CAPSULE | Refills: 5 | OUTPATIENT

## 2025-02-14 RX ORDER — DIPHENHYDRAMINE HCL 2 %
1 CAPSULE AT BEDTIME AS NEEDED CREAM (GRAM) TOPICAL ONCE A DAY
Qty: 30 | Refills: 0 | OUTPATIENT
Start: 2025-02-14 | End: 2025-03-16

## 2025-02-14 RX ORDER — INFLIXIMAB 100 MG/10ML
AS DIRECTED INJECTION, POWDER, LYOPHILIZED, FOR SOLUTION INTRAVENOUS
OUTPATIENT

## 2025-02-14 RX ORDER — ZOSTER VACCINE RECOMBINANT, ADJUVANTED 50 MCG/0.5
AS DIRECTED KIT INTRAMUSCULAR
Qty: 0.5 ML | Refills: 1 | OUTPATIENT

## 2025-02-14 RX ORDER — OXYCODONE HYDROCHLORIDE AND ACETAMINOPHEN 5; 325 MG/1; MG/1
TAKE 1 TABLET BY MOUTH EVERY 6 HOURS AS NEEDED FOR PAIN TABLET ORAL
Qty: 12 EACH | Refills: 0 | Status: ON HOLD | COMMUNITY

## 2025-02-14 RX ORDER — LEVONORGESTREL/ETHINYL ESTRADIOL AND ETHINYL ESTRADIOL 100-20(84)
KIT ORAL
Qty: 91 TABLET | Status: ACTIVE | COMMUNITY

## 2025-02-14 RX ORDER — PREDNISONE 10 MG/1
TAKE 4 TABLETS (40MG TOTAL) BY MOUTH IN THE MORNING FOR 5 DAYS TABLET ORAL
Qty: 20 EACH | Refills: 0 | Status: ON HOLD | COMMUNITY

## 2025-02-14 NOTE — HPI-OTHER HISTORIES
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  Pouchoscopy 3/2021 moderate pouchitis and mild erythema of shun-TI, bx of TI chronic active ileitis with apthous ulcer c/w crohns and pouch w chronic active ileitis Pouchoscopy 3/11/2022 mild inflammation of pouch with normal shun-TI, bx  w/ chronic active pouchitis Pouchoscopy 5/12/2023 showed moderate pouchitis but normal shun-TI, bx + active inflammation/ulceration She was initiated on Rinvoq 45 in Aug 2023 and did well for months but had a lapse in meds and she presented to the ED in Nov 2023; CT revealed distended residual rectum, no evidence of obstruction. Prior CT scan on 10/10/23 revealed colectomy with J pouch, distal small bowel with mild wall thickening to the level of anastomosis suggesting mild uncomplicated enteritis. C diff + s/p dific and 3/2024 CT w/ unchanged postoperative changes of colectomy with ileoanal anastomosis and patulous segment of bowel at the anastomosis. EGD was normal, bx neg. C Diff +, tx with dificid again and was back in clinical remission and calpro as below but then presented back in early Aug with 8-10 looser BMs/day and C diff + given 25 day taper which she completed 9/6 with good response but then within a week noted diarrhea, N/V. She was seen at [facility] ER. CT Shun colon shows a focal segment of inflammation left of midline in the pelvis. ALT 63 AST                           42 . Hg 16.4 Hct 49. Stool studies neg including c diff. She was given IVF and continued Rinvoq + Injectafer X 2 in May.  Pouchoscopy 10/4/2024: Moderate inflammation was found in the ileum. Benign polyp in the ileoanal pouch, Erythematous mucosa in the shun-terminal ileum. Biopsies showed Chronic active ileitis with ulceration and J-pouch w/ focal active inflammation and ulceration.No granulomata or dysplasia. +Rashes on legs, saw derm and dx with staph s/p I&D and on doxy with probiotics.  She got shingrex X 2 She was seen at [facility] and [facility] X 2 in Jan--abd pain, N/V. Reports colonization with C diff per ID but not active infection-reports CT and labs as well and given IV steroids X 1 week and 1 week po, off now X 1m and since then pain has improved and BMs down to 5-6/day, still urgent but no bleeding and pain more mild. She started Avsola 2/5 but 40 min into the infusion had itching on the legs, trunk and back + Nausea and sudden onset headache. Symptoms resolved and no SOB/CP.

## 2025-02-19 ENCOUNTER — TELEPHONE ENCOUNTER (OUTPATIENT)
Dept: URBAN - METROPOLITAN AREA CLINIC 117 | Facility: CLINIC | Age: 30
End: 2025-02-19

## 2025-02-19 ENCOUNTER — OFFICE VISIT (OUTPATIENT)
Dept: URBAN - METROPOLITAN AREA CLINIC 117 | Facility: CLINIC | Age: 30
End: 2025-02-19
Payer: COMMERCIAL

## 2025-02-19 VITALS
SYSTOLIC BLOOD PRESSURE: 112 MMHG | DIASTOLIC BLOOD PRESSURE: 76 MMHG | TEMPERATURE: 97.9 F | BODY MASS INDEX: 20.87 KG/M2 | HEIGHT: 62 IN | WEIGHT: 113.4 LBS | RESPIRATION RATE: 20 BRPM | HEART RATE: 97 BPM

## 2025-02-19 DIAGNOSIS — K50.80 CROHN'S DISEASE OF BOTH SMALL AND LARGE INTESTINE WITHOUT COMPLICATION: ICD-10-CM

## 2025-02-19 PROCEDURE — 96375 TX/PRO/DX INJ NEW DRUG ADDON: CPT | Performed by: INTERNAL MEDICINE

## 2025-02-19 PROCEDURE — 96413 CHEMO IV INFUSION 1 HR: CPT | Performed by: INTERNAL MEDICINE

## 2025-02-19 RX ORDER — OMEPRAZOLE 40 MG/1
1 CAPSULE 30 MINUTES BEFORE MORNING MEAL CAPSULE, DELAYED RELEASE ORAL ONCE A DAY
Qty: 90 | Refills: 3 | Status: ON HOLD | COMMUNITY

## 2025-02-19 RX ORDER — INFLIXIMAB 100 MG/10ML
AS DIRECTED INJECTION, POWDER, LYOPHILIZED, FOR SOLUTION INTRAVENOUS
Status: ACTIVE | COMMUNITY

## 2025-02-19 RX ORDER — PREDNISONE 10 MG/1
TAKE 4 TABLETS (40MG TOTAL) BY MOUTH IN THE MORNING FOR 5 DAYS TABLET ORAL
Qty: 20 EACH | Refills: 0 | Status: ON HOLD | COMMUNITY

## 2025-02-19 RX ORDER — LACTOBACIL 2/BIFIDO 1/S.THERMO 450B CELL
2 CAPSULE PACKET (EA) ORAL ONCE A DAY
Qty: 60 CAPSULE | Refills: 5 | Status: ACTIVE | COMMUNITY

## 2025-02-19 RX ORDER — OXYCODONE HYDROCHLORIDE AND ACETAMINOPHEN 5; 325 MG/1; MG/1
TAKE 1 TABLET BY MOUTH EVERY 6 HOURS AS NEEDED FOR PAIN TABLET ORAL
Qty: 12 EACH | Refills: 0 | Status: ON HOLD | COMMUNITY

## 2025-02-19 RX ORDER — ZOSTER VACCINE RECOMBINANT, ADJUVANTED 50 MCG/0.5
AS DIRECTED KIT INTRAMUSCULAR
Qty: 0.5 ML | Refills: 1 | Status: ACTIVE | COMMUNITY

## 2025-02-19 RX ORDER — UPADACITINIB 30 MG/1
AS DIRECTED TABLET, EXTENDED RELEASE ORAL ONCE A DAY
Qty: 84 | Refills: 0 | Status: ACTIVE | COMMUNITY

## 2025-02-19 RX ORDER — DIPHENHYDRAMINE HCL 2 %
1 CAPSULE AT BEDTIME AS NEEDED CREAM (GRAM) TOPICAL ONCE A DAY
Qty: 30 | Refills: 0 | Status: ACTIVE | COMMUNITY
Start: 2025-02-14 | End: 2025-03-16

## 2025-02-19 RX ORDER — LEVONORGESTREL/ETHINYL ESTRADIOL AND ETHINYL ESTRADIOL 100-20(84)
KIT ORAL
Qty: 91 TABLET | Status: ACTIVE | COMMUNITY

## 2025-02-25 ENCOUNTER — OFFICE VISIT (OUTPATIENT)
Dept: URBAN - METROPOLITAN AREA TELEHEALTH 2 | Facility: TELEHEALTH | Age: 30
End: 2025-02-25
Payer: COMMERCIAL

## 2025-02-25 ENCOUNTER — TELEPHONE ENCOUNTER (OUTPATIENT)
Dept: URBAN - METROPOLITAN AREA CLINIC 96 | Facility: CLINIC | Age: 30
End: 2025-02-25

## 2025-02-25 VITALS — WEIGHT: 111 LBS | HEIGHT: 62 IN | BODY MASS INDEX: 20.43 KG/M2

## 2025-02-25 DIAGNOSIS — R19.7 DIARRHEA, UNSPECIFIED TYPE: ICD-10-CM

## 2025-02-25 DIAGNOSIS — R10.84 GENERALIZED ABDOMINAL PAIN: ICD-10-CM

## 2025-02-25 DIAGNOSIS — K50.80 CROHN'S DISEASE OF BOTH SMALL AND LARGE INTESTINE WITHOUT COMPLICATION: ICD-10-CM

## 2025-02-25 PROCEDURE — 99215 OFFICE O/P EST HI 40 MIN: CPT | Performed by: INTERNAL MEDICINE

## 2025-02-25 RX ORDER — INFLIXIMAB 100 MG/10ML
AS DIRECTED INJECTION, POWDER, LYOPHILIZED, FOR SOLUTION INTRAVENOUS
Status: ON HOLD | COMMUNITY

## 2025-02-25 RX ORDER — LEVONORGESTREL/ETHINYL ESTRADIOL AND ETHINYL ESTRADIOL 100-20(84)
KIT ORAL
Qty: 91 TABLET | Status: ACTIVE | COMMUNITY

## 2025-02-25 RX ORDER — OXYCODONE HYDROCHLORIDE AND ACETAMINOPHEN 5; 325 MG/1; MG/1
TAKE 1 TABLET BY MOUTH EVERY 6 HOURS AS NEEDED FOR PAIN TABLET ORAL
Qty: 12 EACH | Refills: 0 | Status: ON HOLD | COMMUNITY

## 2025-02-25 RX ORDER — METHOTREXATE SODIUM 2.5 MG/1
3 TAB TABLET ORAL WEEKLY
Qty: 12 | Refills: 4 | OUTPATIENT
Start: 2025-02-25

## 2025-02-25 RX ORDER — OMEPRAZOLE 40 MG/1
1 CAPSULE 30 MINUTES BEFORE MORNING MEAL CAPSULE, DELAYED RELEASE ORAL ONCE A DAY
Qty: 90 | Refills: 3 | Status: ON HOLD | COMMUNITY

## 2025-02-25 RX ORDER — UPADACITINIB 30 MG/1
AS DIRECTED TABLET, EXTENDED RELEASE ORAL ONCE A DAY
Qty: 84 | Refills: 0 | Status: ON HOLD | COMMUNITY

## 2025-02-25 RX ORDER — NORTRIPTYLINE HYDROCHLORIDE 10 MG/1
2 CAPSULE CAPSULE ORAL ONCE A DAY
Qty: 60 | Refills: 11 | OUTPATIENT
Start: 2025-02-25

## 2025-02-25 RX ORDER — ZOSTER VACCINE RECOMBINANT, ADJUVANTED 50 MCG/0.5
AS DIRECTED KIT INTRAMUSCULAR
Qty: 0.5 ML | Refills: 1 | Status: ON HOLD | COMMUNITY

## 2025-02-25 RX ORDER — FOLIC ACID 1 MG/1
1 TABLET TABLET ORAL ONCE A DAY
Qty: 30 | Refills: 11 | OUTPATIENT
Start: 2025-02-25 | End: 2026-02-20

## 2025-02-25 RX ORDER — DIPHENHYDRAMINE HCL 2 %
1 CAPSULE AT BEDTIME AS NEEDED CREAM (GRAM) TOPICAL ONCE A DAY
Qty: 30 | Refills: 0 | Status: ON HOLD | COMMUNITY
Start: 2025-02-14 | End: 2025-03-16

## 2025-02-25 RX ORDER — PREDNISONE 10 MG/1
TAKE 4 TABLETS (40MG TOTAL) BY MOUTH IN THE MORNING FOR 5 DAYS TABLET ORAL
Qty: 20 EACH | Refills: 0 | Status: ON HOLD | COMMUNITY

## 2025-02-25 RX ORDER — LACTOBACIL 2/BIFIDO 1/S.THERMO 450B CELL
2 CAPSULE PACKET (EA) ORAL ONCE A DAY
Qty: 60 CAPSULE | Refills: 5 | Status: ON HOLD | COMMUNITY

## 2025-02-25 RX ORDER — CERTOLIZUMAB PEGOL 200 MG/ML
1 SYRINGE INJECTION, SOLUTION SUBCUTANEOUS
Qty: 2 | Refills: 11 | OUTPATIENT
Start: 2025-02-25 | End: 2026-01-27

## 2025-02-25 RX ORDER — CERTOLIZUMAB PEGOL 200 MG/ML
1 SYRINGE INJECTION, SOLUTION SUBCUTANEOUS EVERY 2 WEEKS
Qty: 6 | Refills: 0 | OUTPATIENT
Start: 2025-02-25 | End: 2025-03-27

## 2025-02-25 NOTE — HPI-OTHER HISTORIES
Pt Aubrey is a 28 y/o indiv with CD and J-pouch, here for eval of condition. . Pt referred by Rosie . Pt was dxd with UC in 12/2018.  Took MTX initially, but no response.  Eventually started Humira but had reaction with it.  Sxs progressed within 6 months.  Got second opinion with [place], got hospitalized for about 1 month. Then started on Remicade as IP, then proceed with total colectomy, staged.  Then had reversal in 2020.  Still had sxs and in 2021 was dxd with CD.  Then took Stelara, skyrizi, entyvio, rinvoq (stopped 11/2024).  Then retried biosimilar Remicade, but had reaction to it on two occasions.  Has had issues with recurent C diff.  . Today on 2/25/2025, pt reports over 10B per day, has lot of abd pain, joint pain diffuse in nature, worse at time of flare, gets easily dehydrated.  Always has some blood in the stool and mucus.  . CT scan on 10/10/23 revealed colectomy with J pouch, distal small bowel with mild wall thickening to the level of anastomosis suggesting mild uncomplicated enteritis. . 10/2024:  Pouchoscopy 10/4/2024: Moderate inflammation was found in the ileum. Benign polyp in the ileoanal pouch, Erythematous mucosa in the desirae-terminal ileum.  . A. Terminal ileum, biopsy: Chronic active ileitis with ulceration, compatible with history of Crohn's disease. No granulomata or dysplasia identified. B. J-pouch, biopsy: Enteric mucosa with focal active inflammation and ulceration. No granulomata or dysplasia identified. C. J-pouch, polypectomy: Benign inflammatory polyp. No granulomata or dysplasia identified. . Pouchoscopy 3/2021 moderate pouchitis and mild erythema of desirae-TI, bx of TI chronic active ileitis with apthous ulcer c/w crohns and pouch w chronic active ileitis . Pouchoscopy 3/11/2022 mild inflammation of pouch with normal desirae-TI, bx w/ chronic active pouchitis . Pouchoscopy 5/12/2023 showed moderate pouchitis but normal desirae-TI, bx + active inflammation/ulceration

## 2025-03-19 ENCOUNTER — OFFICE VISIT (OUTPATIENT)
Dept: URBAN - METROPOLITAN AREA CLINIC 117 | Facility: CLINIC | Age: 30
End: 2025-03-19

## 2025-03-24 ENCOUNTER — TELEPHONE ENCOUNTER (OUTPATIENT)
Dept: URBAN - METROPOLITAN AREA CLINIC 6 | Facility: CLINIC | Age: 30
End: 2025-03-24

## 2025-04-01 ENCOUNTER — TELEPHONE ENCOUNTER (OUTPATIENT)
Dept: URBAN - METROPOLITAN AREA CLINIC 96 | Facility: CLINIC | Age: 30
End: 2025-04-01

## 2025-04-01 RX ORDER — CERTOLIZUMAB PEGOL 200 MG/ML
1 SYRINGE INJECTION, SOLUTION SUBCUTANEOUS
Qty: 2 | Refills: 11
Start: 2025-02-25 | End: 2026-03-05

## 2025-04-29 ENCOUNTER — OFFICE VISIT (OUTPATIENT)
Dept: URBAN - METROPOLITAN AREA TELEHEALTH 2 | Facility: TELEHEALTH | Age: 30
End: 2025-04-29
Payer: COMMERCIAL

## 2025-04-29 DIAGNOSIS — R19.7 DIARRHEA, UNSPECIFIED TYPE: ICD-10-CM

## 2025-04-29 DIAGNOSIS — R10.84 GENERALIZED ABDOMINAL PAIN: ICD-10-CM

## 2025-04-29 DIAGNOSIS — T88.7XXA DRUG REACTION: ICD-10-CM

## 2025-04-29 DIAGNOSIS — K50.80 CROHN'S DISEASE OF BOTH SMALL AND LARGE INTESTINE WITHOUT COMPLICATION: ICD-10-CM

## 2025-04-29 PROCEDURE — 99214 OFFICE O/P EST MOD 30 MIN: CPT | Performed by: INTERNAL MEDICINE

## 2025-04-29 RX ORDER — METHOTREXATE SODIUM 2.5 MG/1
3 TAB TABLET ORAL WEEKLY
Qty: 12 | Refills: 4 | Status: ACTIVE | COMMUNITY
Start: 2025-02-25

## 2025-04-29 RX ORDER — OXYCODONE HYDROCHLORIDE AND ACETAMINOPHEN 5; 325 MG/1; MG/1
TAKE 1 TABLET BY MOUTH EVERY 6 HOURS AS NEEDED FOR PAIN TABLET ORAL
Qty: 12 EACH | Refills: 0 | Status: ON HOLD | COMMUNITY

## 2025-04-29 RX ORDER — FOLIC ACID 1 MG/1
1 TABLET TABLET ORAL ONCE A DAY
Qty: 30 | Refills: 11 | OUTPATIENT
Start: 2025-04-29

## 2025-04-29 RX ORDER — CERTOLIZUMAB PEGOL 200 MG/ML
1 SYRINGE INJECTION, SOLUTION SUBCUTANEOUS
Qty: 2 | Refills: 11 | OUTPATIENT
Start: 2025-04-29 | End: 2026-03-31

## 2025-04-29 RX ORDER — ZOSTER VACCINE RECOMBINANT, ADJUVANTED 50 MCG/0.5
AS DIRECTED KIT INTRAMUSCULAR
Qty: 0.5 ML | Refills: 1 | Status: ON HOLD | COMMUNITY

## 2025-04-29 RX ORDER — PANTOPRAZOLE SODIUM 40 MG/1
1 TABLET TABLET, DELAYED RELEASE ORAL ONCE A DAY
Qty: 30 TABLET | Refills: 11 | OUTPATIENT
Start: 2025-04-29

## 2025-04-29 RX ORDER — NORTRIPTYLINE HYDROCHLORIDE 10 MG/1
2 CAPSULE CAPSULE ORAL ONCE A DAY
Qty: 60 | Refills: 11 | OUTPATIENT
Start: 2025-04-29

## 2025-04-29 RX ORDER — CERTOLIZUMAB PEGOL 200 MG/ML
1 SYRINGE INJECTION, SOLUTION SUBCUTANEOUS
Qty: 2 | Refills: 11 | Status: ACTIVE | COMMUNITY
Start: 2025-02-25 | End: 2026-03-05

## 2025-04-29 RX ORDER — LACTOBACIL 2/BIFIDO 1/S.THERMO 450B CELL
2 CAPSULE PACKET (EA) ORAL ONCE A DAY
Qty: 60 CAPSULE | Refills: 5 | Status: ON HOLD | COMMUNITY

## 2025-04-29 RX ORDER — UPADACITINIB 30 MG/1
AS DIRECTED TABLET, EXTENDED RELEASE ORAL ONCE A DAY
Qty: 84 | Refills: 0 | Status: ON HOLD | COMMUNITY

## 2025-04-29 RX ORDER — OMEPRAZOLE 40 MG/1
1 CAPSULE 30 MINUTES BEFORE MORNING MEAL CAPSULE, DELAYED RELEASE ORAL ONCE A DAY
Qty: 90 | Refills: 3 | Status: ON HOLD | COMMUNITY

## 2025-04-29 RX ORDER — INFLIXIMAB 100 MG/10ML
AS DIRECTED INJECTION, POWDER, LYOPHILIZED, FOR SOLUTION INTRAVENOUS
Status: ON HOLD | COMMUNITY

## 2025-04-29 RX ORDER — PREDNISONE 10 MG/1
TAKE 4 TABLETS (40MG TOTAL) BY MOUTH IN THE MORNING FOR 5 DAYS TABLET ORAL
Qty: 20 EACH | Refills: 0 | Status: ON HOLD | COMMUNITY

## 2025-04-29 RX ORDER — LEVONORGESTREL/ETHINYL ESTRADIOL AND ETHINYL ESTRADIOL 100-20(84)
KIT ORAL
Qty: 91 TABLET | Status: ACTIVE | COMMUNITY

## 2025-04-29 RX ORDER — FOLIC ACID 1 MG/1
1 TABLET TABLET ORAL ONCE A DAY
Qty: 30 | Refills: 11 | Status: ACTIVE | COMMUNITY
Start: 2025-02-25 | End: 2026-02-20

## 2025-04-29 RX ORDER — METHOTREXATE SODIUM 2.5 MG/1
3 TAB TABLET ORAL WEEKLY
Qty: 12 | Refills: 4 | OUTPATIENT
Start: 2025-04-29

## 2025-04-29 RX ORDER — NORTRIPTYLINE HYDROCHLORIDE 10 MG/1
2 CAPSULE CAPSULE ORAL ONCE A DAY
Qty: 60 | Refills: 11 | Status: ACTIVE | COMMUNITY
Start: 2025-02-25

## 2025-04-29 NOTE — HPI-OTHER HISTORIES
Pt Aubrey is a 31 y/o indiv with CD and J-pouch and MTX (started 3/2025) and Cimzia (started 4/2025) here for eval of condition. . Pt referred by Rosie . Pt was dxd with UC in 12/2018.  Took MTX initially, but no response.  Eventually started Humira but had reaction with it.  Sxs progressed within 6 months.  Got second opinion with [place], got hospitalized for about 1 month. Then started on Remicade as IP, then proceed with total colectomy, staged.  Then had reversal in 2020.  Still had sxs and in 2021 was dxd with CD.  Then took Stelara, skyrizi, entyvio, rinvoq (stopped 11/2024).  Then retried biosimilar Remicade, but had reaction to it on two occasions.  Has had issues with recurent C diff.   Started Cimzia on 4/2025. . Prev on 2/25/2025, pt reports over 10B per day, has lot of abd pain, joint pain diffuse in nature, worse at time of flare, gets easily dehydrated.  Always has some blood in the stool and mucus. . Today on 4/29/2025, pt reports that somewhat improved compared to prior to cimzia/mtx.  Has gained a few pounds.  Having some nausea still, still diarrhea.  Potential related HA with mtx. . CT scan on 10/10/23 revealed colectomy with J pouch, distal small bowel with mild wall thickening to the level of anastomosis suggesting mild uncomplicated enteritis. . 10/2024:  Pouchoscopy 10/4/2024: Moderate inflammation was found in the ileum. Benign polyp in the ileoanal pouch, Erythematous mucosa in the desirae-terminal ileum.  . A. Terminal ileum, biopsy: Chronic active ileitis with ulceration, compatible with history of Crohn's disease. No granulomata or dysplasia identified. B. J-pouch, biopsy: Enteric mucosa with focal active inflammation and ulceration. No granulomata or dysplasia identified. C. J-pouch, polypectomy: Benign inflammatory polyp. No granulomata or dysplasia identified. . Pouchoscopy 3/2021 moderate pouchitis and mild erythema of desirae-TI, bx of TI chronic active ileitis with apthous ulcer c/w crohns and pouch w chronic active ileitis . Pouchoscopy 3/11/2022 mild inflammation of pouch with normal desirae-TI, bx w/ chronic active pouchitis . Pouchoscopy 5/12/2023 showed moderate pouchitis but normal desirae-TI, bx + active inflammation/ulceration

## 2025-04-29 NOTE — PHYSICAL EXAM GASTROINTESTINAL
Abdomen , soft, nontender, nondistended , no guarding or rigidity , no masses palpable , normal bowel sounds , Liver and Spleen , no hepatomegaly present , no hepatosplenomegaly , liver nontender , spleen not palpable 7

## 2025-05-05 ENCOUNTER — LAB OUTSIDE AN ENCOUNTER (OUTPATIENT)
Dept: URBAN - METROPOLITAN AREA TELEHEALTH 2 | Facility: TELEHEALTH | Age: 30
End: 2025-05-05

## 2025-05-19 ENCOUNTER — LAB OUTSIDE AN ENCOUNTER (OUTPATIENT)
Dept: URBAN - METROPOLITAN AREA CLINIC 92 | Facility: CLINIC | Age: 30
End: 2025-05-19

## 2025-05-19 ENCOUNTER — TELEPHONE ENCOUNTER (OUTPATIENT)
Dept: URBAN - METROPOLITAN AREA CLINIC 96 | Facility: CLINIC | Age: 30
End: 2025-05-19

## 2025-05-19 ENCOUNTER — TELEPHONE ENCOUNTER (OUTPATIENT)
Dept: URBAN - METROPOLITAN AREA CLINIC 124 | Facility: CLINIC | Age: 30
End: 2025-05-19

## 2025-05-20 LAB
A/G RATIO: 1.2
ABSOLUTE BASOPHILS: 70
ABSOLUTE EOSINOPHILS: 322
ABSOLUTE LYMPHOCYTES: 2497
ABSOLUTE MONOCYTES: 696
ABSOLUTE NEUTROPHILS: 5116
ALBUMIN: 4.5
ALKALINE PHOSPHATASE: 73
ALT (SGPT): 19
AST (SGOT): 23
BASOPHILS: 0.8
BILIRUBIN, TOTAL: 0.6
BUN/CREATININE RATIO: (no result)
BUN: 10
C-REACTIVE PROTEIN, QUANT: 13.8
CALCIUM: 9.7
CARBON DIOXIDE, TOTAL: 22
CHLORIDE: 102
CREATININE: 0.93
EGFR: 85
EOSINOPHILS: 3.7
GLOBULIN, TOTAL: 3.8
GLUCOSE: 89
HEMATOCRIT: 45.2
HEMOGLOBIN: 14.3
LYMPHOCYTES: 28.7
MCH: 31.4
MCHC: 31.6
MCV: 99.1
MONOCYTES: 8
MPV: 11
NEUTROPHILS: 58.8
PLATELET COUNT: 379
POTASSIUM: 4.2
PROTEIN, TOTAL: 8.3
RDW: 11.8
RED BLOOD CELL COUNT: 4.56
SED RATE BY MODIFIED: 45
SODIUM: 137
WHITE BLOOD CELL COUNT: 8.7

## 2025-05-22 ENCOUNTER — LAB OUTSIDE AN ENCOUNTER (OUTPATIENT)
Dept: URBAN - METROPOLITAN AREA CLINIC 92 | Facility: CLINIC | Age: 30
End: 2025-05-22

## 2025-05-29 LAB — CALPROTECTIN, FECAL: 68

## 2025-05-30 ENCOUNTER — OFFICE VISIT (OUTPATIENT)
Dept: URBAN - METROPOLITAN AREA TELEHEALTH 2 | Facility: TELEHEALTH | Age: 30
End: 2025-05-30
Payer: COMMERCIAL

## 2025-05-30 DIAGNOSIS — K50.80 CROHN'S DISEASE OF BOTH SMALL AND LARGE INTESTINE WITHOUT COMPLICATION: ICD-10-CM

## 2025-05-30 DIAGNOSIS — R19.7 DIARRHEA, UNSPECIFIED TYPE: ICD-10-CM

## 2025-05-30 DIAGNOSIS — R10.84 GENERALIZED ABDOMINAL PAIN: ICD-10-CM

## 2025-05-30 DIAGNOSIS — T88.7XXA DRUG REACTION: ICD-10-CM

## 2025-05-30 PROCEDURE — 99213 OFFICE O/P EST LOW 20 MIN: CPT | Performed by: PHYSICIAN ASSISTANT

## 2025-05-30 RX ORDER — CERTOLIZUMAB PEGOL 200 MG/ML
1 SYRINGE INJECTION, SOLUTION SUBCUTANEOUS
Qty: 2 | Refills: 11 | Status: ACTIVE | COMMUNITY
Start: 2025-04-29 | End: 2026-03-31

## 2025-05-30 RX ORDER — INFLIXIMAB 100 MG/10ML
AS DIRECTED INJECTION, POWDER, LYOPHILIZED, FOR SOLUTION INTRAVENOUS
Status: ON HOLD | COMMUNITY

## 2025-05-30 RX ORDER — LEVONORGESTREL/ETHINYL ESTRADIOL AND ETHINYL ESTRADIOL 100-20(84)
KIT ORAL
Qty: 91 TABLET | Status: ACTIVE | COMMUNITY

## 2025-05-30 RX ORDER — PANTOPRAZOLE SODIUM 40 MG/1
1 TABLET TABLET, DELAYED RELEASE ORAL ONCE A DAY
Qty: 30 TABLET | Refills: 11 | Status: ACTIVE | COMMUNITY
Start: 2025-04-29

## 2025-05-30 RX ORDER — NORTRIPTYLINE HYDROCHLORIDE 10 MG/1
2 CAPSULE CAPSULE ORAL ONCE A DAY
Qty: 60 | Refills: 11 | OUTPATIENT
Start: 2025-05-27

## 2025-05-30 RX ORDER — OXYCODONE HYDROCHLORIDE AND ACETAMINOPHEN 5; 325 MG/1; MG/1
TAKE 1 TABLET BY MOUTH EVERY 6 HOURS AS NEEDED FOR PAIN TABLET ORAL
Qty: 12 EACH | Refills: 0 | Status: ON HOLD | COMMUNITY

## 2025-05-30 RX ORDER — NORTRIPTYLINE HYDROCHLORIDE 10 MG/1
2 CAPSULE CAPSULE ORAL ONCE A DAY
Qty: 60 | Refills: 11 | Status: ACTIVE | COMMUNITY
Start: 2025-04-29

## 2025-05-30 RX ORDER — ZOSTER VACCINE RECOMBINANT, ADJUVANTED 50 MCG/0.5
AS DIRECTED KIT INTRAMUSCULAR
Qty: 0.5 ML | Refills: 1 | Status: ON HOLD | COMMUNITY

## 2025-05-30 RX ORDER — METHOTREXATE SODIUM 2.5 MG/1
3 TAB TABLET ORAL WEEKLY
Qty: 12 | Refills: 4 | OUTPATIENT
Start: 2025-05-27

## 2025-05-30 RX ORDER — UPADACITINIB 30 MG/1
AS DIRECTED TABLET, EXTENDED RELEASE ORAL ONCE A DAY
Qty: 84 | Refills: 0 | Status: ON HOLD | COMMUNITY

## 2025-05-30 RX ORDER — LACTOBACIL 2/BIFIDO 1/S.THERMO 450B CELL
2 CAPSULE PACKET (EA) ORAL ONCE A DAY
Qty: 60 CAPSULE | Refills: 5 | Status: ON HOLD | COMMUNITY

## 2025-05-30 RX ORDER — CERTOLIZUMAB PEGOL 200 MG/ML
1 SYRINGE INJECTION, SOLUTION SUBCUTANEOUS
Qty: 2 | Refills: 11 | OUTPATIENT
Start: 2025-05-27 | End: 2026-04-28

## 2025-05-30 RX ORDER — METHOTREXATE SODIUM 2.5 MG/1
3 TAB TABLET ORAL WEEKLY
Qty: 12 | Refills: 4 | Status: ACTIVE | COMMUNITY
Start: 2025-04-29

## 2025-05-30 RX ORDER — PANTOPRAZOLE SODIUM 40 MG/1
1 TABLET TABLET, DELAYED RELEASE ORAL ONCE A DAY
Qty: 30 TABLET | Refills: 11 | OUTPATIENT
Start: 2025-05-27

## 2025-05-30 RX ORDER — FOLIC ACID 1 MG/1
1 TABLET TABLET ORAL ONCE A DAY
Qty: 30 | Refills: 11 | OUTPATIENT
Start: 2025-05-27

## 2025-05-30 RX ORDER — PREDNISONE 10 MG/1
TAKE 4 TABLETS (40MG TOTAL) BY MOUTH IN THE MORNING FOR 5 DAYS TABLET ORAL
Qty: 20 EACH | Refills: 0 | Status: ON HOLD | COMMUNITY

## 2025-05-30 RX ORDER — OMEPRAZOLE 40 MG/1
1 CAPSULE 30 MINUTES BEFORE MORNING MEAL CAPSULE, DELAYED RELEASE ORAL ONCE A DAY
Qty: 90 | Refills: 3 | Status: ON HOLD | COMMUNITY

## 2025-05-30 RX ORDER — FOLIC ACID 1 MG/1
1 TABLET TABLET ORAL ONCE A DAY
Qty: 30 | Refills: 11 | Status: ACTIVE | COMMUNITY
Start: 2025-04-29

## 2025-05-30 NOTE — HPI-OTHER HISTORIES
30 y/oF with CD and J-pouch presents for f/u.  Pt was dxd with UC in 12/2018.  Took MTX initially, but no response.  Eventually started Humira but had reaction with it.  Got second opinion with [facility], started on Remicade as IP, then proceed with total colectomy, staged.  Then had reversal in 2020.  Still had sxs and in 2021 was dxd with CD.  Then took Stelara, skyrizi, entyvio, rinvoq w/o response/remission.  Then retried biosimilar Remicade, but had reaction to it on two occasions.  Has had issues with recurent C diff as well and multiple hospitalizations.  Now on MTX (started 3/2025) and Cimzia (started 4/2025). She had some nausea and fatigue she thought was from MTX but stopped it and sx persisted and restarted it last week and fatigue improving but still with nausea, improves some with eating for 10-15m after eating but then recurs but no other triggers and fairly constant. No regurg, indigestion and on PPI.  Pain has improved but bowels are still 10+/day. Pain is better but not gone.   CT scan on 10/10/23 revealed colectomy with J pouch, distal small bowel with mild wall thickening to the level of anastomosis suggesting mild uncomplicated enteritis. 10/2024: Pouchoscopy 10/4/2024: Moderate inflammation was found in the ileum. Benign polyp in the ileoanal pouch, Erythematous mucosa in the desirae-terminal ileum.  Terminal ileum, biopsy: Chronic active ileitis with ulceration, compatible with history of Crohn's disease. J-pouch, biopsy: Enteric mucosa with focal active inflammation and ulceration. J-pouch, polypectomy: Benign inflammatory polyp.  Pouchoscopy 3/2021 moderate pouchitis and mild erythema of desirae-TI, bx of TI chronic active ileitis with apthous ulcer c/w crohns and pouch w chronic active ileitis Pouchoscopy 3/11/2022 mild inflammation of pouch with normal desirae-TI, bx w/ chronic active pouchitis Pouchoscopy 5/12/2023 showed moderate pouchitis but normal desirae-TI, bx + active inflammation/ulceration

## 2025-08-26 ENCOUNTER — OFFICE VISIT (OUTPATIENT)
Dept: URBAN - METROPOLITAN AREA TELEHEALTH 2 | Facility: TELEHEALTH | Age: 30
End: 2025-08-26
Payer: COMMERCIAL

## 2025-08-26 DIAGNOSIS — T88.7XXA DRUG REACTION: ICD-10-CM

## 2025-08-26 DIAGNOSIS — R19.7 DIARRHEA, UNSPECIFIED TYPE: ICD-10-CM

## 2025-08-26 DIAGNOSIS — K50.80 CROHN'S DISEASE OF BOTH SMALL AND LARGE INTESTINE WITHOUT COMPLICATION: ICD-10-CM

## 2025-08-26 DIAGNOSIS — R10.84 GENERALIZED ABDOMINAL PAIN: ICD-10-CM

## 2025-08-26 PROCEDURE — 99214 OFFICE O/P EST MOD 30 MIN: CPT | Performed by: INTERNAL MEDICINE

## 2025-08-26 RX ORDER — INFLIXIMAB 100 MG/10ML
AS DIRECTED INJECTION, POWDER, LYOPHILIZED, FOR SOLUTION INTRAVENOUS
Status: ON HOLD | COMMUNITY

## 2025-08-26 RX ORDER — CERTOLIZUMAB PEGOL 200 MG/ML
1 SYRINGE INJECTION, SOLUTION SUBCUTANEOUS
Qty: 2 | Refills: 11 | Status: ACTIVE | COMMUNITY
Start: 2025-05-27 | End: 2026-04-28

## 2025-08-26 RX ORDER — PREDNISONE 10 MG/1
TAKE 4 TABLETS (40MG TOTAL) BY MOUTH IN THE MORNING FOR 5 DAYS TABLET ORAL
Qty: 20 EACH | Refills: 0 | Status: ON HOLD | COMMUNITY

## 2025-08-26 RX ORDER — FOLIC ACID 1 MG/1
1 TABLET TABLET ORAL ONCE A DAY
Qty: 30 | Refills: 11 | Status: ACTIVE | COMMUNITY
Start: 2025-05-27

## 2025-08-26 RX ORDER — PANTOPRAZOLE SODIUM 40 MG/1
1 TABLET TABLET, DELAYED RELEASE ORAL ONCE A DAY
Qty: 30 TABLET | Refills: 11 | OUTPATIENT
Start: 2025-08-26

## 2025-08-26 RX ORDER — LACTOBACIL 2/BIFIDO 1/S.THERMO 450B CELL
2 CAPSULE PACKET (EA) ORAL ONCE A DAY
Qty: 60 CAPSULE | Refills: 5 | Status: ON HOLD | COMMUNITY

## 2025-08-26 RX ORDER — FOLIC ACID 1 MG/1
1 TABLET TABLET ORAL ONCE A DAY
Qty: 30 | Refills: 11 | OUTPATIENT
Start: 2025-08-26

## 2025-08-26 RX ORDER — LEVONORGESTREL/ETHINYL ESTRADIOL AND ETHINYL ESTRADIOL 100-20(84)
KIT ORAL
Qty: 91 TABLET | Status: ACTIVE | COMMUNITY

## 2025-08-26 RX ORDER — NORTRIPTYLINE HYDROCHLORIDE 10 MG/1
2 CAPSULE CAPSULE ORAL ONCE A DAY
Qty: 60 | Refills: 11 | Status: ACTIVE | COMMUNITY
Start: 2025-05-27

## 2025-08-26 RX ORDER — NORTRIPTYLINE HYDROCHLORIDE 10 MG/1
2 CAPSULE CAPSULE ORAL ONCE A DAY
Qty: 60 | Refills: 11 | OUTPATIENT
Start: 2025-08-26

## 2025-08-26 RX ORDER — OMEPRAZOLE 40 MG/1
1 CAPSULE 30 MINUTES BEFORE MORNING MEAL CAPSULE, DELAYED RELEASE ORAL ONCE A DAY
Qty: 90 | Refills: 3 | Status: ON HOLD | COMMUNITY

## 2025-08-26 RX ORDER — PANTOPRAZOLE SODIUM 40 MG/1
1 TABLET TABLET, DELAYED RELEASE ORAL ONCE A DAY
Qty: 30 TABLET | Refills: 11 | Status: ACTIVE | COMMUNITY
Start: 2025-05-27

## 2025-08-26 RX ORDER — OXYCODONE HYDROCHLORIDE AND ACETAMINOPHEN 5; 325 MG/1; MG/1
TAKE 1 TABLET BY MOUTH EVERY 6 HOURS AS NEEDED FOR PAIN TABLET ORAL
Qty: 12 EACH | Refills: 0 | Status: ON HOLD | COMMUNITY

## 2025-08-26 RX ORDER — METHOTREXATE SODIUM 2.5 MG/1
3 TAB TABLET ORAL WEEKLY
Qty: 12 | Refills: 4 | Status: ACTIVE | COMMUNITY
Start: 2025-05-27

## 2025-08-26 RX ORDER — ZOSTER VACCINE RECOMBINANT, ADJUVANTED 50 MCG/0.5
AS DIRECTED KIT INTRAMUSCULAR
Qty: 0.5 ML | Refills: 1 | Status: ON HOLD | COMMUNITY

## 2025-08-26 RX ORDER — UPADACITINIB 30 MG/1
AS DIRECTED TABLET, EXTENDED RELEASE ORAL ONCE A DAY
Qty: 84 | Refills: 0 | Status: ON HOLD | COMMUNITY

## 2025-08-26 RX ORDER — CERTOLIZUMAB PEGOL 200 MG/ML
1 SYRINGE INJECTION, SOLUTION SUBCUTANEOUS
Qty: 2 | Refills: 11 | OUTPATIENT
Start: 2025-08-26 | End: 2026-07-28

## 2025-08-26 RX ORDER — METHOTREXATE 25 MG/ML
12.5MG 0.5ML INJECTION, SOLUTION INTRA-ARTERIAL; INTRAMUSCULAR; INTRAVENOUS
Qty: 1 | Refills: 6 | OUTPATIENT
Start: 2025-08-26